# Patient Record
Sex: FEMALE | Race: WHITE | NOT HISPANIC OR LATINO | URBAN - METROPOLITAN AREA
[De-identification: names, ages, dates, MRNs, and addresses within clinical notes are randomized per-mention and may not be internally consistent; named-entity substitution may affect disease eponyms.]

---

## 2015-06-12 RX ORDER — ALPRAZOLAM 0.25 MG
1 TABLET ORAL
Qty: 0 | Refills: 0 | COMMUNITY
Start: 2015-06-12

## 2017-02-11 ENCOUNTER — INPATIENT (INPATIENT)
Facility: HOSPITAL | Age: 72
LOS: 3 days | Discharge: HOME HEALTH | DRG: 312 | End: 2017-02-15
Attending: FAMILY MEDICINE | Admitting: FAMILY MEDICINE
Payer: MEDICARE

## 2017-02-11 VITALS
TEMPERATURE: 99 F | HEART RATE: 111 BPM | OXYGEN SATURATION: 92 % | SYSTOLIC BLOOD PRESSURE: 129 MMHG | WEIGHT: 229.94 LBS | DIASTOLIC BLOOD PRESSURE: 93 MMHG | HEIGHT: 63 IN | RESPIRATION RATE: 18 BRPM

## 2017-02-11 DIAGNOSIS — Z95.1 PRESENCE OF AORTOCORONARY BYPASS GRAFT: Chronic | ICD-10-CM

## 2017-02-11 LAB
ALBUMIN SERPL ELPH-MCNC: 3.9 G/DL — SIGNIFICANT CHANGE UP (ref 3.3–5.2)
ALP SERPL-CCNC: 127 U/L — HIGH (ref 40–120)
ALT FLD-CCNC: 15 U/L — SIGNIFICANT CHANGE UP
ANION GAP SERPL CALC-SCNC: 14 MMOL/L — SIGNIFICANT CHANGE UP (ref 5–17)
APTT BLD: 31.8 SEC — SIGNIFICANT CHANGE UP (ref 27.5–37.4)
AST SERPL-CCNC: 23 U/L — SIGNIFICANT CHANGE UP
BASOPHILS # BLD AUTO: 0 K/UL — SIGNIFICANT CHANGE UP (ref 0–0.2)
BASOPHILS NFR BLD AUTO: 0.4 % — SIGNIFICANT CHANGE UP (ref 0–2)
BILIRUB SERPL-MCNC: 0.7 MG/DL — SIGNIFICANT CHANGE UP (ref 0.4–2)
BUN SERPL-MCNC: 15 MG/DL — SIGNIFICANT CHANGE UP (ref 8–20)
CALCIUM SERPL-MCNC: 9.7 MG/DL — SIGNIFICANT CHANGE UP (ref 8.6–10.2)
CHLORIDE SERPL-SCNC: 101 MMOL/L — SIGNIFICANT CHANGE UP (ref 98–107)
CK SERPL-CCNC: 112 U/L — SIGNIFICANT CHANGE UP (ref 25–170)
CO2 SERPL-SCNC: 24 MMOL/L — SIGNIFICANT CHANGE UP (ref 22–29)
CREAT SERPL-MCNC: 0.52 MG/DL — SIGNIFICANT CHANGE UP (ref 0.5–1.3)
EOSINOPHIL # BLD AUTO: 0 K/UL — SIGNIFICANT CHANGE UP (ref 0–0.5)
EOSINOPHIL NFR BLD AUTO: 0.4 % — SIGNIFICANT CHANGE UP (ref 0–6)
GLUCOSE SERPL-MCNC: 264 MG/DL — HIGH (ref 70–115)
HCT VFR BLD CALC: 35 % — LOW (ref 37–47)
HGB BLD-MCNC: 11 G/DL — LOW (ref 12–16)
INR BLD: 1.11 RATIO — SIGNIFICANT CHANGE UP (ref 0.88–1.16)
LYMPHOCYTES # BLD AUTO: 2.3 K/UL — SIGNIFICANT CHANGE UP (ref 1–4.8)
LYMPHOCYTES # BLD AUTO: 21.9 % — SIGNIFICANT CHANGE UP (ref 20–55)
MCHC RBC-ENTMCNC: 22.9 PG — LOW (ref 27–31)
MCHC RBC-ENTMCNC: 31.4 G/DL — LOW (ref 32–36)
MCV RBC AUTO: 72.9 FL — LOW (ref 81–99)
MONOCYTES # BLD AUTO: 0.7 K/UL — SIGNIFICANT CHANGE UP (ref 0–0.8)
MONOCYTES NFR BLD AUTO: 6.5 % — SIGNIFICANT CHANGE UP (ref 3–10)
NEUTROPHILS # BLD AUTO: 7.4 K/UL — SIGNIFICANT CHANGE UP (ref 1.8–8)
NEUTROPHILS NFR BLD AUTO: 70.5 % — SIGNIFICANT CHANGE UP (ref 37–73)
PLATELET # BLD AUTO: 204 K/UL — SIGNIFICANT CHANGE UP (ref 150–400)
POTASSIUM SERPL-MCNC: 3.7 MMOL/L — SIGNIFICANT CHANGE UP (ref 3.5–5.3)
POTASSIUM SERPL-SCNC: 3.7 MMOL/L — SIGNIFICANT CHANGE UP (ref 3.5–5.3)
PROT SERPL-MCNC: 6.5 G/DL — LOW (ref 6.6–8.7)
PROTHROM AB SERPL-ACNC: 12.2 SEC — SIGNIFICANT CHANGE UP (ref 10–13.1)
RBC # BLD: 4.8 M/UL — SIGNIFICANT CHANGE UP (ref 4.4–5.2)
RBC # FLD: 15.9 % — HIGH (ref 11–15.6)
SODIUM SERPL-SCNC: 139 MMOL/L — SIGNIFICANT CHANGE UP (ref 135–145)
TROPONIN T SERPL-MCNC: 0.16 NG/ML — CRITICAL HIGH (ref 0–0.06)
WBC # BLD: 10.48 K/UL — SIGNIFICANT CHANGE UP (ref 4.8–10.8)
WBC # FLD AUTO: 10.48 K/UL — SIGNIFICANT CHANGE UP (ref 4.8–10.8)

## 2017-02-11 PROCEDURE — 99285 EMERGENCY DEPT VISIT HI MDM: CPT

## 2017-02-11 PROCEDURE — 93010 ELECTROCARDIOGRAM REPORT: CPT

## 2017-02-11 RX ORDER — ASPIRIN/CALCIUM CARB/MAGNESIUM 324 MG
325 TABLET ORAL ONCE
Qty: 0 | Refills: 0 | Status: COMPLETED | OUTPATIENT
Start: 2017-02-11 | End: 2017-02-11

## 2017-02-11 RX ORDER — ALPRAZOLAM 0.25 MG
1 TABLET ORAL ONCE
Qty: 0 | Refills: 0 | Status: DISCONTINUED | OUTPATIENT
Start: 2017-02-11 | End: 2017-02-11

## 2017-02-11 RX ORDER — SODIUM CHLORIDE 9 MG/ML
3 INJECTION INTRAMUSCULAR; INTRAVENOUS; SUBCUTANEOUS ONCE
Qty: 0 | Refills: 0 | Status: COMPLETED | OUTPATIENT
Start: 2017-02-11 | End: 2017-02-11

## 2017-02-11 RX ADMIN — Medication 1 MILLIGRAM(S): at 22:16

## 2017-02-11 RX ADMIN — SODIUM CHLORIDE 3 MILLILITER(S): 9 INJECTION INTRAMUSCULAR; INTRAVENOUS; SUBCUTANEOUS at 20:45

## 2017-02-11 RX ADMIN — Medication 325 MILLIGRAM(S): at 22:16

## 2017-02-11 NOTE — ED PROVIDER NOTE - OBJECTIVE STATEMENT
70 y/o F with h/o stents, CABG, porcine valve replacement BIB EMS with daughter to the ED c/o syncopal episode with sudden onset ~5 hours ago. Pt notes dizziness before the episode. As per daughter, pt was trying to get into bed and lost consciousness suddenly falling onto her face witnessed by her son-in-law. Daughter notes the episode lasted 10-15 minutes and that her right arm was shaking during the episode. Daughter also notes pt was diaphoretic with widened pupils and slurred speech, and that she bit her tongue after she regained consciousness. Pt denies CP or SOB before the episode. Pt notes that she currently feels slightly SOB. Non-smoker, no EtOH use. No further complaints at this time. Cardiologist is Dr. Winters. Neurologist is Dr. Carvajal at [facility]. 70 y/o F with h/o stents, CABG, porcine valve replacement, Parkinson's, and DM BIB EMS with daughter to the ED c/o syncopal episode with sudden onset ~5 hours ago. Pt notes dizziness before the episode. As per daughter, pt was trying to get into bed and lost consciousness suddenly falling onto her face witnessed by her son-in-law. Daughter notes the episode lasted 10-15 minutes and that her right arm was shaking during the episode. Daughter also notes pt was diaphoretic with widened pupils and slurred speech, and that she bit her tongue after she regained consciousness. Pt denies CP or SOB before the episode. Pt notes that she currently feels slightly SOB. Non-smoker, no EtOH use. No further complaints at this time. Cardiologist is Dr. Winters. Neurologist is Dr. Carvajal at [facility]. 70 y/o F with h/o stents, CABG, porcine valve replacement, Parkinson's, and DM BIB EMS with daughter to the ED c/o syncopal episode with sudden onset ~5 hours ago. Pt notes dizziness before the episode. As per daughter, pt was trying to get into bed and lost consciousness suddenly falling onto her face witnessed by her son-in-law. Daughter notes the episode lasted 10-15 minutes and that her right arm was shaking during the episode. Daughter also notes pt was diaphoretic with widened pupils and slurred speech, and that she bit her tongue after she regained consciousness. Pt denies CP or SOB before the episode. Pt notes that she currently feels slightly SOB. Non-smoker, no EtOH use. No further complaints at this time. Cardiologist is Dr. Winters. Neurologist is Dr. Carvajal at [facility].

## 2017-02-11 NOTE — ED ADULT NURSE NOTE - OBJECTIVE STATEMENT
Patient alert and oriented x3 with noted to fall by family. Unaware of fall, as per family " about 15v minutes with labored breathing.

## 2017-02-11 NOTE — ED ADULT TRIAGE NOTE - CHIEF COMPLAINT QUOTE
BIBA, patient is awake and oriented times 3, s/p and episode of unresponsiveness today while at home today witnessed by family , fs [phone]

## 2017-02-11 NOTE — ED PROVIDER NOTE - MEDICAL DECISION MAKING DETAILS
Will check labs including cardiac enzymes. Pt will get CT head, and will require admission for further workup/evaluation for syncope/seizure. Will require evaluation by neurology.

## 2017-02-11 NOTE — ED PROVIDER NOTE - NEUROLOGICAL, MLM
Alert and oriented, no focal deficits, no motor or sensory deficits. CN II-XII intact. Muscle strength 5/5. Alert and oriented, no focal deficits, no motor or sensory deficits. CN II-XII intact. Muscle strength 5/5 b/l UE/LE's

## 2017-02-11 NOTE — ED PROVIDER NOTE - GASTROINTESTINAL, MLM
Abdomen soft, non-tender, no guarding. Bowel sounds present. Abdomen soft, non-tender, no guarding. Bowel sounds present, obese

## 2017-02-11 NOTE — ED PROVIDER NOTE - PROGRESS NOTE DETAILS
Pt with elev Troponin to 0.16 with no acute EKG changes.  Case d/w Hospitalist/Dr. Sahu for PMD/Dr. Smith and will admit to Tele

## 2017-02-11 NOTE — ED PROVIDER NOTE - ENMT, MLM
Airway patent, Nasal mucosa clear. Mouth with normal mucosa. Throat has no vesicles, no oropharyngeal exudates and uvula is midline. Neck supple, FROM. Light abrasion to left lateral tongue.

## 2017-02-11 NOTE — ED PROVIDER NOTE - NS ED MD SCRIBE ATTENDING SCRIBE SECTIONS
REVIEW OF SYSTEMS/PAST MEDICAL/SURGICAL/SOCIAL HISTORY/PHYSICAL EXAM/DISPOSITION/VITAL SIGNS( Pullset)/HISTORY OF PRESENT ILLNESS

## 2017-02-11 NOTE — ED PROVIDER NOTE - PMH
Anxiety    Aortic stenosis    Diabetes    Hypertension    Hypothyroid    Parkinson disease    Retinal defect, left

## 2017-02-11 NOTE — ED ADULT NURSE REASSESSMENT NOTE - NS ED NURSE REASSESS COMMENT FT1
Patient alert and oriented x3, EKG obtained and results given to MD. No noted concerns at this time. Will continue to monitor for safety and discomfort.

## 2017-02-11 NOTE — ED ADULT NURSE NOTE - PSH
S/P CABG x 2    S/P cholecystectomy    S/P hysterectomy    S/P orthopedic surgery, follow-up exam  back  S/P shoulder surgery  Bilateral Rotator cuff

## 2017-02-11 NOTE — ED PROVIDER NOTE - CARDIAC, MLM
Normal rate, regular rhythm.  Heart sounds S1, S2.  No murmurs, rubs or gallops. GR 2/6 style murmur. Normal rate, regular rhythm.  Heart sounds S1, S2. GR 2/6 style murmur. Normal rate, regular rhythm.  Heart sounds S1, S2. GR 2/6 JAVED

## 2017-02-12 DIAGNOSIS — G20 PARKINSON'S DISEASE: ICD-10-CM

## 2017-02-12 DIAGNOSIS — R79.89 OTHER SPECIFIED ABNORMAL FINDINGS OF BLOOD CHEMISTRY: ICD-10-CM

## 2017-02-12 DIAGNOSIS — R55 SYNCOPE AND COLLAPSE: ICD-10-CM

## 2017-02-12 DIAGNOSIS — E11.9 TYPE 2 DIABETES MELLITUS WITHOUT COMPLICATIONS: ICD-10-CM

## 2017-02-12 DIAGNOSIS — I24.9 ACUTE ISCHEMIC HEART DISEASE, UNSPECIFIED: ICD-10-CM

## 2017-02-12 DIAGNOSIS — I10 ESSENTIAL (PRIMARY) HYPERTENSION: ICD-10-CM

## 2017-02-12 LAB
TROPONIN T SERPL-MCNC: 0.05 NG/ML — SIGNIFICANT CHANGE UP (ref 0–0.06)
TROPONIN T SERPL-MCNC: 0.08 NG/ML — HIGH (ref 0–0.06)

## 2017-02-12 PROCEDURE — 99223 1ST HOSP IP/OBS HIGH 75: CPT

## 2017-02-12 PROCEDURE — 70450 CT HEAD/BRAIN W/O DYE: CPT | Mod: 26

## 2017-02-12 PROCEDURE — 93880 EXTRACRANIAL BILAT STUDY: CPT | Mod: 26

## 2017-02-12 PROCEDURE — 70551 MRI BRAIN STEM W/O DYE: CPT | Mod: 26

## 2017-02-12 RX ORDER — SERTRALINE 25 MG/1
100 TABLET, FILM COATED ORAL DAILY
Qty: 0 | Refills: 0 | Status: DISCONTINUED | OUTPATIENT
Start: 2017-02-12 | End: 2017-02-15

## 2017-02-12 RX ORDER — LOSARTAN POTASSIUM 100 MG/1
25 TABLET, FILM COATED ORAL DAILY
Qty: 0 | Refills: 0 | Status: DISCONTINUED | OUTPATIENT
Start: 2017-02-12 | End: 2017-02-15

## 2017-02-12 RX ORDER — CARBIDOPA AND LEVODOPA 25; 100 MG/1; MG/1
1 TABLET ORAL
Qty: 0 | Refills: 0 | Status: DISCONTINUED | OUTPATIENT
Start: 2017-02-12 | End: 2017-02-15

## 2017-02-12 RX ORDER — ENOXAPARIN SODIUM 100 MG/ML
40 INJECTION SUBCUTANEOUS DAILY
Qty: 0 | Refills: 0 | Status: DISCONTINUED | OUTPATIENT
Start: 2017-02-12 | End: 2017-02-15

## 2017-02-12 RX ORDER — CLOPIDOGREL BISULFATE 75 MG/1
75 TABLET, FILM COATED ORAL DAILY
Qty: 0 | Refills: 0 | Status: DISCONTINUED | OUTPATIENT
Start: 2017-02-12 | End: 2017-02-15

## 2017-02-12 RX ORDER — METOPROLOL TARTRATE 50 MG
50 TABLET ORAL DAILY
Qty: 0 | Refills: 0 | Status: DISCONTINUED | OUTPATIENT
Start: 2017-02-12 | End: 2017-02-15

## 2017-02-12 RX ORDER — GABAPENTIN 400 MG/1
200 CAPSULE ORAL DAILY
Qty: 0 | Refills: 0 | Status: DISCONTINUED | OUTPATIENT
Start: 2017-02-12 | End: 2017-02-15

## 2017-02-12 RX ADMIN — CARBIDOPA AND LEVODOPA 1 TABLET(S): 25; 100 TABLET ORAL at 23:36

## 2017-02-12 RX ADMIN — CARBIDOPA AND LEVODOPA 1 TABLET(S): 25; 100 TABLET ORAL at 12:12

## 2017-02-12 RX ADMIN — CARBIDOPA AND LEVODOPA 1 TABLET(S): 25; 100 TABLET ORAL at 17:04

## 2017-02-12 RX ADMIN — SERTRALINE 100 MILLIGRAM(S): 25 TABLET, FILM COATED ORAL at 12:12

## 2017-02-12 RX ADMIN — CLOPIDOGREL BISULFATE 75 MILLIGRAM(S): 75 TABLET, FILM COATED ORAL at 12:12

## 2017-02-12 RX ADMIN — LOSARTAN POTASSIUM 25 MILLIGRAM(S): 100 TABLET, FILM COATED ORAL at 05:51

## 2017-02-12 RX ADMIN — GABAPENTIN 200 MILLIGRAM(S): 400 CAPSULE ORAL at 12:12

## 2017-02-12 RX ADMIN — Medication 50 MILLIGRAM(S): at 05:52

## 2017-02-12 RX ADMIN — CARBIDOPA AND LEVODOPA 1 TABLET(S): 25; 100 TABLET ORAL at 05:51

## 2017-02-12 RX ADMIN — Medication 1 MILLIGRAM(S): at 15:07

## 2017-02-12 RX ADMIN — ENOXAPARIN SODIUM 40 MILLIGRAM(S): 100 INJECTION SUBCUTANEOUS at 12:12

## 2017-02-12 NOTE — CONSULT NOTE ADULT - SUBJECTIVE AND OBJECTIVE BOX
[facility], [facility]                                                    [address], [address], [place] [address]                                                         PHONE: [phone]    FAX: [phone] [address], [address], [place] [address]                                                 PHONE: [phone]    FAX: [phone]  *******************************************************************************    Reason for Consult: Elevated troponin    HPI:  ANGELA ACHARYA is a 71y Female with h/o CAD/stents, TAVR, DM, HTN, HL, obesity, COPD a/w syncope.  Patient was going to bed and developed visual changes, dizziness & had LOC x20 minutes and awoke in the ambulance.  No CP, palpitations, orthopnea, PND, LE edema.  + Chronic intermittent SOB stable.    PAST MEDICAL & SURGICAL HISTORY:  Retinal defect, left  Aortic stenosis  Parkinson disease  Anxiety  Diabetes  Hypertension  Hypothyroid  S/P shoulder surgery: Bilateral Rotator cuff  S/P orthopedic surgery, follow-up exam: back  S/P cholecystectomy  S/P hysterectomy  S/P CABG x 2      penicillin (Angioedema)  Ultram (Rash)      MEDICATIONS  (STANDING):  enoxaparin Injectable 40milliGRAM(s) SubCutaneous daily  clopidogrel Tablet 75milliGRAM(s) Oral daily  carbidopa/levodopa  25/100 1Tablet(s) Oral four times a day  sertraline 100milliGRAM(s) Oral daily  gabapentin 200milliGRAM(s) Oral daily  metoprolol succinate ER 50milliGRAM(s) Oral daily  losartan 25milliGRAM(s) Oral daily    MEDICATIONS  (PRN):  LORazepam   Injectable 1milliGRAM(s) IV Push once PRN for mri      Social History: no active tobacco / EtOH / IVDA    Family History: Family history of cirrhosis of liver  Family history of cirrhosis of liver      ROS: As noted above, otherwise unremarkable.    Vital Signs Last 24 Hrs  T(C): 37, Max: 37 (02-11 @ 16:38)  T(F): 98.6, Max: 98.6 (02-11 @ 16:38)  HR: 71 (71 - 111)  BP: 127/68 (125/77 - 145/63)  BP(mean): --  RR: 19 (18 - 20)  SpO2: 96% (92% - 97%)    I&O's Detail    I&O's Summary          PHYSICAL EXAM:  General: Appears well developed, well nourished, no acute distress  HEENT: Head: normocephalic, atraumatic  Eyes: Pupils equal and reactive  Neck: Supple, no carotid bruit, no JVD, no HJR  CARDIOVASCULAR: Normal S1 and S2, I/VI syst M > LSB  LUNGS: Clear to auscultation bilaterally, no rales, rhonchi or wheeze  ABDOMEN: Soft, nontender, non-distended, positive bowel sounds, no mass or bruit  EXTREMITIES: No edema, distal pulses WNL  SKIN: Warm and dry with normal turgor  NEURO: Alert & oriented x 3, grossly intact  PSYCH: normal mood and affect    LABS:                        11.0   10.48 )-----------( 204      ( 11 Feb 2017 20:51 )             35.0     11 Feb 2017 20:51    139    |  101    |  15.0   ----------------------------<  264    3.7     |  24.0   |  0.52     Ca    9.7        11 Feb 2017 20:51    TPro  6.5    /  Alb  3.9    /  TBili  0.7    /  DBili  x      /  AST  23     /  ALT  15     /  AlkPhos  127    11 Feb 2017 20:51    CARDIAC MARKERS ( 12 Feb 2017 10:41 )  x     / 0.08 ng/mL / x     / x     / x      CARDIAC MARKERS ( 11 Feb 2017 20:51 )  x     / 0.16 ng/mL / 112 U/L / x     / x          PT/INR - ( 11 Feb 2017 20:51 )   PT: 12.2 sec;   INR: 1.11 ratio         PTT - ( 11 Feb 2017 20:51 )  PTT:31.8 sec    RADIOLOGY & ADDITIONAL STUDIES:    ECG: NSR @ 91 bpm, LVH, PRWP, lateral repolarization abnormality c/w LVH strain vs ischemia      Assessment and Plan:  In summary, ANGELA ACHARYA is a 71F a/w syncope, mild troponin increase (peak 0.16), h/o CAD/stents, TAVR, DM, HTN, HL, obesity, COPD  - Monitor on telemetry  - Trend troponins x3  - Repeat EKG  - Echocardiogram  - Carotid duplex scan  - Mild troponin increase is non-specific.  Patient is currently chest pain free with no acute EKG changes.  Continue medical treatment for now pending the results of the above.  - Rhythm/hemodynamics stable = continue current doses of Toprol XL 50 daily & Losartan 25 daily for now and titrate PRN  - Plavix 75 daily in place  - Will check office records    We will follow with you.  Thank you for allowing me to participate in the care of your patient.      Sincerely,    Jorge Weiss MD [facility], [facility]                                                    [address], [address], [place] [address]                                                         PHONE: [phone]    FAX: [phone] [address], [address], [place] [address]                                                 PHONE: [phone]    FAX: [phone]  *******************************************************************************    Reason for Consult: Elevated troponin    HPI:  ANGELA ACHARYA is a 71y Female with h/o CAD/stents, TAVR, DM, HTN, HL, obesity, COPD a/w syncope.  Patient was going to bed and developed visual changes, dizziness & had LOC x20 minutes and awoke in the ambulance.  No CP, palpitations, orthopnea, PND, LE edema.  + Chronic intermittent SOB stable.    PAST MEDICAL & SURGICAL HISTORY:  Retinal defect, left  Aortic stenosis  Parkinson disease  Anxiety  Diabetes  Hypertension  Hypothyroid  S/P shoulder surgery: Bilateral Rotator cuff  S/P orthopedic surgery, follow-up exam: back  S/P cholecystectomy  S/P hysterectomy  S/P CABG x 2      penicillin (Angioedema)  Ultram (Rash)      MEDICATIONS  (STANDING):  enoxaparin Injectable 40milliGRAM(s) SubCutaneous daily  clopidogrel Tablet 75milliGRAM(s) Oral daily  carbidopa/levodopa  25/100 1Tablet(s) Oral four times a day  sertraline 100milliGRAM(s) Oral daily  gabapentin 200milliGRAM(s) Oral daily  metoprolol succinate ER 50milliGRAM(s) Oral daily  losartan 25milliGRAM(s) Oral daily    MEDICATIONS  (PRN):  LORazepam   Injectable 1milliGRAM(s) IV Push once PRN for mri      Social History: no active tobacco / EtOH / IVDA    Family History: Family history of cirrhosis of liver  Family history of cirrhosis of liver      ROS: As noted above, otherwise unremarkable.    Vital Signs Last 24 Hrs  T(C): 37, Max: 37 (02-11 @ 16:38)  T(F): 98.6, Max: 98.6 (02-11 @ 16:38)  HR: 71 (71 - 111)  BP: 127/68 (125/77 - 145/63)  BP(mean): --  RR: 19 (18 - 20)  SpO2: 96% (92% - 97%)    I&O's Detail    I&O's Summary          PHYSICAL EXAM:  General: Appears well developed, well nourished, no acute distress  HEENT: Head: normocephalic, atraumatic  Eyes: Pupils equal and reactive  Neck: Supple, no carotid bruit, no JVD, no HJR  CARDIOVASCULAR: Normal S1 and S2, I/VI syst M > LSB  LUNGS: Clear to auscultation bilaterally, no rales, rhonchi or wheeze  ABDOMEN: Soft, nontender, non-distended, positive bowel sounds, no mass or bruit  EXTREMITIES: No edema, distal pulses WNL  SKIN: Warm and dry with normal turgor  NEURO: Alert & oriented x 3, grossly intact  PSYCH: normal mood and affect    LABS:                        11.0   10.48 )-----------( 204      ( 11 Feb 2017 20:51 )             35.0     11 Feb 2017 20:51    139    |  101    |  15.0   ----------------------------<  264    3.7     |  24.0   |  0.52     Ca    9.7        11 Feb 2017 20:51    TPro  6.5    /  Alb  3.9    /  TBili  0.7    /  DBili  x      /  AST  23     /  ALT  15     /  AlkPhos  127    11 Feb 2017 20:51    CARDIAC MARKERS ( 12 Feb 2017 10:41 )  x     / 0.08 ng/mL / x     / x     / x      CARDIAC MARKERS ( 11 Feb 2017 20:51 )  x     / 0.16 ng/mL / 112 U/L / x     / x          PT/INR - ( 11 Feb 2017 20:51 )   PT: 12.2 sec;   INR: 1.11 ratio         PTT - ( 11 Feb 2017 20:51 )  PTT:31.8 sec    RADIOLOGY & ADDITIONAL STUDIES:    ECG: NSR @ 91 bpm, LVH, PRWP, lateral repolarization abnormality c/w LVH strain vs ischemia      Assessment and Plan:  In summary, ANGELA ACHARYA is a 71F a/w syncope, mild troponin increase (peak 0.16), h/o CAD/stents, TAVR, DM, HTN, HL, obesity, COPD  - Monitor on telemetry  - Trend troponins x3  - Repeat EKG  - Echocardiogram  - Carotid duplex scan  - Further work-up & testing (MRI brain, MRA, EEG, etc.) per medicine/neurology  - Mild troponin increase is non-specific.  Patient is currently chest pain free with no acute EKG changes.  Continue medical treatment for now pending the results of the above.  - Rhythm/hemodynamics stable = continue current doses of Toprol XL 50 daily & Losartan 25 daily for now and titrate PRN  - Plavix 75 daily in place  - Will check office records    We will follow with you.  Thank you for allowing me to participate in the care of your patient.      Sincerely,    Jorge Weiss MD

## 2017-02-12 NOTE — CONSULT NOTE ADULT - PROBLEM SELECTOR RECOMMENDATION 9
1. Brain MRI w/o contras to rule out acute cerebral ischemic lesion.  2. EEG to rule out seizure  3. Carotid duplex  4. Continue Plavix and risk factor management  5. Continue Sinemet (consider to reduce Sinemet dosage)  6. Check orthostatic BPs  7. AED if seizure activity  8. Neurologically stable

## 2017-02-12 NOTE — H&P ADULT. - FAMILY HISTORY
<<-----Click on this checkbox to enter Family History Family history of cirrhosis of liver     Father  Still living? No  Family history of cirrhosis of liver, Age at diagnosis: Age Unknown

## 2017-02-12 NOTE — H&P ADULT. - RS GEN PE MLT RESP DETAILS PC
respirations non-labored/good air movement/clear to auscultation bilaterally/breath sounds equal/airway patent

## 2017-02-12 NOTE — CHART NOTE - NSCHARTNOTEFT_GEN_A_CORE
patient being seen for syncope, nstemi, parkinsons and medical management. patient seen at bedside and denies any complaints.     vitals reviewed    repeat troponin ordered    a/p  1) Syncope --> continue with tele  will order MRI to rule out TIA  --> tte, carotids  --> cardio consult  --> lipid panel and hemoglobin a1c ordered for am    2) NSTEMI --> repeat trop  --> cardio consulted by nocturnist    3) parkinsons --> home meds    case discussed with patient and son  pmd to take over care in am

## 2017-02-12 NOTE — CONSULT NOTE ADULT - SUBJECTIVE AND OBJECTIVE BOX
Patient is a 71y old  Female who presents with a chief complaint of Passed out (12 Feb 2017 01:31)      HPI:  71 year-old female patient presented with an episode of syncope. The patient reported feeling dizzy, foggy, strange feeling, and blurred vison prior to syncope. The patient passed out and lost her consciousness. There was witnessed arm shaking. The patient has no any recollection about the event. The patient had tongue biting without incontinence. The patient reported having episodes of dizziness in the past few weeks,  she also had tongue biting few times. The patient denies a history of seizure. The patient also reported intermittent headache without nausea or vomiting. The patient denied chest pain, palpitations, shortness of breath.  The patient has followed with Dr Carvajal for management of Parkinson's disease. The patient takes Sinemet and Neupro patch.       PAST MEDICAL & SURGICAL HISTORY:  Retinal defect, left  Aortic stenosis  Parkinson disease  Anxiety  Diabetes  Hypertension  Hypothyroid  S/P shoulder surgery: Bilateral Rotator cuff  S/P orthopedic surgery, follow-up exam: back  S/P cholecystectomy  S/P hysterectomy  S/P CABG x 2      REVIEW OF SYSTEMS:  System review as documented in the record.       MEDICATIONS  (STANDING):  enoxaparin Injectable 40milliGRAM(s) SubCutaneous daily  clopidogrel Tablet 75milliGRAM(s) Oral daily  carbidopa/levodopa  25/100 1Tablet(s) Oral four times a day  sertraline 100milliGRAM(s) Oral daily  gabapentin 200milliGRAM(s) Oral daily  metoprolol succinate ER 50milliGRAM(s) Oral daily  losartan 25milliGRAM(s) Oral daily    MEDICATIONS  (PRN):  LORazepam   Injectable 1milliGRAM(s) IV Push once PRN for mri      Allergies    penicillin (Angioedema)  Ultram (Rash)    Intolerances        SOCIAL HISTORY:    FAMILY HISTORY:  Family history of cirrhosis of liver: Father  Family history of cirrhosis of liver      PHYSICAL EXAM:  Vital Signs Last 24 Hrs  T(F): 98.6  HR: 71  BP: 127/68  RR: 19  Wt(kg): --    GENERAL: No acute distress  EYES: Pupils are reactive, conjunctiva and sclera clear  NECK: Supple, No nuchal rigidity, No JVD, no carotid bruit bilateral  HEART: Regular rate and rhythm; No murmurs, rubs, or gallops    NERVOUS SYSTEM:    Alert & Oriented, speech and language normal.  Cranial nerves exam: II-XII normal.   Motor exam: no pronator drift,  Strength 5/5 B/L upper and lower extremities, mild tremor, no rigidity, no dyskinesia  Sensory exam: normal to light touch, pin prick and position sense.  DTR: 1-2+ symmetric, plantar responses flexor bilaterally.  No dysmetria bilaterally      LABS:                        11.0   10.48 )-----------( 204      ( 11 Feb 2017 20:51 )             35.0     11 Feb 2017 20:51    139    |  101    |  15.0   ----------------------------<  264    3.7     |  24.0   |  0.52     Ca    9.7        11 Feb 2017 20:51    TPro  6.5    /  Alb  3.9    /  TBili  0.7    /  DBili  x      /  AST  23     /  ALT  15     /  AlkPhos  127    11 Feb 2017 20:51    PT/INR - ( 11 Feb 2017 20:51 )   PT: 12.2 sec;   INR: 1.11 ratio         PTT - ( 11 Feb 2017 20:51 )  PTT:31.8 sec        RADIOLOGY & ADDITIONAL STUDIES:    Head CT Feb 12, 2017 reported Parenchymal volume loss is noted with prominent ventricles and sulci. Chronic microvascular ischemic changes are identified. Gliosis and encephalomalacia is seen in the right occipital lobe likely a sequela of prior infarction. No acute territorial infarct is demonstrated.  There is no evidence of an acute hemorrhage, mass or mass-effect in the posterior fossa or in the supratentorial region.     Evaluation of the osseous structures with the appropriate window appears unremarkable. Vascular calcifications are noted. Sequela of bilateral   lens surgery is seen. The visualized paranasal sinuses and mastoid air cells are clear.    IMPRESSION: No acute territorial infarct, hemorrhage, mass or mass effect.      IMPRESSION:  1. Syncope with seizure like activities, r/o seizure  2. Syncope, r/o orthostatic hypotension  3. History of Parkinson's disease  4. Cerebral microvascular disease        PLAN:  1. Brain MRI w/o contras to rule out acute cerebral ischemic lesion.  2. EEG to rule out seizure  3. Carotid duplex  4. Continue Plavix and risk factor management  5. Continue Sinemet (consider to reduce Sinemet dosage)  6. Check orthostatic BPs  7. AED if seizure activity  8. Neurologically stable

## 2017-02-12 NOTE — H&P ADULT. - HISTORY OF PRESENT ILLNESS
71F presented after an episode of syncope at home. The patient was in the process of getting into bed when she suddenly loss consciousness. She denied any recollection of the event but did note that prior to passing out, she had felt dizzy. The patient reported that she has been having episodes of dizziness for the past few weeks. The patient was reportedly unconscious for about ten minutes. During that time, she was noted to have had some shaking in her right arm. There was no bowel or bladder incontinence. When the patient awoke, she had bit her tongue. According to the patient, she had been biting her tongue recently prior to this event. She denied any prior episodes of syncope in the past. The patient had recently seen her Neurologist without any change in her medications. 71F presented after an episode of syncope at home. The patient was in the process of getting into bed when she suddenly loss consciousness. She denied any recollection of the event but did note that prior to passing out, she had felt dizzy. The patient reported that she has been having episodes of dizziness for the past few weeks. The patient was reportedly unconscious for about ten minutes. During that time, she was noted to have had some shaking in her right arm. There was no bowel or bladder incontinence. When the patient awoke, she had bit her tongue. According to the patient, she had been biting her tongue recently prior to this event. She denied any prior episodes of syncope in the past. The patient had recently seen her Neurologist without any change in her medications. The patient denied any chest pain or palpitations.

## 2017-02-13 DIAGNOSIS — E11.9 TYPE 2 DIABETES MELLITUS WITHOUT COMPLICATIONS: ICD-10-CM

## 2017-02-13 DIAGNOSIS — I10 ESSENTIAL (PRIMARY) HYPERTENSION: ICD-10-CM

## 2017-02-13 LAB
ANION GAP SERPL CALC-SCNC: 14 MMOL/L — SIGNIFICANT CHANGE UP (ref 5–17)
BUN SERPL-MCNC: 7 MG/DL — LOW (ref 8–20)
CALCIUM SERPL-MCNC: 9.5 MG/DL — SIGNIFICANT CHANGE UP (ref 8.6–10.2)
CHLORIDE SERPL-SCNC: 101 MMOL/L — SIGNIFICANT CHANGE UP (ref 98–107)
CHOLEST SERPL-MCNC: 139 MG/DL — SIGNIFICANT CHANGE UP (ref 110–199)
CO2 SERPL-SCNC: 24 MMOL/L — SIGNIFICANT CHANGE UP (ref 22–29)
CREAT SERPL-MCNC: 0.28 MG/DL — LOW (ref 0.5–1.3)
GLUCOSE SERPL-MCNC: 252 MG/DL — HIGH (ref 70–115)
HBA1C BLD-MCNC: 10.3 % — HIGH (ref 4–5.6)
HCT VFR BLD CALC: 34.5 % — LOW (ref 37–47)
HDLC SERPL-MCNC: 38 MG/DL — LOW
HGB BLD-MCNC: 11.2 G/DL — LOW (ref 12–16)
LIPID PNL WITH DIRECT LDL SERPL: 75 MG/DL — SIGNIFICANT CHANGE UP
MAGNESIUM SERPL-MCNC: 1.6 MG/DL — LOW (ref 1.8–2.5)
MCHC RBC-ENTMCNC: 23.4 PG — LOW (ref 27–31)
MCHC RBC-ENTMCNC: 32.5 G/DL — SIGNIFICANT CHANGE UP (ref 32–36)
MCV RBC AUTO: 72.2 FL — LOW (ref 81–99)
PLATELET # BLD AUTO: 175 K/UL — SIGNIFICANT CHANGE UP (ref 150–400)
POTASSIUM SERPL-MCNC: 3.9 MMOL/L — SIGNIFICANT CHANGE UP (ref 3.5–5.3)
POTASSIUM SERPL-SCNC: 3.9 MMOL/L — SIGNIFICANT CHANGE UP (ref 3.5–5.3)
RBC # BLD: 4.78 M/UL — SIGNIFICANT CHANGE UP (ref 4.4–5.2)
RBC # FLD: 16.8 % — HIGH (ref 11–15.6)
SODIUM SERPL-SCNC: 139 MMOL/L — SIGNIFICANT CHANGE UP (ref 135–145)
TOTAL CHOLESTEROL/HDL RATIO MEASUREMENT: 4 RATIO — SIGNIFICANT CHANGE UP (ref 3.3–7.1)
TRIGL SERPL-MCNC: 132 MG/DL — SIGNIFICANT CHANGE UP (ref 10–200)
WBC # BLD: 9.16 K/UL — SIGNIFICANT CHANGE UP (ref 4.8–10.8)
WBC # FLD AUTO: 9.16 K/UL — SIGNIFICANT CHANGE UP (ref 4.8–10.8)

## 2017-02-13 RX ORDER — DEXTROSE 50 % IN WATER 50 %
1 SYRINGE (ML) INTRAVENOUS ONCE
Qty: 0 | Refills: 0 | Status: DISCONTINUED | OUTPATIENT
Start: 2017-02-13 | End: 2017-02-15

## 2017-02-13 RX ORDER — GLUCAGON INJECTION, SOLUTION 0.5 MG/.1ML
1 INJECTION, SOLUTION SUBCUTANEOUS ONCE
Qty: 0 | Refills: 0 | Status: DISCONTINUED | OUTPATIENT
Start: 2017-02-13 | End: 2017-02-15

## 2017-02-13 RX ORDER — DEXTROSE 50 % IN WATER 50 %
25 SYRINGE (ML) INTRAVENOUS ONCE
Qty: 0 | Refills: 0 | Status: DISCONTINUED | OUTPATIENT
Start: 2017-02-13 | End: 2017-02-15

## 2017-02-13 RX ORDER — DEXTROSE 50 % IN WATER 50 %
12.5 SYRINGE (ML) INTRAVENOUS ONCE
Qty: 0 | Refills: 0 | Status: DISCONTINUED | OUTPATIENT
Start: 2017-02-13 | End: 2017-02-15

## 2017-02-13 RX ORDER — ROTIGOTINE 8 MG/24H
1 PATCH, EXTENDED RELEASE TRANSDERMAL EVERY 24 HOURS
Qty: 0 | Refills: 0 | Status: DISCONTINUED | OUTPATIENT
Start: 2017-02-13 | End: 2017-02-15

## 2017-02-13 RX ORDER — INSULIN LISPRO 100/ML
VIAL (ML) SUBCUTANEOUS
Qty: 0 | Refills: 0 | Status: DISCONTINUED | OUTPATIENT
Start: 2017-02-13 | End: 2017-02-15

## 2017-02-13 RX ORDER — SODIUM CHLORIDE 0.65 %
1 AEROSOL, SPRAY (ML) NASAL
Qty: 0 | Refills: 0 | Status: DISCONTINUED | OUTPATIENT
Start: 2017-02-13 | End: 2017-02-15

## 2017-02-13 RX ORDER — INSULIN LISPRO 100/ML
VIAL (ML) SUBCUTANEOUS AT BEDTIME
Qty: 0 | Refills: 0 | Status: DISCONTINUED | OUTPATIENT
Start: 2017-02-13 | End: 2017-02-15

## 2017-02-13 RX ORDER — SODIUM CHLORIDE 9 MG/ML
1000 INJECTION, SOLUTION INTRAVENOUS
Qty: 0 | Refills: 0 | Status: DISCONTINUED | OUTPATIENT
Start: 2017-02-13 | End: 2017-02-15

## 2017-02-13 RX ADMIN — ENOXAPARIN SODIUM 40 MILLIGRAM(S): 100 INJECTION SUBCUTANEOUS at 12:00

## 2017-02-13 RX ADMIN — CARBIDOPA AND LEVODOPA 1 TABLET(S): 25; 100 TABLET ORAL at 05:51

## 2017-02-13 RX ADMIN — LOSARTAN POTASSIUM 25 MILLIGRAM(S): 100 TABLET, FILM COATED ORAL at 05:51

## 2017-02-13 RX ADMIN — Medication 50 MILLIGRAM(S): at 05:51

## 2017-02-13 RX ADMIN — CARBIDOPA AND LEVODOPA 1 TABLET(S): 25; 100 TABLET ORAL at 18:24

## 2017-02-13 RX ADMIN — GABAPENTIN 200 MILLIGRAM(S): 400 CAPSULE ORAL at 12:00

## 2017-02-13 RX ADMIN — ROTIGOTINE 1 PATCH: 8 PATCH, EXTENDED RELEASE TRANSDERMAL at 17:15

## 2017-02-13 RX ADMIN — CARBIDOPA AND LEVODOPA 1 TABLET(S): 25; 100 TABLET ORAL at 12:00

## 2017-02-13 RX ADMIN — SERTRALINE 100 MILLIGRAM(S): 25 TABLET, FILM COATED ORAL at 12:00

## 2017-02-13 RX ADMIN — CLOPIDOGREL BISULFATE 75 MILLIGRAM(S): 75 TABLET, FILM COATED ORAL at 12:00

## 2017-02-13 NOTE — PROGRESS NOTE ADULT - PROBLEM SELECTOR PLAN 1
Await for EEG r/o seizures.  Continue with current Parkinsons meds.  Get Orthostats.  DVT prophylaxis.  PT/OT.  Will follow.

## 2017-02-13 NOTE — PROGRESS NOTE ADULT - SUBJECTIVE AND OBJECTIVE BOX
SUBJECTIVE    REVIEW OF SYSTEMS    General: Not in any pain	    Skin/Breast: No rash  	  ENMT: No visual problems, no sore throat	    Respiratory and Thorax: No cough, No CP, No SOB  	  Cardiovascular: No CP, No palpitations    Gastrointestinal: No Abd pain, No N/V/D    Musculoskeletal: No Joint pain, No back pain	    Neurological: No headache    Psychiatric: No anxiety      OBJECTIVE    Vital Signs Last 24 Hrs  T(C): 36.9, Max: 37.2 (02-12 @ 19:10)  T(F): 98.4, Max: 99 (02-12 @ 19:10)  HR: 93 (71 - 97)  BP: 138/60 (127/61 - 142/60)  BP(mean): --  RR: 18 (18 - 27)  SpO2: 93% (93% - 96%)    PHYSICAL EXAM:    Constitutional: Not in any distress    Eyes: No conjunctival injection    ENMT: No oral lesions    Neck: No nodes, no adenopathy    Back: Straight, no defects    Respiratory: clear b/l    Cardiovascular: RRR, no murmur    Gastrointestinal: soft, NT, ND    Extremities: No edema, no erythema    Neurological: no focal deficit    Skin: No rash      MEDICATIONS  (STANDING):  enoxaparin Injectable 40milliGRAM(s) SubCutaneous daily  clopidogrel Tablet 75milliGRAM(s) Oral daily  carbidopa/levodopa  25/100 1Tablet(s) Oral four times a day  sertraline 100milliGRAM(s) Oral daily  gabapentin 200milliGRAM(s) Oral daily  metoprolol succinate ER 50milliGRAM(s) Oral daily  losartan 25milliGRAM(s) Oral daily    MEDICATIONS  (PRN):    CARDIAC MARKERS ( 12 Feb 2017 16:31 )  x     / 0.05 ng/mL / x     / x     / x      CARDIAC MARKERS ( 12 Feb 2017 10:41 )  x     / 0.08 ng/mL / x     / x     / x      CARDIAC MARKERS ( 11 Feb 2017 20:51 )  x     / 0.16 ng/mL / 112 U/L / x     / x                                11.2   9.16  )-----------( 175      ( 13 Feb 2017 07:05 )             34.5     13 Feb 2017 07:05    139    |  101    |  7.0    ----------------------------<  252    3.9     |  24.0   |  0.28     Ca    9.5        13 Feb 2017 07:05  Mg     1.6       13 Feb 2017 07:05    TPro  6.5    /  Alb  3.9    /  TBili  0.7    /  DBili  x      /  AST  23     /  ALT  15     /  AlkPhos  127    11 Feb 2017 20:51    Allergies    penicillin (Angioedema)  Ultram (Rash)    Intolerances

## 2017-02-13 NOTE — PROGRESS NOTE ADULT - SUBJECTIVE AND OBJECTIVE BOX
INTERVAL HISTORY:  Feels well, denies CP, SOB  	  MEDICATIONS:  metoprolol succinate ER 50milliGRAM(s) Oral daily  losartan 25milliGRAM(s) Oral daily  carbidopa/levodopa  25/100 1Tablet(s) Oral four times a day  sertraline 100milliGRAM(s) Oral daily  gabapentin 200milliGRAM(s) Oral daily  enoxaparin Injectable 40milliGRAM(s) SubCutaneous daily  clopidogrel Tablet 75milliGRAM(s) Oral daily        PHYSICAL EXAM:  T(C): 36.9, Max: 37.2 (02-12 @ 19:10)  HR: 93 (71 - 97)  BP: 138/60 (127/61 - 142/60)  RR: 18 (18 - 27)  SpO2: 93% (93% - 96%)  Wt(kg): --  I&O's Summary    I & Os for current day (as of 13 Feb 2017 08:31)  =============================================  IN: 120 ml / OUT: 100 ml / NET: 20 ml        Appearance: Normal		  Cardiovascular: Normal S1 S2, No JVD, No murmurs, No edema  Respiratory: Lungs clear to auscultation	  Psychiatry: A & O x 3, Mood & affect appropriate  Gastrointestinal:  Soft, Non-tender, + BS	  Skin: No rashes, No ecchymoses, No cyanosis  Neurologic: Non-focal  Extremities: Normal range of motion, No clubbing, cyanosis or edema  Vascular: Peripheral pulses palpable 2+ bilaterally    TELEMETRY: SR, PVCs	                         11.0   10.48 )-----------( 204      ( 11 Feb 2017 20:51 )             35.0     13 Feb 2017 07:05    139    |  101    |  7.0    ----------------------------<  252    3.9     |  24.0   |  0.28     Ca    9.5        13 Feb 2017 07:05  Mg     1.6       13 Feb 2017 07:05    TPro  6.5    /  Alb  3.9    /  TBili  0.7    /  DBili  x      /  AST  23     /  ALT  15     /  AlkPhos  127    11 Feb 2017 20:51    proBNP:   Lipid Profile:   HgA1c: Hemoglobin A1C, Whole Blood: 10.3 % (02-13 @ 07:05)    ASSESSMENT/PLAN: 	ANGELA ACHARYA is a 71F a/w syncope, mild troponin increase (peak 0.16), now decreaseing, h/o CAD/stents, TAVR, DM, HTN, HL, obesity, COPD  - No significant arrhythmia on telemetry  - Echocardiogram, Carotid duplex scan pending  - Further work-up & testing (MRI brain, MRA, EEG, etc.) per medicine/neurology  - Mild troponin increase is non-specific.  Patient is currently chest pain free with no acute EKG changes.  Continue medical treatment for now pending the results of the above.  - Rhythm/hemodynamics stable = continue current doses of Toprol XL 50 daily & Losartan 25 daily for now and titrate PRN  - Plavix 75 daily in place

## 2017-02-13 NOTE — PROGRESS NOTE ADULT - SUBJECTIVE AND OBJECTIVE BOX
INTERVAL HISTORY:  Seen at bedside; fatigue; no recurrent LOC.  Comfortable.    penicillin (Angioedema)  Ultram (Rash)      VITAL SIGNS:  Vital Signs Last 24 Hrs  T(C): 36.9, Max: 37.2 (02-12 @ 19:10)  T(F): 98.4, Max: 99 (02-12 @ 19:10)  HR: 93 (71 - 97)  BP: 138/60 (127/61 - 142/60)  BP(mean): --  RR: 18 (18 - 27)  SpO2: 93% (93% - 96%)    PHYSICAL EXAMINATION:  General: Well-developed, well nourished, in no acute distress.  Eyes: Conjunctiva and sclera clear.  Neurologic:  - Mental Status:  Alert, awake, oriented to person, place, and time; Speech is normal; Affect is normal.  - Cranial Nerves: II-XI intact.  - Motor:  Strength is 5/5 throughout.  There is no pronator drift.  Normal muscle bulk and tone throughout.  - Reflexes:  2+ throughout; Plantar downwards  - Sensory:  Intact to light touch, pin prick, vibration, and joint-position sense throughout.  - Coordination:  No dysmetria/dysdiadochokinesis.  - Gait:  Deferred    MEDS:  MEDICATIONS  (STANDING):  enoxaparin Injectable 40milliGRAM(s) SubCutaneous daily  clopidogrel Tablet 75milliGRAM(s) Oral daily  carbidopa/levodopa  25/100 1Tablet(s) Oral four times a day  sertraline 100milliGRAM(s) Oral daily  gabapentin 200milliGRAM(s) Oral daily  metoprolol succinate ER 50milliGRAM(s) Oral daily  losartan 25milliGRAM(s) Oral daily    MEDICATIONS  (PRN):        LABS:                          11.2   9.16  )-----------( 175      ( 13 Feb 2017 07:05 )             34.5     13 Feb 2017 07:05    139    |  101    |  7.0    ----------------------------<  252    3.9     |  24.0   |  0.28     Ca    9.5        13 Feb 2017 07:05  Mg     1.6       13 Feb 2017 07:05    TPro  6.5    /  Alb  3.9    /  TBili  0.7    /  DBili  x      /  AST  23     /  ALT  15     /  AlkPhos  127    11 Feb 2017 20:51    LIVER FUNCTIONS - ( 11 Feb 2017 20:51 )  Alb: 3.9 g/dL / Pro: 6.5 g/dL / ALK PHOS: 127 U/L / ALT: 15 U/L / AST: 23 U/L / GGT: x               RADIOLOGY & ADDITIONAL STUDIES:      MRI Brain 2-12-17 - no cva/mass/bleed.  CDS 2-12-17 - Moderate plaque without any severe hemodynamic stenosis.    IMPRESSION:  Parkinsons dx; syncope.

## 2017-02-14 PROCEDURE — 95819 EEG AWAKE AND ASLEEP: CPT | Mod: 26

## 2017-02-14 RX ORDER — IBUPROFEN 200 MG
400 TABLET ORAL
Qty: 0 | Refills: 0 | Status: DISCONTINUED | OUTPATIENT
Start: 2017-02-14 | End: 2017-02-15

## 2017-02-14 RX ORDER — NYSTATIN CREAM 100000 [USP'U]/G
1 CREAM TOPICAL
Qty: 0 | Refills: 0 | Status: DISCONTINUED | OUTPATIENT
Start: 2017-02-14 | End: 2017-02-15

## 2017-02-14 RX ADMIN — Medication 400 MILLIGRAM(S): at 17:30

## 2017-02-14 RX ADMIN — Medication 1: at 08:15

## 2017-02-14 RX ADMIN — Medication 1 SPRAY(S): at 09:41

## 2017-02-14 RX ADMIN — ENOXAPARIN SODIUM 40 MILLIGRAM(S): 100 INJECTION SUBCUTANEOUS at 11:25

## 2017-02-14 RX ADMIN — GABAPENTIN 200 MILLIGRAM(S): 400 CAPSULE ORAL at 11:25

## 2017-02-14 RX ADMIN — ROTIGOTINE 1 PATCH: 8 PATCH, EXTENDED RELEASE TRANSDERMAL at 17:09

## 2017-02-14 RX ADMIN — Medication 1 SPRAY(S): at 17:30

## 2017-02-14 RX ADMIN — CARBIDOPA AND LEVODOPA 1 TABLET(S): 25; 100 TABLET ORAL at 11:25

## 2017-02-14 RX ADMIN — CARBIDOPA AND LEVODOPA 1 TABLET(S): 25; 100 TABLET ORAL at 23:00

## 2017-02-14 RX ADMIN — CARBIDOPA AND LEVODOPA 1 TABLET(S): 25; 100 TABLET ORAL at 00:23

## 2017-02-14 RX ADMIN — Medication 50 MILLIGRAM(S): at 05:27

## 2017-02-14 RX ADMIN — LOSARTAN POTASSIUM 25 MILLIGRAM(S): 100 TABLET, FILM COATED ORAL at 05:28

## 2017-02-14 RX ADMIN — CLOPIDOGREL BISULFATE 75 MILLIGRAM(S): 75 TABLET, FILM COATED ORAL at 11:25

## 2017-02-14 RX ADMIN — Medication 400 MILLIGRAM(S): at 18:01

## 2017-02-14 RX ADMIN — Medication 1: at 11:25

## 2017-02-14 RX ADMIN — CARBIDOPA AND LEVODOPA 1 TABLET(S): 25; 100 TABLET ORAL at 05:28

## 2017-02-14 RX ADMIN — CARBIDOPA AND LEVODOPA 1 TABLET(S): 25; 100 TABLET ORAL at 17:09

## 2017-02-14 RX ADMIN — SERTRALINE 100 MILLIGRAM(S): 25 TABLET, FILM COATED ORAL at 11:25

## 2017-02-14 RX ADMIN — NYSTATIN CREAM 1 APPLICATION(S): 100000 CREAM TOPICAL at 17:10

## 2017-02-14 NOTE — PROGRESS NOTE ADULT - SUBJECTIVE AND OBJECTIVE BOX
INTERVAL HISTORY:  denies CP,SOB  	  MEDICATIONS:  metoprolol succinate ER 50milliGRAM(s) Oral daily  losartan 25milliGRAM(s) Oral daily  carbidopa/levodopa  25/100 1Tablet(s) Oral four times a day  sertraline 100milliGRAM(s) Oral daily  gabapentin 200milliGRAM(s) Oral daily  rotigotine patch 6 mG/24 Hr(s) 1Patch Transdermal every 24 hours  insulin lispro (HumaLOG) corrective regimen sliding scale  SubCutaneous three times a day before meals  insulin lispro (HumaLOG) corrective regimen sliding scale  SubCutaneous at bedtime  dextrose Gel 1Dose(s) Oral once PRN  dextrose 50% Injectable 12.5Gram(s) IV Push once  dextrose 50% Injectable 25Gram(s) IV Push once  dextrose 50% Injectable 25Gram(s) IV Push once  glucagon  Injectable 1milliGRAM(s) IntraMuscular once PRN  enoxaparin Injectable 40milliGRAM(s) SubCutaneous daily  clopidogrel Tablet 75milliGRAM(s) Oral daily  sodium chloride 0.65% Nasal 1Spray(s) Both Nostrils four times a day PRN  dextrose 5%. 1000milliLiter(s) IV Continuous <Continuous>        PHYSICAL EXAM:  T(C): 36.6, Max: 36.9 (02-13 @ 15:00)  HR: 74 (67 - 80)  BP: 142/68 (99/55 - 150/70)  RR: 14 (14 - 18)  SpO2: 96% (96% - 98%)  Wt(kg): --  I&O's Summary    I & Os for current day (as of 14 Feb 2017 08:15)  =============================================  IN: 240 ml / OUT: 300 ml / NET: -60 ml    Appearance: Normal	  Cardiovascular: Normal S1 S2, No JVD, No murmurs, No edema  Respiratory: Lungs clear to auscultation	  Psychiatry: A & O x 3, Mood & affect appropriate  Gastrointestinal:  Soft, Non-tender, + BS	  Skin: No rashes, No ecchymoses, No cyanosis  Neurologic: Non-focal  Extremities: Normal range of motion, No clubbing, cyanosis or edema  Vascular: Peripheral pulses palpable 2+ bilaterally    TELEMETRY: 	  SR                                  11.2   9.16  )-----------( 175      ( 13 Feb 2017 07:05 )             34.5     13 Feb 2017 07:05    139    |  101    |  7.0    ----------------------------<  252    3.9     |  24.0   |  0.28     Ca    9.5        13 Feb 2017 07:05  Mg     1.6       13 Feb 2017 07:05        ASSESSMENT/PLAN: ANGELA ACHARYA is a 71F a/w syncope, mild troponin increase (peak 0.16), trending down, h/o CAD/stents, TAVR, DM, HTN, HL, obesity, COPD  - No significant arrhythmia on telemetry  - Echocardiogram, still pending  - Further work-up & testing (MRI brain, MRA, EEG, etc.) per medicine/neurology  - Mild troponin increase is non-specific.  Patient is currently chest pain free with no acute EKG changes.  Continue medical treatment for now pending the results of the above.  - Rhythm/hemodynamics stable = continue current doses of Toprol XL 50 daily & Losartan 25 daily for now and titrate PRN  - Plavix 75 daily in place	as well as ASA

## 2017-02-14 NOTE — PROGRESS NOTE ADULT - SUBJECTIVE AND OBJECTIVE BOX
SUBJECTIVE    REVIEW OF SYSTEMS    General: Not in any pain	    Skin/Breast: No rash  	  ENMT: No visual problems, no sore throat	    Respiratory and Thorax: No cough, No CP, No SOB  	  Cardiovascular: No CP, No palpitations    Gastrointestinal: No Abd pain, No N/V/D    Musculoskeletal: No Joint pain, No back pain	    Neurological: No headache    Psychiatric: No anxiety      OBJECTIVE    Vital Signs Last 24 Hrs  T(C): 36.6, Max: 36.6 (02-14 @ 05:22)  T(F): 97.8, Max: 97.9 (02-14 @ 05:22)  HR: 72 (67 - 79)  BP: 102/62 (99/55 - 144/60)  BP(mean): --  RR: 16 (14 - 18)  SpO2: 96% (96% - 98%)    PHYSICAL EXAM:    Constitutional: Not in any distress    Eyes: No conjunctival injection    ENMT: No oral lesions    Neck: No nodes, no adenopathy    Back: Straight, no defects    Respiratory: clear b/l    Cardiovascular: RRR, no murmur    Gastrointestinal: soft, NT, ND    Extremities: No edema, no erythema    Neurological: no focal deficit    Skin: No rash      MEDICATIONS  (STANDING):  enoxaparin Injectable 40milliGRAM(s) SubCutaneous daily  clopidogrel Tablet 75milliGRAM(s) Oral daily  carbidopa/levodopa  25/100 1Tablet(s) Oral four times a day  sertraline 100milliGRAM(s) Oral daily  gabapentin 200milliGRAM(s) Oral daily  metoprolol succinate ER 50milliGRAM(s) Oral daily  losartan 25milliGRAM(s) Oral daily  rotigotine patch 6 mG/24 Hr(s) 1Patch Transdermal every 24 hours  insulin lispro (HumaLOG) corrective regimen sliding scale  SubCutaneous three times a day before meals  insulin lispro (HumaLOG) corrective regimen sliding scale  SubCutaneous at bedtime  dextrose 5%. 1000milliLiter(s) IV Continuous <Continuous>  dextrose 50% Injectable 12.5Gram(s) IV Push once  dextrose 50% Injectable 25Gram(s) IV Push once  dextrose 50% Injectable 25Gram(s) IV Push once  nystatin Powder 1Application(s) Topical two times a day    MEDICATIONS  (PRN):  sodium chloride 0.65% Nasal 1Spray(s) Both Nostrils four times a day PRN Nasal Congestion  dextrose Gel 1Dose(s) Oral once PRN Blood Glucose LESS THAN 70 milliGRAM(s)/deciliter  glucagon  Injectable 1milliGRAM(s) IntraMuscular once PRN Glucose LESS THAN 70 milligrams/deciliter                              11.2   9.16  )-----------( 175      ( 13 Feb 2017 07:05 )             34.5     13 Feb 2017 07:05    139    |  101    |  7.0    ----------------------------<  252    3.9     |  24.0   |  0.28     Ca    9.5        13 Feb 2017 07:05  Mg     1.6       13 Feb 2017 07:05      Allergies    penicillin (Angioedema)  Ultram (Rash)    Intolerances

## 2017-02-14 NOTE — PROGRESS NOTE ADULT - PROBLEM SELECTOR PLAN 1
Await for EEG.  Continue with current PD meds.  Continue with antiplatelets meds.  Orthostats.  Continue with Cardio w/u.  Will follow.

## 2017-02-14 NOTE — PROGRESS NOTE ADULT - SUBJECTIVE AND OBJECTIVE BOX
INTERVAL HISTORY:  Seen at bedside.  No new changes.  Comfortable.    penicillin (Angioedema)  Ultram (Rash)      VITAL SIGNS:  Vital Signs Last 24 Hrs  T(C): 36.6, Max: 36.9 (02-13 @ 15:00)  T(F): 97.9, Max: 98.4 (02-13 @ 15:00)  HR: 74 (67 - 79)  BP: 142/68 (99/55 - 144/60)  BP(mean): --  RR: 14 (14 - 18)  SpO2: 96% (96% - 98%)    PHYSICAL EXAMINATION:  General: Well-developed, well nourished, in no acute distress.  Eyes: Conjunctiva and sclera clear.  Neurologic:  - Mental Status:  Alert, awake, oriented to person, place, and time; Speech is normal; Affect is normal.  - Cranial Nerves: II-XI intact.  - Motor:  Strength is 5/5 throughout.  There is no pronator drift.  Normal muscle bulk and tone throughout.  - Reflexes:  2+ throughout; Plantar downwards  - Sensory:  Intact to light touch, pin prick, vibration, and joint-position sense throughout.  - Coordination:  No dysmetria/dysdiadochokinesis.  - Gait:  Deferred      MEDS:  MEDICATIONS  (STANDING):  enoxaparin Injectable 40milliGRAM(s) SubCutaneous daily  clopidogrel Tablet 75milliGRAM(s) Oral daily  carbidopa/levodopa  25/100 1Tablet(s) Oral four times a day  sertraline 100milliGRAM(s) Oral daily  gabapentin 200milliGRAM(s) Oral daily  metoprolol succinate ER 50milliGRAM(s) Oral daily  losartan 25milliGRAM(s) Oral daily  rotigotine patch 6 mG/24 Hr(s) 1Patch Transdermal every 24 hours  insulin lispro (HumaLOG) corrective regimen sliding scale  SubCutaneous three times a day before meals  insulin lispro (HumaLOG) corrective regimen sliding scale  SubCutaneous at bedtime  dextrose 5%. 1000milliLiter(s) IV Continuous <Continuous>  dextrose 50% Injectable 12.5Gram(s) IV Push once  dextrose 50% Injectable 25Gram(s) IV Push once  dextrose 50% Injectable 25Gram(s) IV Push once    MEDICATIONS  (PRN):  sodium chloride 0.65% Nasal 1Spray(s) Both Nostrils four times a day PRN Nasal Congestion  dextrose Gel 1Dose(s) Oral once PRN Blood Glucose LESS THAN 70 milliGRAM(s)/deciliter  glucagon  Injectable 1milliGRAM(s) IntraMuscular once PRN Glucose LESS THAN 70 milligrams/deciliter      LABS:                          11.2   9.16  )-----------( 175      ( 13 Feb 2017 07:05 )             34.5     13 Feb 2017 07:05    139    |  101    |  7.0    ----------------------------<  252    3.9     |  24.0   |  0.28     Ca    9.5        13 Feb 2017 07:05  Mg     1.6       13 Feb 2017 07:05            RADIOLOGY & ADDITIONAL STUDIES:      EEG - Pending    IMPRESSION: Parkinsons Dx; Syncope

## 2017-02-15 ENCOUNTER — TRANSCRIPTION ENCOUNTER (OUTPATIENT)
Age: 72
End: 2017-02-15

## 2017-02-15 VITALS
OXYGEN SATURATION: 96 % | HEART RATE: 56 BPM | SYSTOLIC BLOOD PRESSURE: 128 MMHG | TEMPERATURE: 97 F | RESPIRATION RATE: 16 BRPM | DIASTOLIC BLOOD PRESSURE: 62 MMHG

## 2017-02-15 PROCEDURE — 97163 PT EVAL HIGH COMPLEX 45 MIN: CPT

## 2017-02-15 PROCEDURE — 95819 EEG AWAKE AND ASLEEP: CPT

## 2017-02-15 PROCEDURE — 85027 COMPLETE CBC AUTOMATED: CPT

## 2017-02-15 PROCEDURE — 93880 EXTRACRANIAL BILAT STUDY: CPT

## 2017-02-15 PROCEDURE — 80053 COMPREHEN METABOLIC PANEL: CPT

## 2017-02-15 PROCEDURE — 80061 LIPID PANEL: CPT

## 2017-02-15 PROCEDURE — 70450 CT HEAD/BRAIN W/O DYE: CPT

## 2017-02-15 PROCEDURE — 99231 SBSQ HOSP IP/OBS SF/LOW 25: CPT

## 2017-02-15 PROCEDURE — 83735 ASSAY OF MAGNESIUM: CPT

## 2017-02-15 PROCEDURE — 36415 COLL VENOUS BLD VENIPUNCTURE: CPT

## 2017-02-15 PROCEDURE — 85610 PROTHROMBIN TIME: CPT

## 2017-02-15 PROCEDURE — 93005 ELECTROCARDIOGRAM TRACING: CPT

## 2017-02-15 PROCEDURE — 83036 HEMOGLOBIN GLYCOSYLATED A1C: CPT

## 2017-02-15 PROCEDURE — 85730 THROMBOPLASTIN TIME PARTIAL: CPT

## 2017-02-15 PROCEDURE — 94660 CPAP INITIATION&MGMT: CPT

## 2017-02-15 PROCEDURE — 93307 TTE W/O DOPPLER COMPLETE: CPT

## 2017-02-15 PROCEDURE — 82962 GLUCOSE BLOOD TEST: CPT

## 2017-02-15 PROCEDURE — 82550 ASSAY OF CK (CPK): CPT

## 2017-02-15 PROCEDURE — 70551 MRI BRAIN STEM W/O DYE: CPT

## 2017-02-15 PROCEDURE — 84484 ASSAY OF TROPONIN QUANT: CPT

## 2017-02-15 PROCEDURE — 80048 BASIC METABOLIC PNL TOTAL CA: CPT

## 2017-02-15 PROCEDURE — 99285 EMERGENCY DEPT VISIT HI MDM: CPT | Mod: 25

## 2017-02-15 RX ADMIN — CLOPIDOGREL BISULFATE 75 MILLIGRAM(S): 75 TABLET, FILM COATED ORAL at 13:10

## 2017-02-15 RX ADMIN — CARBIDOPA AND LEVODOPA 1 TABLET(S): 25; 100 TABLET ORAL at 13:10

## 2017-02-15 RX ADMIN — GABAPENTIN 200 MILLIGRAM(S): 400 CAPSULE ORAL at 13:10

## 2017-02-15 RX ADMIN — CARBIDOPA AND LEVODOPA 1 TABLET(S): 25; 100 TABLET ORAL at 07:00

## 2017-02-15 RX ADMIN — SERTRALINE 100 MILLIGRAM(S): 25 TABLET, FILM COATED ORAL at 13:09

## 2017-02-15 RX ADMIN — Medication 50 MILLIGRAM(S): at 07:00

## 2017-02-15 RX ADMIN — Medication 1: at 08:54

## 2017-02-15 RX ADMIN — LOSARTAN POTASSIUM 25 MILLIGRAM(S): 100 TABLET, FILM COATED ORAL at 07:00

## 2017-02-15 RX ADMIN — NYSTATIN CREAM 1 APPLICATION(S): 100000 CREAM TOPICAL at 07:00

## 2017-02-15 NOTE — PROGRESS NOTE ADULT - SUBJECTIVE AND OBJECTIVE BOX
INTERVAL HISTORY:  Seen at bedside; doing well and awaiting discharge.  No new issues.    penicillin (Angioedema)  Ultram (Rash)      VITAL SIGNS:  Vital Signs Last 24 Hrs  T(C): 36.8, Max: 37.2 (02-14 @ 22:53)  T(F): 98.3, Max: 98.9 (02-14 @ 22:53)  HR: 64 (64 - 70)  BP: 133/69 (122/62 - 134/70)  BP(mean): --  RR: 17 (16 - 18)  SpO2: 95% (95% - 99%)    PHYSICAL EXAMINATION:  General: Well-developed, well nourished, in no acute distress.  Eyes: Conjunctiva and sclera clear.  Neurologic:  - Mental Status:  Alert, awake, oriented to person, place, and time; Speech is normal; Affect is normal.  - Cranial Nerves: II-XI intact.  - Motor:  Strength is 5/5 throughout.  There is no pronator drift.  Normal muscle bulk and tone throughout.  - Reflexes:  2+ throughout  - Sensory:  Intact to light touch, pin prick, vibration, and joint-position sense throughout.  - Coordination:  No dysmetria/dysdiadochokinesis.    MEDS:  MEDICATIONS  (STANDING):  enoxaparin Injectable 40milliGRAM(s) SubCutaneous daily  clopidogrel Tablet 75milliGRAM(s) Oral daily  carbidopa/levodopa  25/100 1Tablet(s) Oral four times a day  sertraline 100milliGRAM(s) Oral daily  gabapentin 200milliGRAM(s) Oral daily  metoprolol succinate ER 50milliGRAM(s) Oral daily  losartan 25milliGRAM(s) Oral daily  rotigotine patch 6 mG/24 Hr(s) 1Patch Transdermal every 24 hours  insulin lispro (HumaLOG) corrective regimen sliding scale  SubCutaneous three times a day before meals  insulin lispro (HumaLOG) corrective regimen sliding scale  SubCutaneous at bedtime  dextrose 5%. 1000milliLiter(s) IV Continuous <Continuous>  dextrose 50% Injectable 12.5Gram(s) IV Push once  dextrose 50% Injectable 25Gram(s) IV Push once  dextrose 50% Injectable 25Gram(s) IV Push once  nystatin Powder 1Application(s) Topical two times a day    MEDICATIONS  (PRN):  sodium chloride 0.65% Nasal 1Spray(s) Both Nostrils four times a day PRN Nasal Congestion  dextrose Gel 1Dose(s) Oral once PRN Blood Glucose LESS THAN 70 milliGRAM(s)/deciliter  glucagon  Injectable 1milliGRAM(s) IntraMuscular once PRN Glucose LESS THAN 70 milligrams/deciliter  ibuprofen  Tablet 400milliGRAM(s) Oral two times a day PRN headache          RADIOLOGY & ADDITIONAL STUDIES:      EEG 2/14/17 - No seizure activity.    IMPRESSION:  Parkinsons disease.  Syncope.

## 2017-02-15 NOTE — DISCHARGE NOTE ADULT - CARE PLAN
Principal Discharge DX:	Syncope  Goal:	home  Instructions for follow-up, activity and diet:	regular diet  Secondary Diagnosis:	Diabetes  Secondary Diagnosis:	Parkinson disease

## 2017-02-15 NOTE — PROGRESS NOTE ADULT - SUBJECTIVE AND OBJECTIVE BOX
SUBJECTIVE    REVIEW OF SYSTEMS    General: Not in any pain	    Skin/Breast: No rash  	  ENMT: No visual problems, no sore throat	    Respiratory and Thorax: No cough, No CP, No SOB  	  Cardiovascular: No CP, No palpitations    Gastrointestinal: No Abd pain, No N/V/D    Musculoskeletal: No Joint pain, No back pain	    Neurological: No headache    Psychiatric: No anxiety      OBJECTIVE    Vital Signs Last 24 Hrs  T(C): 36.8, Max: 37.2 (02-14 @ 22:53)  T(F): 98.3, Max: 98.9 (02-14 @ 22:53)  HR: 64 (64 - 72)  BP: 133/69 (102/62 - 134/70)  BP(mean): --  RR: 17 (16 - 18)  SpO2: 95% (95% - 99%)    PHYSICAL EXAM:    Constitutional: Not in any distress    Eyes: No conjunctival injection    ENMT: No oral lesions    Neck: No nodes, no adenopathy    Back: Straight, no defects    Respiratory: clear b/l    Cardiovascular: RRR, no murmur    Gastrointestinal: soft, NT, ND    Extremities: No edema, no erythema    Neurological: no focal deficit    Skin: No rash      MEDICATIONS  (STANDING):  enoxaparin Injectable 40milliGRAM(s) SubCutaneous daily  clopidogrel Tablet 75milliGRAM(s) Oral daily  carbidopa/levodopa  25/100 1Tablet(s) Oral four times a day  sertraline 100milliGRAM(s) Oral daily  gabapentin 200milliGRAM(s) Oral daily  metoprolol succinate ER 50milliGRAM(s) Oral daily  losartan 25milliGRAM(s) Oral daily  rotigotine patch 6 mG/24 Hr(s) 1Patch Transdermal every 24 hours  insulin lispro (HumaLOG) corrective regimen sliding scale  SubCutaneous three times a day before meals  insulin lispro (HumaLOG) corrective regimen sliding scale  SubCutaneous at bedtime  dextrose 5%. 1000milliLiter(s) IV Continuous <Continuous>  dextrose 50% Injectable 12.5Gram(s) IV Push once  dextrose 50% Injectable 25Gram(s) IV Push once  dextrose 50% Injectable 25Gram(s) IV Push once  nystatin Powder 1Application(s) Topical two times a day    MEDICATIONS  (PRN):  sodium chloride 0.65% Nasal 1Spray(s) Both Nostrils four times a day PRN Nasal Congestion  dextrose Gel 1Dose(s) Oral once PRN Blood Glucose LESS THAN 70 milliGRAM(s)/deciliter  glucagon  Injectable 1milliGRAM(s) IntraMuscular once PRN Glucose LESS THAN 70 milligrams/deciliter  ibuprofen  Tablet 400milliGRAM(s) Oral two times a day PRN headache                Allergies    penicillin (Angioedema)  Ultram (Rash)    Intolerances

## 2017-02-15 NOTE — PROGRESS NOTE ADULT - SUBJECTIVE AND OBJECTIVE BOX
INTERVAL HISTORY:  feels well, denies CP, SOB, palpitation  	  MEDICATIONS:  metoprolol succinate ER 50milliGRAM(s) Oral daily  losartan 25milliGRAM(s) Oral daily  carbidopa/levodopa  25/100 1Tablet(s) Oral four times a day  sertraline 100milliGRAM(s) Oral daily  gabapentin 200milliGRAM(s) Oral daily  rotigotine patch 6 mG/24 Hr(s) 1Patch Transdermal every 24 hours  ibuprofen  Tablet 400milliGRAM(s) Oral two times a day PRN  insulin lispro (HumaLOG) corrective regimen sliding scale  SubCutaneous three times a day before meals  insulin lispro (HumaLOG) corrective regimen sliding scale  SubCutaneous at bedtime  dextrose Gel 1Dose(s) Oral once PRN  dextrose 50% Injectable 12.5Gram(s) IV Push once  dextrose 50% Injectable 25Gram(s) IV Push once  dextrose 50% Injectable 25Gram(s) IV Push once  glucagon  Injectable 1milliGRAM(s) IntraMuscular once PRN  enoxaparin Injectable 40milliGRAM(s) SubCutaneous daily  clopidogrel Tablet 75milliGRAM(s) Oral daily  sodium chloride 0.65% Nasal 1Spray(s) Both Nostrils four times a day PRN  dextrose 5%. 1000milliLiter(s) IV Continuous <Continuous>  nystatin Powder 1Application(s) Topical two times a day        PHYSICAL EXAM:  T(C): 36.8, Max: 37.2 (02-14 @ 22:53)  HR: 64 (64 - 72)  BP: 133/69 (102/62 - 134/70)  RR: 17 (16 - 18)  SpO2: 95% (95% - 99%)  Wt(kg): --  I&O's Summary    I & Os for current day (as of 15 Feb 2017 09:06)  =============================================  IN: 240 ml / OUT: 350 ml / NET: -110 ml        Appearance: Normal		  Cardiovascular: Normal S1 S2, No JVD, No murmurs, No edema  Respiratory: Lungs clear to auscultation	  Gastrointestinal:  Soft, Non-tender, + BS	  Skin: No rashes, No ecchymoses, No cyanosis  Neurologic: Non-focal  Extremities: Normal range of motion, No clubbing, cyanosis or edema  Vascular: Peripheral pulses palpable 2+ bilaterally    TELEMETRY: 	    ECG:  	  RADIOLOGY:   DIAGNOSTIC TESTING:  [ ] Echocardiogram: nl EF, grade II diastolic dysf. Normal prosthetic AV fx       ASSESSMENT/PLAN: ANGELA ACHARYA is a 71F a/w syncope, mild troponin increase (peak 0.16), trending down, h/o CAD/stents, TAVR, DM, HTN, HL, obesity, COPD  - No significant arrhythmia on telemetry  - Echocardiogram as above, nl TAVR fx, Moderate Carotid disease.  - Further work-up & testing (MRI brain, MRA, EEG, etc.) per medicine/neurology  - Mild troponin increase is non-specific.  Patient is currently chest pain free with no acute EKG changes.  Continue medical treatment for now pending the results of the above.  - Rhythm/hemodynamics stable = continue current medications.  - Stable CV status

## 2017-02-15 NOTE — PROGRESS NOTE ADULT - PROBLEM SELECTOR PLAN 1
Neuro stable.  Continue with current parkinsons meds.  DVT prophylaxis.  Continue with current antiplatelet treatments.  Outpatient follow up at [facility] at [phone] or [phone].   Reconsult PRN.

## 2017-02-15 NOTE — DISCHARGE NOTE ADULT - CARE PROVIDER_API CALL
Orlin Lucero), Family Medicine  [address] [place] [id number]  Phone: [phone]  Fax: [phone]

## 2017-02-15 NOTE — PROGRESS NOTE ADULT - PROBLEM SELECTOR PROBLEM 2
Syncope
Parkinson disease
Syncope
Syncope, unspecified syncope type

## 2017-05-12 ENCOUNTER — APPOINTMENT (OUTPATIENT)
Dept: OTOLARYNGOLOGY | Facility: CLINIC | Age: 72
End: 2017-05-12

## 2017-05-12 VITALS
HEIGHT: 60 IN | HEART RATE: 72 BPM | SYSTOLIC BLOOD PRESSURE: 147 MMHG | BODY MASS INDEX: 36.57 KG/M2 | DIASTOLIC BLOOD PRESSURE: 74 MMHG | WEIGHT: 186.29 LBS

## 2017-05-12 DIAGNOSIS — Z82.49 FAMILY HISTORY OF ISCHEMIC HEART DISEASE AND OTHER DISEASES OF THE CIRCULATORY SYSTEM: ICD-10-CM

## 2017-05-12 DIAGNOSIS — Z83.3 FAMILY HISTORY OF DIABETES MELLITUS: ICD-10-CM

## 2017-05-12 DIAGNOSIS — E03.9 HYPOTHYROIDISM, UNSPECIFIED: ICD-10-CM

## 2017-05-12 DIAGNOSIS — Z80.9 FAMILY HISTORY OF MALIGNANT NEOPLASM, UNSPECIFIED: ICD-10-CM

## 2017-05-12 RX ORDER — FUROSEMIDE 40 MG/1
40 TABLET ORAL
Refills: 0 | Status: ACTIVE | COMMUNITY

## 2017-05-12 RX ORDER — LEVOTHYROXINE SODIUM 0.03 MG/1
25 TABLET ORAL
Refills: 0 | Status: ACTIVE | COMMUNITY

## 2017-05-12 RX ORDER — CARBIDOPA, LEVODOPA AND ENTACAPONE 25; 100; 200 MG/1; MG/1; MG/1
25-100-200 TABLET, FILM COATED ORAL
Refills: 0 | Status: ACTIVE | COMMUNITY

## 2017-05-12 RX ORDER — CARBIDOPA AND LEVODOPA 50; 200 MG/1; MG/1
50-200 TABLET, EXTENDED RELEASE ORAL
Refills: 0 | Status: ACTIVE | COMMUNITY

## 2017-05-12 RX ORDER — INSULIN DETEMIR 100 [IU]/ML
100 INJECTION, SOLUTION SUBCUTANEOUS
Refills: 0 | Status: ACTIVE | COMMUNITY

## 2017-05-12 RX ORDER — SERTRALINE HYDROCHLORIDE 100 MG/1
100 TABLET, FILM COATED ORAL
Refills: 0 | Status: ACTIVE | COMMUNITY

## 2017-05-12 RX ORDER — ALPRAZOLAM 1 MG/1
1 TABLET ORAL
Refills: 0 | Status: ACTIVE | COMMUNITY

## 2017-05-15 ENCOUNTER — OUTPATIENT (OUTPATIENT)
Dept: OUTPATIENT SERVICES | Facility: HOSPITAL | Age: 72
LOS: 1 days | End: 2017-05-15

## 2017-05-15 ENCOUNTER — OUTPATIENT (OUTPATIENT)
Dept: OUTPATIENT SERVICES | Facility: HOSPITAL | Age: 72
LOS: 1 days | End: 2017-05-15
Payer: MEDICARE

## 2017-05-15 ENCOUNTER — APPOINTMENT (OUTPATIENT)
Dept: NUCLEAR MEDICINE | Facility: CLINIC | Age: 72
End: 2017-05-15

## 2017-05-15 VITALS
DIASTOLIC BLOOD PRESSURE: 60 MMHG | SYSTOLIC BLOOD PRESSURE: 120 MMHG | RESPIRATION RATE: 18 BRPM | OXYGEN SATURATION: 96 % | HEART RATE: 82 BPM | TEMPERATURE: 99 F | WEIGHT: 184.09 LBS | HEIGHT: 58 IN

## 2017-05-15 DIAGNOSIS — E66.9 OBESITY, UNSPECIFIED: Chronic | ICD-10-CM

## 2017-05-15 DIAGNOSIS — C02.9 MALIGNANT NEOPLASM OF TONGUE, UNSPECIFIED: ICD-10-CM

## 2017-05-15 DIAGNOSIS — G47.30 SLEEP APNEA, UNSPECIFIED: ICD-10-CM

## 2017-05-15 DIAGNOSIS — C01 MALIGNANT NEOPLASM OF BASE OF TONGUE: ICD-10-CM

## 2017-05-15 DIAGNOSIS — Z95.2 PRESENCE OF PROSTHETIC HEART VALVE: Chronic | ICD-10-CM

## 2017-05-15 DIAGNOSIS — Z95.5 PRESENCE OF CORONARY ANGIOPLASTY IMPLANT AND GRAFT: Chronic | ICD-10-CM

## 2017-05-15 DIAGNOSIS — Z95.5 PRESENCE OF CORONARY ANGIOPLASTY IMPLANT AND GRAFT: ICD-10-CM

## 2017-05-15 DIAGNOSIS — Z95.1 PRESENCE OF AORTOCORONARY BYPASS GRAFT: Chronic | ICD-10-CM

## 2017-05-15 DIAGNOSIS — E11.9 TYPE 2 DIABETES MELLITUS WITHOUT COMPLICATIONS: ICD-10-CM

## 2017-05-15 LAB
ALBUMIN SERPL ELPH-MCNC: 4 G/DL — SIGNIFICANT CHANGE UP (ref 3.3–5)
ALP SERPL-CCNC: 113 U/L — SIGNIFICANT CHANGE UP (ref 40–120)
ALT FLD-CCNC: 6 U/L — SIGNIFICANT CHANGE UP (ref 4–33)
AST SERPL-CCNC: 22 U/L — SIGNIFICANT CHANGE UP (ref 4–32)
BILIRUB SERPL-MCNC: 0.9 MG/DL — SIGNIFICANT CHANGE UP (ref 0.2–1.2)
BLD GP AB SCN SERPL QL: NEGATIVE — SIGNIFICANT CHANGE UP
BUN SERPL-MCNC: 14 MG/DL — SIGNIFICANT CHANGE UP (ref 7–23)
CALCIUM SERPL-MCNC: 9.4 MG/DL — SIGNIFICANT CHANGE UP (ref 8.4–10.5)
CHLORIDE SERPL-SCNC: 104 MMOL/L — SIGNIFICANT CHANGE UP (ref 98–107)
CO2 SERPL-SCNC: 21 MMOL/L — LOW (ref 22–31)
CREAT SERPL-MCNC: 0.54 MG/DL — SIGNIFICANT CHANGE UP (ref 0.5–1.3)
GLUCOSE SERPL-MCNC: 207 MG/DL — HIGH (ref 70–99)
HBA1C BLD-MCNC: 7.4 % — HIGH (ref 4–5.6)
HCT VFR BLD CALC: 32.1 % — LOW (ref 34.5–45)
HGB BLD-MCNC: 9.1 G/DL — LOW (ref 11.5–15.5)
MCHC RBC-ENTMCNC: 21 PG — LOW (ref 27–34)
MCHC RBC-ENTMCNC: 28.3 % — LOW (ref 32–36)
MCV RBC AUTO: 74 FL — LOW (ref 80–100)
PLATELET # BLD AUTO: 252 K/UL — SIGNIFICANT CHANGE UP (ref 150–400)
PMV BLD: 10.3 FL — SIGNIFICANT CHANGE UP (ref 7–13)
POTASSIUM SERPL-MCNC: 3.4 MMOL/L — LOW (ref 3.5–5.3)
POTASSIUM SERPL-SCNC: 3.4 MMOL/L — LOW (ref 3.5–5.3)
PROT SERPL-MCNC: 6.4 G/DL — SIGNIFICANT CHANGE UP (ref 6–8.3)
RBC # BLD: 4.34 M/UL — SIGNIFICANT CHANGE UP (ref 3.8–5.2)
RBC # FLD: 15.7 % — HIGH (ref 10.3–14.5)
RH IG SCN BLD-IMP: POSITIVE — SIGNIFICANT CHANGE UP
SODIUM SERPL-SCNC: 142 MMOL/L — SIGNIFICANT CHANGE UP (ref 135–145)
WBC # BLD: 6.13 K/UL — SIGNIFICANT CHANGE UP (ref 3.8–10.5)
WBC # FLD AUTO: 6.13 K/UL — SIGNIFICANT CHANGE UP (ref 3.8–10.5)

## 2017-05-15 PROCEDURE — 93010 ELECTROCARDIOGRAM REPORT: CPT

## 2017-05-15 RX ORDER — SODIUM CHLORIDE 9 MG/ML
1000 INJECTION, SOLUTION INTRAVENOUS
Qty: 0 | Refills: 0 | Status: DISCONTINUED | OUTPATIENT
Start: 2017-05-17 | End: 2017-05-17

## 2017-05-15 NOTE — H&P PST ADULT - PROBLEM SELECTOR PLAN 1
Left Glossectomy, left Neck Dissection  and Tracheostomy 5/17/17.     CBC BMP T&S Hgba1C EKG Left Glossectomy, left Neck Dissection  and Tracheostomy 5/17/17.     CBC CMP T&S Hgba1C EKG

## 2017-05-15 NOTE — H&P PST ADULT - PMH
Anxiety    Aortic stenosis    COPD (chronic obstructive pulmonary disease)    Diabetes    Hypertension    Hypothyroid    Parkinson disease    Retinal defect, left Anxiety    Aortic stenosis    COPD (chronic obstructive pulmonary disease)    Diabetes    Hypertension    Hypothyroid    Parkinson disease    Retinal defect, left    Sleep apnea    Tongue cancer

## 2017-05-15 NOTE — H&P PST ADULT - PROBLEM SELECTOR PLAN 4
and aortic valve replacement (tissue valve, per pt).  Pt has a[[t with Cardiologist 5/16/17 for clearance.  Pt on Plavix and Aspirin. Pt to continue taking her aspirin,  Cardiologist to advise regarding Plavix.  I spoke with Brenda at Dr. Mendez's office and informed that pt is on Plavix and ASA.  She will discuss with Dr. Dias as surgery date may need to be delayed if Plavix needs to be discontinued. and aortic valve replacement (tissue valve, per pt).   Pt has appt with Cardiologist 5/16/17 for clearance.  Pt on Plavix and Aspirin. Pt to continue taking her aspirin. I spoke with Cardiology office.   Cardiologist to advise pt  regarding regimen for Plavix pre-op.  I spoke with Venita at Dr. Mendez's office and informed that pt is on Plavix and ASA.  She will discuss with Dr. Dias,  as surgery date may need to be rescheduled if Dr. Mendez wants Plavix to be discontinued prior to surgery,  per Venita.

## 2017-05-15 NOTE — H&P PST ADULT - CARDIOVASCULAR COMMENTS
Hx of aortic valve replacement (TAVR), 2015 per pt.  Pt also reports hx of coronary artery stents but does not remember  when she had stent placed.  Pt reports she wa told by Cardiologist to "never come off Plavix and aspirin"

## 2017-05-15 NOTE — H&P PST ADULT - PSH
H/O aortic valve replacement  History of valve replacement (cow, per pt). TAVR  History of coronary artery stent placement    S/P cholecystectomy    S/P hysterectomy    S/P orthopedic surgery, follow-up exam  back  S/P shoulder surgery  Bilateral Rotator cuff H/O aortic valve replacement  History of valve replacement (cow, per pt). TAVR approximately 2015 per pt  History of coronary artery stent placement    Obesity    S/P cholecystectomy    S/P hysterectomy    S/P orthopedic surgery, follow-up exam  back  S/P shoulder surgery  Bilateral Rotator cuff  Stented coronary artery  pt does not remember when she had stent

## 2017-05-15 NOTE — H&P PST ADULT - ASSESSMENT
70 y/o female with hx of tongue lesion which started approximately 1 year ago which would bleed at times, malignant on biopsy. Now scheduled for Left Glossectomy, left Neck Dissection  and Tracheostomy 5/17/17.

## 2017-05-15 NOTE — H&P PST ADULT - PROBLEM SELECTOR PLAN 3
Pt advised to take only 48 units of Levemir night prior to surgery; no Metformin night prior to surgery, or morning of surgery

## 2017-05-15 NOTE — H&P PST ADULT - FALLEN IN THE PAST
yes/pt. "blacked out" in February, and was hospitialized at [facility]. Pt. has since re-adjusted her medications.  pt. attributes to parkinson's disease.

## 2017-05-15 NOTE — H&P PST ADULT - HISTORY OF PRESENT ILLNESS
72 y/o female with hx of tongue lesion which started approximately 1 year ago which would bleed at times, malignant on biopsy. Now scheduled for Left Glossectomy, left Neck Dissection  abd Tracheostomy 5/17/17. 72 y/o female with hx of tongue lesion which started approximately 1 year ago which would bleed at times, malignant on biopsy. Now scheduled for Left Glossectomy, left Neck Dissection  and Tracheostomy 5/17/17.

## 2017-05-16 ENCOUNTER — APPOINTMENT (OUTPATIENT)
Dept: PULMONOLOGY | Facility: CLINIC | Age: 72
End: 2017-05-16

## 2017-05-16 VITALS
HEART RATE: 92 BPM | SYSTOLIC BLOOD PRESSURE: 110 MMHG | OXYGEN SATURATION: 93 % | WEIGHT: 186 LBS | BODY MASS INDEX: 36.52 KG/M2 | DIASTOLIC BLOOD PRESSURE: 70 MMHG | HEIGHT: 60 IN

## 2017-05-16 DIAGNOSIS — Z01.811 ENCOUNTER FOR PREPROCEDURAL RESPIRATORY EXAMINATION: ICD-10-CM

## 2017-05-16 NOTE — ASU PATIENT PROFILE, ADULT - FALLEN IN THE PAST
yes/pt. "blacked out" in February, and was hospitialized at [facility]. Pt. has since re-adjusted her medications.  pt. attributes to parkinson's disease.

## 2017-05-16 NOTE — ASU PATIENT PROFILE, ADULT - PMH
Anxiety    Aortic stenosis    COPD (chronic obstructive pulmonary disease)    Diabetes    Hypertension    Hypothyroid    Parkinson disease    Retinal defect, left    Sleep apnea    Tongue cancer

## 2017-05-16 NOTE — ASU PATIENT PROFILE, ADULT - PSH
H/O aortic valve replacement  History of valve replacement (cow, per pt). TAVR approximately 2015 per pt  History of coronary artery stent placement    Obesity    S/P cholecystectomy    S/P hysterectomy    S/P orthopedic surgery, follow-up exam  back  S/P shoulder surgery  Bilateral Rotator cuff  Stented coronary artery  pt does not remember when she had stent

## 2017-05-17 ENCOUNTER — INPATIENT (INPATIENT)
Facility: HOSPITAL | Age: 72
LOS: 14 days | Discharge: HOME | End: 2017-06-01
Attending: OTOLARYNGOLOGY | Admitting: OTOLARYNGOLOGY
Payer: MEDICARE

## 2017-05-17 ENCOUNTER — RESULT REVIEW (OUTPATIENT)
Age: 72
End: 2017-05-17

## 2017-05-17 ENCOUNTER — APPOINTMENT (OUTPATIENT)
Dept: OTOLARYNGOLOGY | Facility: HOSPITAL | Age: 72
End: 2017-05-17

## 2017-05-17 VITALS
DIASTOLIC BLOOD PRESSURE: 64 MMHG | SYSTOLIC BLOOD PRESSURE: 127 MMHG | TEMPERATURE: 99 F | WEIGHT: 184.09 LBS | HEIGHT: 58 IN | OXYGEN SATURATION: 96 % | RESPIRATION RATE: 16 BRPM | HEART RATE: 69 BPM

## 2017-05-17 DIAGNOSIS — C01 MALIGNANT NEOPLASM OF BASE OF TONGUE: ICD-10-CM

## 2017-05-17 DIAGNOSIS — Z96.611 PRESENCE OF RIGHT ARTIFICIAL SHOULDER JOINT: Chronic | ICD-10-CM

## 2017-05-17 DIAGNOSIS — Z95.5 PRESENCE OF CORONARY ANGIOPLASTY IMPLANT AND GRAFT: Chronic | ICD-10-CM

## 2017-05-17 DIAGNOSIS — E66.9 OBESITY, UNSPECIFIED: Chronic | ICD-10-CM

## 2017-05-17 DIAGNOSIS — Z96.612 PRESENCE OF LEFT ARTIFICIAL SHOULDER JOINT: Chronic | ICD-10-CM

## 2017-05-17 DIAGNOSIS — Z95.2 PRESENCE OF PROSTHETIC HEART VALVE: Chronic | ICD-10-CM

## 2017-05-17 LAB
CA-I SERPL-SCNC: 1.21 MMOL/L — SIGNIFICANT CHANGE UP (ref 1.15–1.29)
GAS PNL BLDV: 139 MMOL/L — SIGNIFICANT CHANGE UP (ref 136–146)
GLUCOSE BLDV-MCNC: 114 — HIGH (ref 70–99)
HCT VFR BLD CALC: 37.3 % — SIGNIFICANT CHANGE UP (ref 34.5–45)
HCT VFR BLDV CALC: 28.5 % — LOW (ref 34.5–45)
HGB BLD-MCNC: 11.1 G/DL — LOW (ref 11.5–15.5)
MCHC RBC-ENTMCNC: 22.1 PG — LOW (ref 27–34)
MCHC RBC-ENTMCNC: 29.8 % — LOW (ref 32–36)
MCV RBC AUTO: 74.2 FL — LOW (ref 80–100)
PLATELET # BLD AUTO: 259 K/UL — SIGNIFICANT CHANGE UP (ref 150–400)
PMV BLD: 9.9 FL — SIGNIFICANT CHANGE UP (ref 7–13)
POTASSIUM BLDV-SCNC: 3.6 MMOL/L — SIGNIFICANT CHANGE UP (ref 3.4–4.5)
RBC # BLD: 5.03 M/UL — SIGNIFICANT CHANGE UP (ref 3.8–5.2)
RBC # FLD: 15.7 % — HIGH (ref 10.3–14.5)
RH IG SCN BLD-IMP: POSITIVE — SIGNIFICANT CHANGE UP
WBC # BLD: 13.09 K/UL — HIGH (ref 3.8–10.5)
WBC # FLD AUTO: 13.09 K/UL — HIGH (ref 3.8–10.5)

## 2017-05-17 PROCEDURE — 31600 PLANNED TRACHEOSTOMY: CPT

## 2017-05-17 PROCEDURE — 88309 TISSUE EXAM BY PATHOLOGIST: CPT | Mod: 26

## 2017-05-17 PROCEDURE — 41120 PARTIAL REMOVAL OF TONGUE: CPT

## 2017-05-17 PROCEDURE — 99223 1ST HOSP IP/OBS HIGH 75: CPT | Mod: GC

## 2017-05-17 PROCEDURE — 71010: CPT | Mod: 26

## 2017-05-17 PROCEDURE — 88307 TISSUE EXAM BY PATHOLOGIST: CPT | Mod: 26,59

## 2017-05-17 PROCEDURE — 38724 REMOVAL OF LYMPH NODES NECK: CPT

## 2017-05-17 PROCEDURE — 88305 TISSUE EXAM BY PATHOLOGIST: CPT | Mod: 26

## 2017-05-17 RX ORDER — FUROSEMIDE 40 MG
40 TABLET ORAL DAILY
Qty: 0 | Refills: 0 | Status: DISCONTINUED | OUTPATIENT
Start: 2017-05-17 | End: 2017-05-17

## 2017-05-17 RX ORDER — INSULIN LISPRO 100/ML
VIAL (ML) SUBCUTANEOUS EVERY 6 HOURS
Qty: 0 | Refills: 0 | Status: DISCONTINUED | OUTPATIENT
Start: 2017-05-17 | End: 2017-05-22

## 2017-05-17 RX ORDER — DEXTROSE 50 % IN WATER 50 %
1 SYRINGE (ML) INTRAVENOUS ONCE
Qty: 0 | Refills: 0 | Status: DISCONTINUED | OUTPATIENT
Start: 2017-05-17 | End: 2017-05-17

## 2017-05-17 RX ORDER — LAMOTRIGINE 25 MG/1
100 TABLET, ORALLY DISINTEGRATING ORAL DAILY
Qty: 0 | Refills: 0 | Status: DISCONTINUED | OUTPATIENT
Start: 2017-05-17 | End: 2017-05-17

## 2017-05-17 RX ORDER — SODIUM CHLORIDE 9 MG/ML
1000 INJECTION INTRAMUSCULAR; INTRAVENOUS; SUBCUTANEOUS
Qty: 0 | Refills: 0 | Status: DISCONTINUED | OUTPATIENT
Start: 2017-05-17 | End: 2017-05-18

## 2017-05-17 RX ORDER — ONDANSETRON 8 MG/1
4 TABLET, FILM COATED ORAL ONCE
Qty: 0 | Refills: 0 | Status: DISCONTINUED | OUTPATIENT
Start: 2017-05-17 | End: 2017-06-01

## 2017-05-17 RX ORDER — DEXTROSE 50 % IN WATER 50 %
25 SYRINGE (ML) INTRAVENOUS ONCE
Qty: 0 | Refills: 0 | Status: DISCONTINUED | OUTPATIENT
Start: 2017-05-17 | End: 2017-05-17

## 2017-05-17 RX ORDER — CARBIDOPA AND LEVODOPA 25; 100 MG/1; MG/1
1 TABLET ORAL
Qty: 0 | Refills: 0 | Status: DISCONTINUED | OUTPATIENT
Start: 2017-05-17 | End: 2017-05-18

## 2017-05-17 RX ORDER — ASPIRIN/CALCIUM CARB/MAGNESIUM 324 MG
81 TABLET ORAL DAILY
Qty: 0 | Refills: 0 | Status: DISCONTINUED | OUTPATIENT
Start: 2017-05-17 | End: 2017-05-18

## 2017-05-17 RX ORDER — DEXTROSE 50 % IN WATER 50 %
12.5 SYRINGE (ML) INTRAVENOUS ONCE
Qty: 0 | Refills: 0 | Status: DISCONTINUED | OUTPATIENT
Start: 2017-05-17 | End: 2017-05-17

## 2017-05-17 RX ORDER — FUROSEMIDE 40 MG
40 TABLET ORAL DAILY
Qty: 0 | Refills: 0 | Status: DISCONTINUED | OUTPATIENT
Start: 2017-05-17 | End: 2017-05-18

## 2017-05-17 RX ORDER — ALPRAZOLAM 0.25 MG
1 TABLET ORAL AT BEDTIME
Qty: 0 | Refills: 0 | Status: DISCONTINUED | OUTPATIENT
Start: 2017-05-17 | End: 2017-05-17

## 2017-05-17 RX ORDER — SERTRALINE 25 MG/1
25 TABLET, FILM COATED ORAL DAILY
Qty: 0 | Refills: 0 | Status: DISCONTINUED | OUTPATIENT
Start: 2017-05-17 | End: 2017-05-18

## 2017-05-17 RX ORDER — ALBUTEROL 90 UG/1
1 AEROSOL, METERED ORAL EVERY 4 HOURS
Qty: 0 | Refills: 0 | Status: DISCONTINUED | OUTPATIENT
Start: 2017-05-17 | End: 2017-05-18

## 2017-05-17 RX ORDER — ALBUTEROL 90 UG/1
2.5 AEROSOL, METERED ORAL EVERY 6 HOURS
Qty: 0 | Refills: 0 | Status: DISCONTINUED | OUTPATIENT
Start: 2017-05-17 | End: 2017-06-01

## 2017-05-17 RX ORDER — SODIUM CHLORIDE 9 MG/ML
1000 INJECTION, SOLUTION INTRAVENOUS
Qty: 0 | Refills: 0 | Status: DISCONTINUED | OUTPATIENT
Start: 2017-05-17 | End: 2017-05-17

## 2017-05-17 RX ORDER — ACETAMINOPHEN 500 MG
650 TABLET ORAL EVERY 6 HOURS
Qty: 0 | Refills: 0 | Status: DISCONTINUED | OUTPATIENT
Start: 2017-05-17 | End: 2017-05-18

## 2017-05-17 RX ORDER — LOSARTAN POTASSIUM 100 MG/1
25 TABLET, FILM COATED ORAL DAILY
Qty: 0 | Refills: 0 | Status: DISCONTINUED | OUTPATIENT
Start: 2017-05-17 | End: 2017-05-18

## 2017-05-17 RX ORDER — ALPRAZOLAM 0.25 MG
1 TABLET ORAL AT BEDTIME
Qty: 0 | Refills: 0 | Status: DISCONTINUED | OUTPATIENT
Start: 2017-05-17 | End: 2017-05-22

## 2017-05-17 RX ORDER — INSULIN LISPRO 100/ML
VIAL (ML) SUBCUTANEOUS
Qty: 0 | Refills: 0 | Status: DISCONTINUED | OUTPATIENT
Start: 2017-05-17 | End: 2017-05-17

## 2017-05-17 RX ORDER — LOSARTAN POTASSIUM 100 MG/1
25 TABLET, FILM COATED ORAL DAILY
Qty: 0 | Refills: 0 | Status: DISCONTINUED | OUTPATIENT
Start: 2017-05-17 | End: 2017-05-17

## 2017-05-17 RX ORDER — HEPARIN SODIUM 5000 [USP'U]/ML
5000 INJECTION INTRAVENOUS; SUBCUTANEOUS EVERY 12 HOURS
Qty: 0 | Refills: 0 | Status: DISCONTINUED | OUTPATIENT
Start: 2017-05-17 | End: 2017-05-18

## 2017-05-17 RX ORDER — METOPROLOL TARTRATE 50 MG
50 TABLET ORAL DAILY
Qty: 0 | Refills: 0 | Status: DISCONTINUED | OUTPATIENT
Start: 2017-05-17 | End: 2017-05-17

## 2017-05-17 RX ORDER — METOPROLOL TARTRATE 50 MG
25 TABLET ORAL
Qty: 0 | Refills: 0 | Status: DISCONTINUED | OUTPATIENT
Start: 2017-05-17 | End: 2017-05-18

## 2017-05-17 RX ORDER — INSULIN GLARGINE 100 [IU]/ML
30 INJECTION, SOLUTION SUBCUTANEOUS AT BEDTIME
Qty: 0 | Refills: 0 | Status: DISCONTINUED | OUTPATIENT
Start: 2017-05-17 | End: 2017-05-17

## 2017-05-17 RX ORDER — CARBIDOPA AND LEVODOPA 25; 100 MG/1; MG/1
1 TABLET ORAL AT BEDTIME
Qty: 0 | Refills: 0 | Status: DISCONTINUED | OUTPATIENT
Start: 2017-05-17 | End: 2017-05-17

## 2017-05-17 RX ORDER — HYDROMORPHONE HYDROCHLORIDE 2 MG/ML
0.4 INJECTION INTRAMUSCULAR; INTRAVENOUS; SUBCUTANEOUS
Qty: 0 | Refills: 0 | Status: DISCONTINUED | OUTPATIENT
Start: 2017-05-17 | End: 2017-05-17

## 2017-05-17 RX ORDER — LEVOTHYROXINE SODIUM 125 MCG
25 TABLET ORAL DAILY
Qty: 0 | Refills: 0 | Status: DISCONTINUED | OUTPATIENT
Start: 2017-05-17 | End: 2017-05-18

## 2017-05-17 RX ORDER — INSULIN GLARGINE 100 [IU]/ML
30 INJECTION, SOLUTION SUBCUTANEOUS EVERY MORNING
Qty: 0 | Refills: 0 | Status: DISCONTINUED | OUTPATIENT
Start: 2017-05-17 | End: 2017-06-01

## 2017-05-17 RX ORDER — SERTRALINE 25 MG/1
25 TABLET, FILM COATED ORAL DAILY
Qty: 0 | Refills: 0 | Status: DISCONTINUED | OUTPATIENT
Start: 2017-05-17 | End: 2017-05-17

## 2017-05-17 RX ORDER — CARBIDOPA AND LEVODOPA 25; 100 MG/1; MG/1
1 TABLET ORAL
Qty: 0 | Refills: 0 | Status: DISCONTINUED | OUTPATIENT
Start: 2017-05-17 | End: 2017-05-17

## 2017-05-17 RX ORDER — GLUCAGON INJECTION, SOLUTION 0.5 MG/.1ML
1 INJECTION, SOLUTION SUBCUTANEOUS ONCE
Qty: 0 | Refills: 0 | Status: DISCONTINUED | OUTPATIENT
Start: 2017-05-17 | End: 2017-05-17

## 2017-05-17 RX ORDER — HYDROMORPHONE HYDROCHLORIDE 2 MG/ML
0.8 INJECTION INTRAMUSCULAR; INTRAVENOUS; SUBCUTANEOUS
Qty: 0 | Refills: 0 | Status: DISCONTINUED | OUTPATIENT
Start: 2017-05-17 | End: 2017-05-17

## 2017-05-17 RX ORDER — LAMOTRIGINE 25 MG/1
100 TABLET, ORALLY DISINTEGRATING ORAL DAILY
Qty: 0 | Refills: 0 | Status: DISCONTINUED | OUTPATIENT
Start: 2017-05-17 | End: 2017-05-18

## 2017-05-17 RX ORDER — LEVOTHYROXINE SODIUM 125 MCG
25 TABLET ORAL DAILY
Qty: 0 | Refills: 0 | Status: DISCONTINUED | OUTPATIENT
Start: 2017-05-17 | End: 2017-05-17

## 2017-05-17 RX ADMIN — Medication 650 MILLIGRAM(S): at 22:48

## 2017-05-17 RX ADMIN — Medication 650 MILLIGRAM(S): at 22:13

## 2017-05-17 RX ADMIN — ALBUTEROL 2.5 MILLIGRAM(S): 90 AEROSOL, METERED ORAL at 22:05

## 2017-05-17 RX ADMIN — SODIUM CHLORIDE 50 MILLILITER(S): 9 INJECTION INTRAMUSCULAR; INTRAVENOUS; SUBCUTANEOUS at 21:30

## 2017-05-17 RX ADMIN — SODIUM CHLORIDE 30 MILLILITER(S): 9 INJECTION, SOLUTION INTRAVENOUS at 10:08

## 2017-05-17 NOTE — CONSULT NOTE ADULT - ATTENDING COMMENTS
May 17th, 2017  10:40 PM    Day of Hospitalization  Day of Operation  Day of SICU Admission  Initial SICU Evaluation    I was asked to evaluate this patient, render a Surgical Critical Care Consultation and to provide ongoing care and monitoring for this patient. Her chart is reviewed and she is examined.    This patient is a 71-year-old obese, hypertensive, insulin requiring type 2 diabetic female who presented to the Otolaryngology service for elective surgery. Approximately one year ago she had been noted to have a mass on her tongue that intermittently bleed. A biopsy reportedly revealed squamous cell carcinoma prompting the current hospitalization. A PET scan, obtained on 5/15/17 revealed right axillary and retropectoral lymph nodes that were avid for FDG and she had activity in the left side of her tongue. There was no definite FDG avid cervical lymphadenopathy.    She has a medical history significant for having hypertension, hypothyroidism, chronic obstructive pulmonary disease, insulin requiring type 2 diabetes mellitus, obesity, and obstructive sleep apnea. 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 She has chronic diastolic heart failure and is known to have moderate pulmonary hypertension. She has had Parkinson's diease and has had TIA's and a right occipital infarction. She has anxiety and depression. She previously she had a detachment of her left retina and has had bilateral lens implants for cataracts. She had an bioprosthetic aortic valve replacement and she has had coronary artery stents inserted in 2013 (into a mid LAD lesion and a proximal and mid right coronary artery.) She has had a cholecystectomy, a hysterectomy, an appendectomy, and bilateral shoulder surgery for rotator cuff injuries. She has had a lumbar fusion on 5/18/10 at [facility]. On 11/22/14 she was evaluated in the ED at [facility] with complaints of having a cough and dyspnea. Because she had a systolic murmur, on 5/1/15 she was hospitalized on the Medical Service at [facility] for a Trans-esophageal echocardiogram. She was found to have aortic stenosis and was hospitalized on the Cardiac Surgery Surgery Service at [facility] from 6/10/15 - 6/12/15 for placement of a TAVR. She had a 23 mm Bliss Rafia valve inserted. She was again evaluated in the ED at [facility] on 10/19/15 when she had complaints of having dyspnea. Apparently the patient left the ED prior to being evaluated. She was hospitalized on the medical service at [facility] from 4/2/16 - 4/6/16 for evaluation and treatment of weakness and loose bowel movements. While hospitalized her diarrhea resolved and she was discharged to home. She was hospitalized on the Medical Service at [facility] from 2/11/17 - 2/15/17 for evaluation and treatment of syncope. She had a "slight troponin leak" and was treated medically. An EEG failed to reveal evidence of the presence of seizures. A transthoracic echocardiogram, obtained on 2/14/17 revealed a global left ventricular ejection fraction of 60% - 65% with grade II diastolic dysfunction. The right ventricular systolic pressures were estimated to be 52 mm Hg, consistent with moderate pulmonary hypertension. Prior to the current hospitalization she took:    1)	Aspirin		2)	Plavix		3)	Lopressor	4)	Losartan		5)	Lasix		6)	Glucophage	7)	Levemir	8)	Synthroid			9)	Sinemet		10)	Neupro		11)	Albuterol	12)	Xanax		13)	Zoloft		14)	Lamotrigine	15)	Motrin		16)	Gabapentin		17)	Vitamin D	18)	Potassium  	  She uses BIPAP nocturnally. She is reportedly allergic to penicillin that causes angioedema and ultram that results in a rash. She is intolerant of morphine. She is reportedly allergic to latex. She does not abuse ethanol and she smoked 1 pack of cigarettes per day for 10 years stopping 30 years ago. Her family history is significant for her father having had cirrhosis. She is  and resides with her spouse, Carmelo France [phone] / [phone] in a private home in [place]. There are four stairs to traverse to enter the resident and there are 10 steps to the bedroom. The patient has an adult daughter, Amelie Elizabeth ([phone]). The patient's daughter and the patient's son-in-law reside upstairs in the same residence as the patient. The patient has previously assigned her spouse to be her healthcare agent / proxy although at other times the patient assigned her daughter to be her healthcare agent / proxy . She has decreased vision and uses corrective lenses. She commonly ambulates with the assistance of a rolling walker and / or a cane. Anesthetics have previously resulted in hallucinations. She denied having had headaches, dizziness, or photophobia. She has not had chest pain, or palpitations but she has dyspnea when climbing a flight of stairs. She denied having had abdominal pain, nausea or emesis, diarrhea or constipation, fever or chills. She has not been jaundiced nor has she had dark urine. She has had post-nasal drip and denied having had bleeding from any area other than her tongue. She has had joint pain, mostly in her back. She has depression and anxiety and denied having had suicidal ideation. Her 10-point review of systems was otherwise negative. Her internist is Dr. Orlin Lucero ([phone]). The patient's cardiologist is Dr. Engel ([phone]).    On presentation to PAT / PST she had a:    BP	=	120/60		P	=	82	R	=	18		Temp	=	37.4		O2 Sat	=	96%	VAS	=	-    She was awake, alert, and fully oriented. She was in no acute distress and did not appear toxic.  She was anicteric. She had reactive pupils and her extra-occular movements were preserved.  She did not have thrush. She had a mass on the left side of her tongue. She did not have JVD.  Her lungs were clear and she had non-labored respirations. She had symmetrical chest wall movements.  She had regular heart tones. She did not have a murmur.  Her abdomen was soft and neither distended nor tender. She was obese. She did not have guarding or rebound. There were no palpable masses or abdominal wall hernias. She had healed surgical scars. There was no CVA tenderness and she had active bowel sounds.  Her extremities were well perfused. She did not have a rash.  She had a healed posterior lumbar surgical scar. She had normal strength. Her musculoskeletal exam was otherwise normal.    Laboratory tests revealed a:    WBC	=	6	Na		=	142	Bilirubin		=	0.9  Neutro	=	-	K		=	3.4	Alk Phos	=	113  Hgb	=	9	Cl		=	104	SGOT		=	22  Hct	=	32%	HCO3		=	21	SGPT		=	6  Plts	=	252	Glucose	=	207  			BUN		=	14	Hemoglobin A1-C	=	7.4  PT	=	-	Creat		=	0.5  PTT	=	-  INR	=	-	Albumin	=	4.0    An EKG revealed NSR. She had inverted "T" waves in lead aVR and aVL.    She was electively admitted to the Otolaryngology service and she underwent anesthesia and surgery commencing at approximately 12:30 PM on 5/17/17. She was ASA 3 and underwent general anesthesia via a 7.0 naso-tracheal tube that was easy to insert. Attempts were made to place a radial arterial cannula. A 16 Fr Lange catheter was inserted. During the approximately 6 hours of anesthesia she was given 2,500 ml of crystalloid and one unit (300 ml) of pRBC. She produced 40 ml of urine and was estimated to have lost 100 ml of blood. During the anesthesia she required vasopressor support with ephedrine and norsynephrine. She was given Lasix. Laboratory tests, obtained at approximately 1 PM, during the anesthesia revealed a:    Na		=	139	Hct	=	29%  K		=	3.6  Glucose	=	114    Prior to commencing the operation she was given 600 mg of clindamycin. She underwent a left glossectomy, left neck dissection and tracheostomy. On completion of the anesthesia and surgery she was transferred to the PACU. During the approximately one hour she was in the PACU she had a:    BP	= 134 - 148/45 - 65		P	= 60 - 67		R	= 12 - 17  Temp	= 37			O2 Sat	= 98% - 100%    She was given aspirin, heparin sq, albuterol, IV fluid, xanax, zoloft, Lamictal, Sinemet, Lasix, Insulin, Lopressor, Losartan, Tylenol, Percocet, and Dilaudid. She was subsequently transferred to the SICU where she is now seen.    While in the SICU she has had a:    BP	=	113 - 152/44 - 65  P	=	60 - 80		O2 Sat	=	94% - 100%  R	=	12 - 19		I/O	=	300 in/440 out (5/17 - 5/18 < 24 hours)  Temp	=	36.4 - 37.2			CRISTIAN = 63 ml    Glucose		=	192	Weight		=	83.5 Kg  				BMI		=	38.5    Awake, alert, and interactive. Communicates effectively. In no distress. Anicteric. Pupils reactive with post-operative changes. Extra-occular movements intact.  Small bore NGT in place.  No thrush. Tongue with operative changes. No bleeding.  Tracheostomy in place with some leonie-tracheal bloody secretions. CRISTIAN sero-sanguinous.  Lungs with scattered rhonchi. Clear with coughing.  COR - RRR without a murmur.  Abdomen obese. Neither tender nor distended.  Extremities well perfused.    WBC	=	13  Neutro	=	-  Hgb	=	11  Hct	=	37%  Plts	=	259    A chest radiograph revealed the presence of a small bore NGT that ended below the diaphragm. She had a tracheostomy in place and she had a TAVR. She had bilateral shoulder prosthesis.    Remains on:    1)	NS @ 50 ml/hr								2)	Lasix 40 mg via NGT q24 hours  3)	Lopressor 25 mg via NGT q12 hours								4)	Losartan 20 mg via NGT q24 hours								5)	Aspirin 81 mg po q24 hours								6)	Synthroid 25 micrograms via NGT q24 hours								7)	Zoloft 25 mg via NGT q24 hours								8)	Lamictal 100 mg via NGT q24 hours								9)	Xanax 1 mg via NGT qhs								10)	Sinemet 25/100 via NGT qid								11)	Insulin  	a)	Lantus 30 units sq qAM  	b)	Sliding scale  12)	Tylenol 650 mg po q6 hours prn								13)	Percocet 1 - 2 tab po q4 hours prn								14)	Zofran prn								15)	Albuterol 2.5 mg via nebulizer q6 hours								16)	Heparin 5,000 units sq q12 hours								17)	NPO except medications with sips / chips.								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 She is stable. Plan to:    1)	Admit her to the SICU (done).							2)	Clarify medication orders. No need for parenteral fluid and Lasix concurrently. Will indicate reason for each medication and provide hold parameters for antihypertensive medications.							3)	Commence enteral feedings. Will commence tube feedings and plan for an oral diet in the near future.						4)	Change the aspirin and percocet to administration via the NGT, not orally. Will give oxycodone not percocet and will give as a solution / liquid.						5)	Change the heparin to every 8 hours as opposed to q12 hour dosing.						6)	Commence treatment with a statin. Has had a prior CVA (as evidenced by prior CT scan of her brain) and has coronary artery disease. Unclear as to why she was not being treated with a statin.						7)	Allow and assist her to be out of bed.					8)	Will obtain a physical therapy as well as a speech therapy consultation.					9)	Plan expeditious transfer to the otolaryngology floor.					10)	Full support in place.    Viktor Castro May 17th, 2017  10:40 PM    Day of Hospitalization  Day of Operation  Day of SICU Admission  Initial SICU Evaluation    I was asked to evaluate this patient, render a Surgical Critical Care Consultation and to provide ongoing care and monitoring for this patient. Her chart is reviewed and she is examined.    This patient is a 71-year-old obese, hypertensive, insulin requiring type 2 diabetic female who presented to the Otolaryngology service for elective surgery. Approximately one year ago she had been noted to have a mass on her tongue that intermittently bleed. A biopsy reportedly revealed squamous cell carcinoma prompting the current hospitalization. A PET scan, obtained on 5/15/17 revealed right axillary and retropectoral lymph nodes that were avid for FDG and she had activity in the left side of her tongue. There was no definite FDG avid cervical lymphadenopathy.    She has a medical history significant for having hypertension, hypothyroidism, chronic obstructive pulmonary disease, insulin requiring type 2 diabetes mellitus, obesity, and obstructive sleep apnea. 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 She has chronic diastolic heart failure and is known to have moderate pulmonary hypertension. She has had Parkinson's diease and has had TIA's and a right occipital infarction. She has anxiety and depression. She previously she had a detachment of her left retina and has had bilateral lens implants for cataracts. She had an bioprosthetic aortic valve replacement and she has had coronary artery stents inserted in 2013 (into a mid LAD lesion and a proximal and mid right coronary artery.) She has had a cholecystectomy, a hysterectomy, an appendectomy, and bilateral shoulder surgery for rotator cuff injuries. She has had a lumbar fusion on 5/18/10 at [facility]. On 11/22/14 she was evaluated in the ED at [facility] with complaints of having a cough and dyspnea. Because she had a systolic murmur, on 5/1/15 she was hospitalized on the Medical Service at [facility] for a Trans-esophageal echocardiogram. She was found to have aortic stenosis and was hospitalized on the Cardiac Surgery Surgery Service at [facility] from 6/10/15 - 6/12/15 for placement of a TAVR. She had a 23 mm Bliss Rafia valve inserted. She was again evaluated in the ED at [facility] on 10/19/15 when she had complaints of having dyspnea. Apparently the patient left the ED prior to being evaluated. She was hospitalized on the medical service at [facility] from 4/2/16 - 4/6/16 for evaluation and treatment of weakness and loose bowel movements. While hospitalized her diarrhea resolved and she was discharged to home. She was hospitalized on the Medical Service at [facility] from 2/11/17 - 2/15/17 for evaluation and treatment of syncope. She had a "slight troponin leak" and was treated medically. An EEG failed to reveal evidence of the presence of seizures. A transthoracic echocardiogram, obtained on 2/14/17 revealed a global left ventricular ejection fraction of 60% - 65% with grade II diastolic dysfunction. The right ventricular systolic pressures were estimated to be 52 mm Hg, consistent with moderate pulmonary hypertension. Prior to the current hospitalization she took:    1)	Aspirin		2)	Plavix		3)	Lopressor	4)	Losartan	5)	Lasix		6)	Glucophage	7)	Levemir		8)	Synthroid	9)	Sinemet	10)	Neupro		11)	Albuterol	12)	Xanax		13)	Zoloft		14)	Lamotrigine	15)	Motrin		16)	Gabapentin	17)	Vitamin D	18)	Potassium  	  She uses BIPAP nocturnally. She is reportedly allergic to penicillin that causes angioedema and ultram that results in a rash. She is intolerant of morphine. She is reportedly allergic to latex. She does not abuse ethanol and she smoked 1 pack of cigarettes per day for 10 years stopping 30 years ago. Her family history is significant for her father having had cirrhosis. She is  and resides with her spouse, Carmelo France [phone] / [phone] in a private home in [place]. There are four stairs to traverse to enter the resident and there are 10 steps to the bedroom. The patient has an adult daughter, Amelie Elizabeth ([phone]). The patient's daughter and the patient's son-in-law reside upstairs in the same residence as the patient. The patient has previously assigned her spouse to be her healthcare agent / proxy although at other times the patient assigned her daughter to be her healthcare agent / proxy . She has decreased vision and uses corrective lenses. She commonly ambulates with the assistance of a rolling walker and / or a cane. Anesthetics have previously resulted in hallucinations. She denied having had headaches, dizziness, or photophobia. She has not had chest pain, or palpitations but she has dyspnea when climbing a flight of stairs. She denied having had abdominal pain, nausea or emesis, diarrhea or constipation, fever or chills. She has not been jaundiced nor has she had dark urine. She has had post-nasal drip and denied having had bleeding from any area other than her tongue. She has had joint pain, mostly in her back. She has depression and anxiety and denied having had suicidal ideation. Her 10-point review of systems was otherwise negative. Her internist is Dr. Orlin Lucero ([phone]). The patient's cardiologist is Dr. Engel ([phone]).    On presentation to PAT / PST she had a:    BP	=	120/60		P	=	82	R	=	18			Temp	=	37.4		O2 Sat	=	96%	VAS	=	-    She was awake, alert, and fully oriented. She was in no acute distress and did not appear toxic.  She was anicteric. She had reactive pupils and her extra-occular movements were preserved.  She did not have thrush. She had a mass on the left side of her tongue. She did not have JVD.  Her lungs were clear and she had non-labored respirations. She had symmetrical chest wall movements.  She had regular heart tones. She did not have a murmur.  Her abdomen was soft and neither distended nor tender. She was obese. She did not have guarding or rebound. There were no palpable masses or abdominal wall hernias. She had healed surgical scars. There was no CVA tenderness and she had active bowel sounds.  Her extremities were well perfused. She did not have a rash.  She had a healed posterior lumbar surgical scar. She had normal strength. Her musculoskeletal exam was otherwise normal.    Laboratory tests revealed a:    WBC	=	6	Na		=	142	Bilirubin		=	0.9  Neutro	=	-	K		=	3.4	Alk Phos	=	113  Hgb	=	9	Cl		=	104	SGOT		=	22  Hct	=	32%	HCO3		=	21	SGPT		=	6  Plts	=	252	Glucose	=	207  			BUN		=	14	Hemoglobin A1-C	=	7.4  PT	=	-	Creat		=	0.5  PTT	=	-  INR	=	-	Albumin	=	4.0    An EKG revealed NSR. She had inverted "T" waves in lead aVR and aVL.    She was electively admitted to the Otolaryngology service and she underwent anesthesia and surgery commencing at approximately 12:30 PM on 5/17/17. She was ASA 3 and underwent general anesthesia via a 7.0 naso-tracheal tube that was easy to insert. Attempts were made to place a radial arterial cannula. A 16 Fr Lange catheter was inserted. During the approximately 6 hours of anesthesia she was given 2,500 ml of crystalloid and one unit (300 ml) of pRBC. She produced 40 ml of urine and was estimated to have lost 100 ml of blood. During the anesthesia she required vasopressor support with ephedrine and norsynephrine. She was given Lasix. Laboratory tests, obtained at approximately 1 PM, during the anesthesia revealed a:    Na		=	139	Hct	=	29%  K		=	3.6  Glucose	=	114    Prior to commencing the operation she was given 600 mg of clindamycin. She underwent a left glossectomy, left neck dissection and tracheostomy. On completion of the anesthesia and surgery she was transferred to the PACU. During the approximately one hour she was in the PACU she had a:    BP	= 134 - 148/45 - 65	P	= 60 - 67		R	= 12 - 17  Temp	= 37			O2 Sat	= 98% - 100%    She was given aspirin, heparin sq, albuterol, IV fluid, xanax, zoloft, Lamictal, Sinemet, Lasix, Insulin, Lopressor, Losartan, Tylenol, Percocet, and Dilaudid. She was subsequently transferred to the SICU where she is now seen.    While in the SICU she has had a:    BP	=	113 - 152/44 - 65  P	=	60 - 80		O2 Sat	=	94% - 100%  R	=	12 - 19		I/O	=	300 in/440 out (5/17 - 5/18 < 24 hours)  Temp	=	36.4 - 37.2			CRISTIAN = 63 ml    Glucose		=	192	Weight		=	83.5 Kg  					BMI		=	38.5    Awake, alert, and interactive. Communicates effectively. In no distress. Anicteric. Pupils reactive with post-operative changes. Extra-occular movements intact.  Small bore NGT in place.  No thrush. Tongue with operative changes. No bleeding.  Tracheostomy in place with some leonie-tracheal bloody secretions. CRISTIAN sero-sanguinous.  Lungs with scattered rhonchi. Clear with coughing.  COR - RRR without a murmur.  Abdomen obese. Neither tender nor distended.  Extremities well perfused.    WBC	=	13  Neutro	=	-  Hgb	=	11  Hct	=	37%  Plts	=	259    A chest radiograph revealed the presence of a small bore NGT that ended below the diaphragm. She had a tracheostomy in place and she had a TAVR. She had bilateral shoulder prosthesis.    Remains on:    1)	NS @ 50 ml/hr										              2)	Lasix 40 mg via NGT q24 hours  3)	Lopressor 25 mg via NGT q12 hours							4)	Losartan 20 mg via NGT q24 hours							5)	Aspirin 81 mg po q24 hours								6)	Synthroid 25 micrograms via NGT q24 hours						7)	Zoloft 25 mg via NGT q24 hours								8)	Lamictal 100 mg via NGT q24 hours							9)	Xanax 1 mg via NGT qhs								10)	Sinemet 25/100 via NGT qid								11)	Insulin  	a)	Lantus 30 units sq qAM  	b)	Sliding scale  12)	Tylenol 650 mg po q6 hours prn								13)	Percocet 1 - 2 tab po q4 hours prn							14)	Zofran prn										15)	Albuterol 2.5 mg via nebulizer q6 hours							16)	Heparin 5,000 units sq q12 hours							17)	NPO except medications with sips / chips.						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 She is stable. Plan to:    1)	Admit her to the SICU (done).								2)	Clarify medication orders. No need for parenteral fluid and Lasix concurrently. Will indicate reason for each medication and provide hold parameters for antihypertensive medications.											3)	Commence enteral feedings. Will commence tube feedings and plan for an oral diet in the near future.											4)	Change the aspirin and percocet to administration via the NGT, not orally. Will give oxycodone not percocet and will give as a solution / liquid.					5)	Change the heparin to every 8 hours as opposed to q12 hour dosing.			6)	Commence treatment with a statin. Has had a prior CVA (as evidenced by prior CT scan of her brain) and has coronary artery disease. Unclear as to why she was not being treated with a statin.												7)	Allow and assist her to be out of bed.							8)	Will obtain a physical therapy as well as a speech therapy consultation.			9)	Plan expeditious transfer to the otolaryngology floor.					10)	Full support in place.    Viktor Castro May 17th, 2017  10:40 PM    Day of Hospitalization  Day of Operation  Day of SICU Admission  Initial SICU Evaluation    I was asked to evaluate this patient, render a Surgical Critical Care Consultation and to provide ongoing care and monitoring for this patient. Her chart is reviewed and she is examined.    This patient is a 71-year-old obese, hypertensive, insulin requiring type 2 diabetic female who presented to the Otolaryngology service for elective surgery. Approximately one year ago she had been noted to have a mass on her tongue that intermittently bleed. A biopsy reportedly revealed squamous cell carcinoma prompting the current hospitalization. A PET scan, obtained on 5/15/17 revealed right axillary and retropectoral lymph nodes that were avid for FDG and she had activity in the left side of her tongue. There was no definite FDG avid cervical lymphadenopathy.    She has a medical history significant for having hypertension, hypothyroidism, chronic obstructive pulmonary disease, insulin requiring type 2 diabetes mellitus, obesity, and obstructive sleep apnea. 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 She has chronic diastolic heart failure and is known to have moderate pulmonary hypertension. She has had Parkinson's diease and has had TIA's and a right occipital infarction. She has anxiety and depression. She previously she had a detachment of her left retina and has had bilateral lens implants for cataracts. She had an bioprosthetic aortic valve replacement and she has had coronary artery stents inserted in 2013 (into a mid LAD lesion and a proximal and mid right coronary artery.) She has had a cholecystectomy, a hysterectomy, an appendectomy, and bilateral shoulder surgery for rotator cuff injuries. She has had a lumbar fusion on 5/18/10 at [facility]. On 11/22/14 she was evaluated in the ED at [facility] with complaints of having a cough and dyspnea. Because she had a systolic murmur, on 5/1/15 she was hospitalized on the Medical Service at [facility] for a Trans-esophageal echocardiogram. She was found to have aortic stenosis and was hospitalized on the Cardiac Surgery Surgery Service at [facility] from 6/10/15 - 6/12/15 for placement of a TAVR. She had a 23 mm Bliss Rafia valve inserted. She was again evaluated in the ED at [facility] on 10/19/15 when she had complaints of having dyspnea. Apparently the patient left the ED prior to being evaluated. She was hospitalized on the medical service at [facility] from 4/2/16 - 4/6/16 for evaluation and treatment of weakness and loose bowel movements. While hospitalized her diarrhea resolved and she was discharged to home. She was hospitalized on the Medical Service at [facility] from 2/11/17 - 2/15/17 for evaluation and treatment of syncope. She had a "slight troponin leak" and was treated medically. An EEG failed to reveal evidence of the presence of seizures. A transthoracic echocardiogram, obtained on 2/14/17 revealed a global left ventricular ejection fraction of 60% - 65% with grade II diastolic dysfunction. The right ventricular systolic pressures were estimated to be 52 mm Hg, consistent with moderate pulmonary hypertension. Prior to the current hospitalization she took:    1)	Aspirin		2)	Plavix		3)	Lopressor	4)	Losartan5)	Lasix		6)	Glucophage	7)	Levemir		8)	Synthroid	9)	Sinemet	10)	Neupro		11)	Albuterol	12)	Xanax		13)	Zoloft		14)	Lamotrigine	15)	Motrin		16)	Gabapentin	17)	Vitamin D	18)	Potassium  	  She uses BIPAP nocturnally. She is reportedly allergic to penicillin that causes angioedema and ultram that results in a rash. She is intolerant of morphine. She is reportedly allergic to latex. She does not abuse ethanol and she smoked 1 pack of cigarettes per day for 10 years stopping 30 years ago. Her family history is significant for her father having had cirrhosis. She is  and resides with her spouse, Carmelo France [phone] / [phone] in a private home in [place]. There are four stairs to traverse to enter the resident and there are 10 steps to the bedroom. The patient has an adult daughter, Amelie Elizabeth ([phone]). The patient's daughter and the patient's son-in-law reside upstairs in the same residence as the patient. The patient has previously assigned her spouse to be her healthcare agent / proxy although at other times the patient assigned her daughter to be her healthcare agent / proxy . She has decreased vision and uses corrective lenses. She commonly ambulates with the assistance of a rolling walker and / or a cane. Anesthetics have previously resulted in hallucinations. She denied having had headaches, dizziness, or photophobia. She has not had chest pain, or palpitations but she has dyspnea when climbing a flight of stairs. She denied having had abdominal pain, nausea or emesis, diarrhea or constipation, fever or chills. She has not been jaundiced nor has she had dark urine. She has had post-nasal drip and denied having had bleeding from any area other than her tongue. She has had joint pain, mostly in her back. She has depression and anxiety and denied having had suicidal ideation. Her 10-point review of systems was otherwise negative. Her internist is Dr. Orlin Lucero ([phone]). The patient's cardiologist is Dr. Engel ([phone]).    On presentation to PAT / PST she had a:    BP	=	120/60		P	=	82	R	=	18			Temp	=	37.4		O2 Sat	=	96%	VAS	=	-    She was awake, alert, and fully oriented. She was in no acute distress and did not appear toxic.  She was anicteric. She had reactive pupils and her extra-occular movements were preserved.  She did not have thrush. She had a mass on the left side of her tongue. She did not have JVD.  Her lungs were clear and she had non-labored respirations. She had symmetrical chest wall movements.  She had regular heart tones. She did not have a murmur.  Her abdomen was soft and neither distended nor tender. She was obese. She did not have guarding or rebound. There were no palpable masses or abdominal wall hernias. She had healed surgical scars. There was no CVA tenderness and she had active bowel sounds.  Her extremities were well perfused. She did not have a rash.  She had a healed posterior lumbar surgical scar. She had normal strength. Her musculoskeletal exam was otherwise normal.    Laboratory tests revealed a:    WBC	=	6	Na		=	142	Bilirubin		=	0.9  Neutro	=	-	K		=	3.4	Alk Phos	=	113  Hgb	=	9	Cl		=	104	SGOT		=	22  Hct	=	32%	HCO3		=	21	SGPT		=	6  Plts	=	252	Glucose	=	207  			BUN		=	14	Hemoglobin A1-C	=	7.4  PT	=	-	Creat		=	0.5  PTT	=	-  INR	=	-	Albumin	=	4.0    An EKG revealed NSR. She had inverted "T" waves in lead aVR and aVL.    She was electively admitted to the Otolaryngology service and she underwent anesthesia and surgery commencing at approximately 12:30 PM on 5/17/17. She was ASA 3 and underwent general anesthesia via a 7.0 naso-tracheal tube that was easy to insert. Attempts were made to place a radial arterial cannula. A 16 Fr Lange catheter was inserted. During the approximately 6 hours of anesthesia she was given 2,500 ml of crystalloid and one unit (300 ml) of pRBC. She produced 40 ml of urine and was estimated to have lost 100 ml of blood. During the anesthesia she required vasopressor support with ephedrine and norsynephrine. She was given Lasix. Laboratory tests, obtained at approximately 1 PM, during the anesthesia revealed a:    Na		=	139	Hct	=	29%  K		=	3.6  Glucose	=	114    Prior to commencing the operation she was given 600 mg of clindamycin. She underwent a left glossectomy, left neck dissection and tracheostomy. On completion of the anesthesia and surgery she was transferred to the PACU. During the approximately one hour she was in the PACU she had a:    BP	= 134 - 148/45 - 65	P	= 60 - 67		R	= 12 - 17  Temp	= 37			O2 Sat	= 98% - 100%    She was given aspirin, heparin sq, albuterol, IV fluid, xanax, zoloft, Lamictal, Sinemet, Lasix, Insulin, Lopressor, Losartan, Tylenol, Percocet, and Dilaudid. She was subsequently transferred to the SICU where she is now seen.    While in the SICU she has had a:    BP	=	113 - 152/44 - 65  P	=	60 - 80		O2 Sat	=	94% - 100%  R	=	12 - 19		I/O	=	300 in/440 out (5/17 - 5/18 < 24 hours)  Temp	=	36.4 - 37.2			CRISTIAN = 63 ml    Glucose		=	192	Weight		=	83.5 Kg  					BMI		=	38.5    Awake, alert, and interactive. Communicates effectively. In no distress. Anicteric. Pupils reactive with post-operative changes. Extra-occular movements intact.  Small bore NGT in place.  No thrush. Tongue with operative changes. No bleeding.  Tracheostomy in place with some leonie-tracheal bloody secretions. CRISTIAN sero-sanguinous.  Lungs with scattered rhonchi. Clear with coughing.  COR - RRR without a murmur.  Abdomen obese. Neither tender nor distended.  Extremities well perfused.    WBC	=	13  Neutro	=	-  Hgb	=	11  Hct	=	37%  Plts	=	259    A chest radiograph revealed the presence of a small bore NGT that ended below the diaphragm. She had a tracheostomy in place and she had a TAVR. She had bilateral shoulder prosthesis.    Remains on:    1)	NS @ 50 ml/hr										              2)	Lasix 40 mg via NGT q24 hours  3)	Lopressor 25 mg via NGT q12 hours						4)	Losartan 20 mg via NGT q24 hours							5)	Aspirin 81 mg po q24 hours								6)	Synthroid 25 micrograms via NGT q24 hours						7)	Zoloft 25 mg via NGT q24 hours								8)	Lamictal 100 mg via NGT q24 hours							9)	Xanax 1 mg via NGT qhs								10)	Sinemet 25/100 via NGT qid								11)	Insulin  	a)	Lantus 30 units sq qAM  	b)	Sliding scale  12)	Tylenol 650 mg po q6 hours prn								13)	Percocet 1 - 2 tab po q4 hours prn							14)	Zofran prn										15)	Albuterol 2.5 mg via nebulizer q6 hours							16)	Heparin 5,000 units sq q12 hours							17)	NPO except medications with sips / chips.						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 She is stable. Plan to:    1)	Admit her to the SICU (done).								2)	Clarify medication orders. No need for parenteral fluid and Lasix concurrently. Will indicate reason for each medication and provide hold parameters for antihypertensive medications.											3)	Commence enteral feedings. Will commence tube feedings and plan for an oral diet in the near future.											4)	Change the aspirin and percocet to administration via the NGT, not orally. Will give oxycodone not percocet and will give as a solution / liquid.					5)	Change the heparin to every 8 hours as opposed to q12 hour dosing.			6)	Commence treatment with a statin. Has had a prior CVA (as evidenced by prior CT scan of her brain) and has coronary artery disease. Unclear as to why she was not being treated with a statin.												7)	Allow and assist her to be out of bed.							8)	Will obtain a physical therapy as well as a speech therapy consultation.			9)	Plan expeditious transfer to the otolaryngology floor.					10)	Full support in place.    Viktor Castro May 17th, 2017  10:40 PM    Day of Hospitalization  Day of Operation  Day of SICU Admission  Initial SICU Evaluation    I was asked to evaluate this patient, render a Surgical Critical Care Consultation and to provide ongoing care and monitoring for this patient. Her chart is reviewed and she is examined.    This patient is a 71-year-old obese, hypertensive, insulin requiring type 2 diabetic female who presented to the Otolaryngology service for elective surgery. Approximately one year ago she had been noted to have a mass on her tongue that intermittently bleed. A biopsy reportedly revealed squamous cell carcinoma prompting the current hospitalization. A PET scan, obtained on 5/15/17 revealed right axillary and retropectoral lymph nodes that were avid for FDG and she had activity in the left side of her tongue. There was no definite FDG avid cervical lymphadenopathy.    She has a medical history significant for having hypertension, hypothyroidism, chronic obstructive pulmonary disease, insulin requiring type 2 diabetes mellitus, obesity, and obstructive sleep apnea. 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 She has chronic diastolic heart failure and is known to have moderate pulmonary hypertension. She has had Parkinson's diease and has had TIA's and a right occipital infarction. She has anxiety and depression. She previously she had a detachment of her left retina and has had bilateral lens implants for cataracts. She had an bioprosthetic aortic valve replacement and she has had coronary artery stents inserted in 2013 (into a mid LAD lesion and a proximal and mid right coronary artery.) She has had a cholecystectomy, a hysterectomy, an appendectomy, and bilateral shoulder surgery for rotator cuff injuries. She has had a lumbar fusion on 5/18/10 at [facility]. On 11/22/14 she was evaluated in the ED at [facility] with complaints of having a cough and dyspnea. Because she had a systolic murmur, on 5/1/15 she was hospitalized on the Medical Service at [facility] for a Trans-esophageal echocardiogram. She was found to have aortic stenosis and was hospitalized on the Cardiac Surgery Surgery Service at [facility] from 6/10/15 - 6/12/15 for placement of a TAVR. She had a 23 mm Bliss Rafia valve inserted. She was again evaluated in the ED at [facility] on 10/19/15 when she had complaints of having dyspnea. Apparently the patient left the ED prior to being evaluated. She was hospitalized on the medical service at [facility] from 4/2/16 - 4/6/16 for evaluation and treatment of weakness and loose bowel movements. While hospitalized her diarrhea resolved and she was discharged to home. She was hospitalized on the Medical Service at [facility] from 2/11/17 - 2/15/17 for evaluation and treatment of syncope. She had a "slight troponin leak" and was treated medically. An EEG failed to reveal evidence of the presence of seizures. A transthoracic echocardiogram, obtained on 2/14/17 revealed a global left ventricular ejection fraction of 60% - 65% with grade II diastolic dysfunction. The right ventricular systolic pressures were estimated to be 52 mm Hg, consistent with moderate pulmonary hypertension. Prior to the current hospitalization she took:    1)	Aspirin		2)	Plavix		3)	Lopressor	4)	Losartan  5)	Lasix		6)	Glucophage	7)	Levemir		8)	Synthroid	9)	Sinemet	10)	Neupro		11)	Albuterol	12)	Xanax		13)	Zoloft		14)	Lamotrigine	15)	Motrin		16)	Gabapentin	17)	Vitamin D	18)	Potassium  	  She uses BIPAP nocturnally. She is reportedly allergic to penicillin that causes angioedema and ultram that results in a rash. She is intolerant of morphine. She is reportedly allergic to latex. She does not abuse ethanol and she smoked 1 pack of cigarettes per day for 10 years stopping 30 years ago. Her family history is significant for her father having had cirrhosis. She is  and resides with her spouse, Carmelo France [phone] / [phone] in a private home in [place]. There are four stairs to traverse to enter the resident and there are 10 steps to the bedroom. The patient has an adult daughter, Amelie Elizabeth ([phone]). The patient's daughter and the patient's son-in-law reside upstairs in the same residence as the patient. The patient has previously assigned her spouse to be her healthcare agent / proxy although at other times the patient assigned her daughter to be her healthcare agent / proxy . She has decreased vision and uses corrective lenses. She commonly ambulates with the assistance of a rolling walker and / or a cane. Anesthetics have previously resulted in hallucinations. She denied having had headaches, dizziness, or photophobia. She has not had chest pain, or palpitations but she has dyspnea when climbing a flight of stairs. She denied having had abdominal pain, nausea or emesis, diarrhea or constipation, fever or chills. She has not been jaundiced nor has she had dark urine. She has had post-nasal drip and denied having had bleeding from any area other than her tongue. She has had joint pain, mostly in her back. She has depression and anxiety and denied having had suicidal ideation. Her 10-point review of systems was otherwise negative. Her internist is Dr. Orlin Lucero ([phone]). The patient's cardiologist is Dr. Engel ([phone]).    On presentation to PAT / PST she had a:    BP	=	120/60		P	=	82	R	=	18			Temp	=	37.4		O2 Sat	=	96%	VAS	=	-    She was awake, alert, and fully oriented. She was in no acute distress and did not appear toxic.  She was anicteric. She had reactive pupils and her extra-occular movements were preserved.  She did not have thrush. She had a mass on the left side of her tongue. She did not have JVD.  Her lungs were clear and she had non-labored respirations. She had symmetrical chest wall movements.  She had regular heart tones. She did not have a murmur.  Her abdomen was soft and neither distended nor tender. She was obese. She did not have guarding or rebound. There were no palpable masses or abdominal wall hernias. She had healed surgical scars. There was no CVA tenderness and she had active bowel sounds.  Her extremities were well perfused. She did not have a rash.  She had a healed posterior lumbar surgical scar. She had normal strength. Her musculoskeletal exam was otherwise normal.    Laboratory tests revealed a:    WBC	=	6	Na		=	142	Bilirubin		=	0.9  Neutro	=	-	K		=	3.4	Alk Phos	=	113  Hgb	=	9	Cl		=	104	SGOT		=	22  Hct	=	32%	HCO3		=	21	SGPT		=	6  Plts	=	252	Glucose	=	207  			BUN		=	14	Hemoglobin A1-C	=	7.4  PT	=	-	Creat		=	0.5  PTT	=	-  INR	=	-	Albumin	=	4.0    An EKG revealed NSR. She had inverted "T" waves in lead aVR and aVL.    She was electively admitted to the Otolaryngology service and she underwent anesthesia and surgery commencing at approximately 12:30 PM on 5/17/17. She was ASA 3 and underwent general anesthesia via a 7.0 naso-tracheal tube that was easy to insert. Attempts were made to place a radial arterial cannula. A 16 Fr Lange catheter was inserted. During the approximately 6 hours of anesthesia she was given 2,500 ml of crystalloid and one unit (300 ml) of pRBC. She produced 40 ml of urine and was estimated to have lost 100 ml of blood. During the anesthesia she required vasopressor support with ephedrine and norsynephrine. She was given Lasix. Laboratory tests, obtained at approximately 1 PM, during the anesthesia revealed a:    Na		=	139	Hct	=	29%  K		=	3.6  Glucose	=	114    Prior to commencing the operation she was given 600 mg of clindamycin. She underwent a left glossectomy, left neck dissection and tracheostomy. On completion of the anesthesia and surgery she was transferred to the PACU. During the approximately one hour she was in the PACU she had a:    BP	= 134 - 148/45 - 65	P	= 60 - 67		R	= 12 - 17  Temp	= 37			O2 Sat	= 98% - 100%    She was given aspirin, heparin sq, albuterol, IV fluid, xanax, zoloft, Lamictal, Sinemet, Lasix, Insulin, Lopressor, Losartan, Tylenol, Percocet, and Dilaudid. She was subsequently transferred to the SICU where she is now seen.    While in the SICU she has had a:    BP	=	113 - 152/44 - 65  P	=	60 - 80		O2 Sat	=	94% - 100%  R	=	12 - 19		I/O	=	300 in/440 out (5/17 - 5/18 < 24 hours)  Temp	=	36.4 - 37.2			CRISTIAN = 63 ml    Glucose		=	192	Weight		=	83.5 Kg  					BMI		=	38.5    Awake, alert, and interactive. Communicates effectively. In no distress. Anicteric. Pupils reactive with post-operative changes. Extra-occular movements intact.  Small bore NGT in place.  No thrush. Tongue with operative changes. No bleeding.  Tracheostomy in place with some leonie-tracheal bloody secretions. CRISTIAN sero-sanguinous.  Lungs with scattered rhonchi. Clear with coughing.  COR - RRR without a murmur.  Abdomen obese. Neither tender nor distended.  Extremities well perfused.    WBC	=	13  Neutro	=	-  Hgb	=	11  Hct	=	37%  Plts	=	259    A chest radiograph revealed the presence of a small bore NGT that ended below the diaphragm. She had a tracheostomy in place and she had a TAVR. She had bilateral shoulder prosthesis.    Remains on:    1)	NS @ 50 ml/hr										              2)	Lasix 40 mg via NGT q24 hours  3)	Lopressor 25 mg via NGT q12 hours					              4)	Losartan 20 mg via NGT q24 hours							5)	Aspirin 81 mg po q24 hours								6)	Synthroid 25 micrograms via NGT q24 hours						7)	Zoloft 25 mg via NGT q24 hours								8)	Lamictal 100 mg via NGT q24 hours							9)	Xanax 1 mg via NGT qhs								10)	Sinemet 25/100 via NGT qid								11)	Insulin  	a)	Lantus 30 units sq qAM  	b)	Sliding scale  12)	Tylenol 650 mg po q6 hours prn								13)	Percocet 1 - 2 tab po q4 hours prn							14)	Zofran prn										15)	Albuterol 2.5 mg via nebulizer q6 hours							16)	Heparin 5,000 units sq q12 hours							17)	NPO except medications with sips / chips.						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 She is stable. Plan to:    1)	Admit her to the SICU (done).								2)	Clarify medication orders. No need for parenteral fluid and Lasix concurrently. Will indicate reason for each medication and provide hold parameters for antihypertensive medications.											3)	Commence enteral feedings. Will commence tube feedings and plan for an oral diet in the near future.											4)	Change the aspirin and percocet to administration via the NGT, not orally. Will give oxycodone not percocet and will give as a solution / liquid.					5)	Change the heparin to every 8 hours as opposed to q12 hour dosing.			6)	Commence treatment with a statin. Has had a prior CVA (as evidenced by prior CT scan of her brain) and has coronary artery disease. Unclear as to why she was not being treated with a statin.												7)	Allow and assist her to be out of bed.							8)	Will obtain a physical therapy as well as a speech therapy consultation.			9)	Plan expeditious transfer to the otolaryngology floor.					10)	Full support in place.    Viktor Castro May 17th, 2017  10:40 PM    Day of Hospitalization  Day of Operation  Day of SICU Admission  Initial SICU Evaluation    I was asked to evaluate this patient, render a Surgical Critical Care Consultation and to provide ongoing care and monitoring for this patient. Her chart is reviewed and she is examined.    This patient is a 71-year-old obese, hypertensive, insulin requiring type 2 diabetic female who presented to the Otolaryngology service for elective surgery. Approximately one year ago she had been noted to have a mass on her tongue that intermittently bleed. A biopsy reportedly revealed squamous cell carcinoma prompting the current hospitalization. A PET scan, obtained on 5/15/17 revealed right axillary and retropectoral lymph nodes that were avid for FDG and she had activity in the left side of her tongue. There was no definite FDG avid cervical lymphadenopathy.    She has a medical history significant for having hypertension, hypothyroidism, chronic obstructive pulmonary disease, insulin requiring type 2 diabetes mellitus, obesity, and obstructive sleep apnea. 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 She has chronic diastolic heart failure and is known to have moderate pulmonary hypertension. She has had Parkinson's diease and has had TIA's and a right occipital infarction. She has anxiety and depression. She previously she had a detachment of her left retina and has had bilateral lens implants for cataracts. She had an bioprosthetic aortic valve replacement and she has had coronary artery stents inserted in 2013 (into a mid LAD lesion and a proximal and mid right coronary artery.) She has had a cholecystectomy, a hysterectomy, an appendectomy, and bilateral shoulder surgery for rotator cuff injuries. She has had a lumbar fusion on 5/18/10 at [facility]. On 11/22/14 she was evaluated in the ED at [facility] with complaints of having a cough and dyspnea. Because she had a systolic murmur, on 5/1/15 she was hospitalized on the Medical Service at [facility] for a Trans-esophageal echocardiogram. She was found to have aortic stenosis and was hospitalized on the Cardiac Surgery Surgery Service at [facility] from 6/10/15 - 6/12/15 for placement of a TAVR. She had a 23 mm Bliss Rafia valve inserted. She was again evaluated in the ED at [facility] on 10/19/15 when she had complaints of having dyspnea. Apparently the patient left the ED prior to being evaluated. She was hospitalized on the medical service at [facility] from 4/2/16 - 4/6/16 for evaluation and treatment of weakness and loose bowel movements. While hospitalized her diarrhea resolved and she was discharged to home. She was hospitalized on the Medical Service at [facility] from 2/11/17 - 2/15/17 for evaluation and treatment of syncope. She had a "slight troponin leak" and was treated medically. An EEG failed to reveal evidence of the presence of seizures. A transthoracic echocardiogram, obtained on 2/14/17 revealed a global left ventricular ejection fraction of 60% - 65% with grade II diastolic dysfunction. The right ventricular systolic pressures were estimated to be 52 mm Hg, consistent with moderate pulmonary hypertension. Prior to the current hospitalization she took:    1)	Aspirin		2)	Plavix		3)	Lopressor	4)	Losartan                5)	Lasix		6)	Glucophage	7)	Levemir		8)	Synthroid	9)	Sinemet	10)	Neupro		11)	Albuterol	12)	Xanax		13)	Zoloft		14)	Lamotrigine	15)	Motrin		16)	Gabapentin	17)	Vitamin D	18)	Potassium  	  She uses BIPAP nocturnally. She is reportedly allergic to penicillin that causes angioedema and ultram that results in a rash. She is intolerant of morphine. She is reportedly allergic to latex. She does not abuse ethanol and she smoked 1 pack of cigarettes per day for 10 years stopping 30 years ago. Her family history is significant for her father having had cirrhosis. She is  and resides with her spouse, Carmelo France [phone] / [phone] in a private home in [place]. There are four stairs to traverse to enter the resident and there are 10 steps to the bedroom. The patient has an adult daughter, Amelie Elizabeth ([phone]). The patient's daughter and the patient's son-in-law reside upstairs in the same residence as the patient. The patient has previously assigned her spouse to be her healthcare agent / proxy although at other times the patient assigned her daughter to be her healthcare agent / proxy . She has decreased vision and uses corrective lenses. She commonly ambulates with the assistance of a rolling walker and / or a cane. Anesthetics have previously resulted in hallucinations. She denied having had headaches, dizziness, or photophobia. She has not had chest pain, or palpitations but she has dyspnea when climbing a flight of stairs. She denied having had abdominal pain, nausea or emesis, diarrhea or constipation, fever or chills. She has not been jaundiced nor has she had dark urine. She has had post-nasal drip and denied having had bleeding from any area other than her tongue. She has had joint pain, mostly in her back. She has depression and anxiety and denied having had suicidal ideation. Her 10-point review of systems was otherwise negative. Her internist is Dr. Orlin Lucero ([phone]). The patient's cardiologist is Dr. Engel ([phone]).    On presentation to PAT / PST she had a:    BP	=	120/60		P	=	82	R	=	18			Temp	=	37.4		O2 Sat	=	96%	VAS	=	-    She was awake, alert, and fully oriented. She was in no acute distress and did not appear toxic.  She was anicteric. She had reactive pupils and her extra-occular movements were preserved.  She did not have thrush. She had a mass on the left side of her tongue. She did not have JVD.  Her lungs were clear and she had non-labored respirations. She had symmetrical chest wall movements.  She had regular heart tones. She did not have a murmur.  Her abdomen was soft and neither distended nor tender. She was obese. She did not have guarding or rebound. There were no palpable masses or abdominal wall hernias. She had healed surgical scars. There was no CVA tenderness and she had active bowel sounds.  Her extremities were well perfused. She did not have a rash.  She had a healed posterior lumbar surgical scar. She had normal strength. Her musculoskeletal exam was otherwise normal.    Laboratory tests revealed a:    WBC	=	6	Na		=	142	Bilirubin		=	0.9  Neutro	=	-	K		=	3.4	Alk Phos	=	113  Hgb	=	9	Cl		=	104	SGOT		=	22  Hct	=	32%	HCO3		=	21	SGPT		=	6  Plts	=	252	Glucose	=	207  			BUN		=	14	Hemoglobin A1-C	=	7.4  PT	=	-	Creat		=	0.5  PTT	=	-  INR	=	-	Albumin	=	4.0    An EKG revealed NSR. She had inverted "T" waves in lead aVR and aVL.    She was electively admitted to the Otolaryngology service and she underwent anesthesia and surgery commencing at approximately 12:30 PM on 5/17/17. She was ASA 3 and underwent general anesthesia via a 7.0 naso-tracheal tube that was easy to insert. Attempts were made to place a radial arterial cannula. A 16 Fr Lange catheter was inserted. During the approximately 6 hours of anesthesia she was given 2,500 ml of crystalloid and one unit (300 ml) of pRBC. She produced 40 ml of urine and was estimated to have lost 100 ml of blood. During the anesthesia she required vasopressor support with ephedrine and norsynephrine. She was given Lasix. Laboratory tests, obtained at approximately 1 PM, during the anesthesia revealed a:    Na		=	139	Hct	=	29%  K		=	3.6  Glucose	=	114    Prior to commencing the operation she was given 600 mg of clindamycin. She underwent a left glossectomy, left neck dissection and tracheostomy. On completion of the anesthesia and surgery she was transferred to the PACU. During the approximately one hour she was in the PACU she had a:    BP	= 134 - 148/45 - 65	P	= 60 - 67		R	= 12 - 17  Temp	= 37			O2 Sat	= 98% - 100%    She was given aspirin, heparin sq, albuterol, IV fluid, xanax, zoloft, Lamictal, Sinemet, Lasix, Insulin, Lopressor, Losartan, Tylenol, Percocet, and Dilaudid. She was subsequently transferred to the SICU where she is now seen.    While in the SICU she has had a:    BP	=	113 - 152/44 - 65  P	=	60 - 80		O2 Sat	=	94% - 100%  R	=	12 - 19		I/O	=	300 in/440 out (5/17 - 5/18 < 24 hours)  Temp	=	36.4 - 37.2			CRISTIAN = 63 ml    Glucose		=	192	Weight		=	83.5 Kg  					BMI		=	38.5    Awake, alert, and interactive. Communicates effectively. In no distress. Anicteric. Pupils reactive with post-operative changes. Extra-occular movements intact.  Small bore NGT in place.  No thrush. Tongue with operative changes. No bleeding.  Tracheostomy in place with some leonie-tracheal bloody secretions. CRISTIAN sero-sanguinous.  Lungs with scattered rhonchi. Clear with coughing.  COR - RRR without a murmur.  Abdomen obese. Neither tender nor distended.  Extremities well perfused.    WBC	=	13  Neutro	=	-  Hgb	=	11  Hct	=	37%  Plts	=	259    A chest radiograph revealed the presence of a small bore NGT that ended below the diaphragm. She had a tracheostomy in place and she had a TAVR. She had bilateral shoulder prosthesis.    Remains on:    1)	NS @ 50 ml/hr										              2)	Lasix 40 mg via NGT q24 hours  3)	Lopressor 25 mg via NGT q12 hours					              	4)	Losartan 20 mg via NGT q24 hours							5)	Aspirin 81 mg po q24 hours								6)	Synthroid 25 micrograms via NGT q24 hours						7)	Zoloft 25 mg via NGT q24 hours								8)	Lamictal 100 mg via NGT q24 hours							9)	Xanax 1 mg via NGT qhs								10)	Sinemet 25/100 via NGT qid								11)	Insulin  	a)	Lantus 30 units sq qAM  	b)	Sliding scale  12)	Tylenol 650 mg po q6 hours prn								13)	Percocet 1 - 2 tab po q4 hours prn							14)	Zofran prn										15)	Albuterol 2.5 mg via nebulizer q6 hours							16)	Heparin 5,000 units sq q12 hours							17)	NPO except medications with sips / chips.						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 She is stable. Plan to:    1)	Admit her to the SICU (done).								2)	Clarify medication orders. No need for parenteral fluid and Lasix concurrently. Will indicate reason for each medication and provide hold parameters for antihypertensive medications.											3)	Commence enteral feedings. Will commence tube feedings and plan for an oral diet in the near future.											4)	Change the aspirin and percocet to administration via the NGT, not orally. Will give oxycodone not percocet and will give as a solution / liquid.					5)	Change the heparin to every 8 hours as opposed to q12 hour dosing.			6)	Commence treatment with a statin. Has had a prior CVA (as evidenced by prior CT scan of her brain) and has coronary artery disease. Unclear as to why she was not being treated with a statin.												7)	Allow and assist her to be out of bed.							8)	Will obtain a physical therapy as well as a speech therapy consultation.			9)	Plan expeditious transfer to the otolaryngology floor.					10)	Full support in place.    Viktor Castro May 17th, 2017  10:40 PM    Day of Hospitalization  Day of Operation  Day of SICU Admission  Initial SICU Evaluation    I was asked to evaluate this patient, render a Surgical Critical Care Consultation and to provide ongoing care and monitoring for this patient. Her chart is reviewed and she is examined.    This patient is a 71-year-old obese, hypertensive, insulin requiring type 2 diabetic female who presented to the Otolaryngology service for elective surgery. Approximately one year ago she had been noted to have a mass on her tongue that intermittently bleed. A biopsy reportedly revealed squamous cell carcinoma prompting the current hospitalization. A PET scan, obtained on 5/15/17 revealed right axillary and retropectoral lymph nodes that were avid for FDG and she had activity in the left side of her tongue. There was no definite FDG avid cervical lymphadenopathy.    She has a medical history significant for having hypertension, hypothyroidism, chronic obstructive pulmonary disease, insulin requiring type 2 diabetes mellitus, obesity, and obstructive sleep apnea. 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 She has chronic diastolic heart failure and is known to have moderate pulmonary hypertension. She has had Parkinson's diease and has had TIA's and a right occipital infarction. She has anxiety and depression. She previously she had a detachment of her left retina and has had bilateral lens implants for cataracts. She had an bioprosthetic aortic valve replacement and she has had coronary artery stents inserted in 2013 (into a mid LAD lesion and a proximal and mid right coronary artery.) She has had a cholecystectomy, a hysterectomy, an appendectomy, and bilateral shoulder surgery for rotator cuff injuries. She has had a lumbar fusion on 5/18/10 at [facility]. On 11/22/14 she was evaluated in the ED at [facility] with complaints of having a cough and dyspnea. Because she had a systolic murmur, on 5/1/15 she was hospitalized on the Medical Service at [facility] for a Trans-esophageal echocardiogram. She was found to have aortic stenosis and was hospitalized on the Cardiac Surgery Surgery Service at [facility] from 6/10/15 - 6/12/15 for placement of a TAVR. She had a 23 mm Bliss Rafia valve inserted. She was again evaluated in the ED at [facility] on 10/19/15 when she had complaints of having dyspnea. Apparently the patient left the ED prior to being evaluated. She was hospitalized on the medical service at [facility] from 4/2/16 - 4/6/16 for evaluation and treatment of weakness and loose bowel movements. While hospitalized her diarrhea resolved and she was discharged to home. She was hospitalized on the Medical Service at [facility] from 2/11/17 - 2/15/17 for evaluation and treatment of syncope. She had a "slight troponin leak" and was treated medically. An EEG failed to reveal evidence of the presence of seizures. A transthoracic echocardiogram, obtained on 2/14/17 revealed a global left ventricular ejection fraction of 60% - 65% with grade II diastolic dysfunction. The right ventricular systolic pressures were estimated to be 52 mm Hg, consistent with moderate pulmonary hypertension. Prior to the current hospitalization she took:    1)	Aspirin		2)	Plavix		3)	Lopressor	4)	Losartan                5)	Lasix		6)	Glucophage	7)	Levemir		8)	Synthroid	              9)	Sinemet	10)	Neupro		11)	Albuterol	12)	Xanax		13)	Zoloft		14)	Lamotrigine	15)	Motrin		16)	Gabapentin	17)	Vitamin D	18)	Potassium  	  She uses BIPAP nocturnally. She is reportedly allergic to penicillin that causes angioedema and ultram that results in a rash. She is intolerant of morphine. She is reportedly allergic to latex. She does not abuse ethanol and she smoked 1 pack of cigarettes per day for 10 years stopping 30 years ago. Her family history is significant for her father having had cirrhosis. She is  and resides with her spouse, Carmelo France [phone] / [phone] in a private home in [place]. There are four stairs to traverse to enter the resident and there are 10 steps to the bedroom. The patient has an adult daughter, Amelie Elizabeth ([phone]). The patient's daughter and the patient's son-in-law reside upstairs in the same residence as the patient. The patient has previously assigned her spouse to be her healthcare agent / proxy although at other times the patient assigned her daughter to be her healthcare agent / proxy . She has decreased vision and uses corrective lenses. She commonly ambulates with the assistance of a rolling walker and / or a cane. Anesthetics have previously resulted in hallucinations. She denied having had headaches, dizziness, or photophobia. She has not had chest pain, or palpitations but she has dyspnea when climbing a flight of stairs. She denied having had abdominal pain, nausea or emesis, diarrhea or constipation, fever or chills. She has not been jaundiced nor has she had dark urine. She has had post-nasal drip and denied having had bleeding from any area other than her tongue. She has had joint pain, mostly in her back. She has depression and anxiety and denied having had suicidal ideation. Her 10-point review of systems was otherwise negative. Her internist is Dr. Orlin Lucero ([phone]). The patient's cardiologist is Dr. Engel ([phone]).    On presentation to PAT / PST she had a:    BP	=	120/60		P	=	82	R	=	18			Temp	=	37.4		O2 Sat	=	96%	VAS	=	-    She was awake, alert, and fully oriented. She was in no acute distress and did not appear toxic.  She was anicteric. She had reactive pupils and her extra-occular movements were preserved.  She did not have thrush. She had a mass on the left side of her tongue. She did not have JVD.  Her lungs were clear and she had non-labored respirations. She had symmetrical chest wall movements.  She had regular heart tones. She did not have a murmur.  Her abdomen was soft and neither distended nor tender. She was obese. She did not have guarding or rebound. There were no palpable masses or abdominal wall hernias. She had healed surgical scars. There was no CVA tenderness and she had active bowel sounds.  Her extremities were well perfused. She did not have a rash.  She had a healed posterior lumbar surgical scar. She had normal strength. Her musculoskeletal exam was otherwise normal.    Laboratory tests revealed a:    WBC	=	6	Na		=	142	Bilirubin		=	0.9  Neutro	=	-	K		=	3.4	Alk Phos	=	113  Hgb	=	9	Cl		=	104	SGOT		=	22  Hct	=	32%	HCO3		=	21	SGPT		=	6  Plts	=	252	Glucose	=	207  			BUN		=	14	Hemoglobin A1-C	=	7.4  PT	=	-	Creat		=	0.5  PTT	=	-  INR	=	-	Albumin	=	4.0    An EKG revealed NSR. She had inverted "T" waves in lead aVR and aVL.    She was electively admitted to the Otolaryngology service and she underwent anesthesia and surgery commencing at approximately 12:30 PM on 5/17/17. She was ASA 3 and underwent general anesthesia via a 7.0 naso-tracheal tube that was easy to insert. Attempts were made to place a radial arterial cannula. A 16 Fr Lange catheter was inserted. During the approximately 6 hours of anesthesia she was given 2,500 ml of crystalloid and one unit (300 ml) of pRBC. She produced 40 ml of urine and was estimated to have lost 100 ml of blood. During the anesthesia she required vasopressor support with ephedrine and norsynephrine. She was given Lasix. Laboratory tests, obtained at approximately 1 PM, during the anesthesia revealed a:    Na		=	139	Hct	=	29%  K		=	3.6  Glucose	=	114    Prior to commencing the operation she was given 600 mg of clindamycin. She underwent a left glossectomy, left neck dissection and tracheostomy. On completion of the anesthesia and surgery she was transferred to the PACU. During the approximately one hour she was in the PACU she had a:    BP	= 134 - 148/45 - 65	P	= 60 - 67		R	= 12 - 17  Temp	= 37			O2 Sat	= 98% - 100%    She was given aspirin, heparin sq, albuterol, IV fluid, xanax, zoloft, Lamictal, Sinemet, Lasix, Insulin, Lopressor, Losartan, Tylenol, Percocet, and Dilaudid. She was subsequently transferred to the SICU where she is now seen.    While in the SICU she has had a:    BP	=	113 - 152/44 - 65  P	=	60 - 80		O2 Sat	=	94% - 100%  R	=	12 - 19		I/O	=	300 in/440 out (5/17 - 5/18 < 24 hours)  Temp	=	36.4 - 37.2			CRISTIAN = 63 ml    Glucose		=	192	Weight		=	83.5 Kg  					BMI		=	38.5    Awake, alert, and interactive. Communicates effectively. In no distress. Anicteric. Pupils reactive with post-operative changes. Extra-occular movements intact.  Small bore NGT in place.  No thrush. Tongue with operative changes. No bleeding.  Tracheostomy in place with some leonie-tracheal bloody secretions. CRISTIAN sero-sanguinous.  Lungs with scattered rhonchi. Clear with coughing.  COR - RRR without a murmur.  Abdomen obese. Neither tender nor distended.  Extremities well perfused.    WBC	=	13  Neutro	=	-  Hgb	=	11  Hct	=	37%  Plts	=	259    A chest radiograph revealed the presence of a small bore NGT that ended below the diaphragm. She had a tracheostomy in place and she had a TAVR. She had bilateral shoulder prosthesis.    Remains on:    1)	NS @ 50 ml/hr										              2)	Lasix 40 mg via NGT q24 hours  3)	Lopressor 25 mg via NGT q12 hours					                            4)	Losartan 20 mg via NGT q24 hours							5)	Aspirin 81 mg po q24 hours								6)	Synthroid 25 micrograms via NGT q24 hours						7)	Zoloft 25 mg via NGT q24 hours								8)	Lamictal 100 mg via NGT q24 hours							9)	Xanax 1 mg via NGT qhs								10)	Sinemet 25/100 via NGT qid								11)	Insulin  	a)	Lantus 30 units sq qAM  	b)	Sliding scale  12)	Tylenol 650 mg po q6 hours prn								13)	Percocet 1 - 2 tab po q4 hours prn							14)	Zofran prn										15)	Albuterol 2.5 mg via nebulizer q6 hours							16)	Heparin 5,000 units sq q12 hours							17)	NPO except medications with sips / chips.						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 She is stable. Plan to:    1)	Admit her to the SICU (done).								2)	Clarify medication orders. No need for parenteral fluid and Lasix concurrently. Will indicate reason for each medication and provide hold parameters for antihypertensive medications.											3)	Commence enteral feedings. Will commence tube feedings and plan for an oral diet in the near future.											4)	Change the aspirin and percocet to administration via the NGT, not orally. Will give oxycodone not percocet and will give as a solution / liquid.					5)	Change the heparin to every 8 hours as opposed to q12 hour dosing.			6)	Commence treatment with a statin. Has had a prior CVA (as evidenced by prior CT scan of her brain) and has coronary artery disease. Unclear as to why she was not being treated with a statin.												7)	Allow and assist her to be out of bed.							8)	Will obtain a physical therapy as well as a speech therapy consultation.			9)	Plan expeditious transfer to the otolaryngology floor.					10)	Full support in place.    Viktor Castro May 17th, 2017  10:40 PM    Day of Hospitalization  Day of Operation  Day of SICU Admission  Initial SICU Evaluation    I was asked to evaluate this patient, render a Surgical Critical Care Consultation and to provide ongoing care and monitoring for this patient. Her chart is reviewed and she is examined.    This patient is a 71-year-old obese, hypertensive, insulin requiring type 2 diabetic female who presented to the Otolaryngology service for elective surgery. Approximately one year ago she had been noted to have a mass on her tongue that intermittently bleed. A biopsy reportedly revealed squamous cell carcinoma prompting the current hospitalization. A PET scan, obtained on 5/15/17 revealed right axillary and retropectoral lymph nodes that were avid for FDG and she had activity in the left side of her tongue. There was no definite FDG avid cervical lymphadenopathy.    She has a medical history significant for having hypertension, hypothyroidism, chronic obstructive pulmonary disease, insulin requiring type 2 diabetes mellitus, obesity, and obstructive sleep apnea. 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 She has chronic diastolic heart failure and is known to have moderate pulmonary hypertension. She has had Parkinson's diease and has had TIA's and a right occipital infarction. She has anxiety and depression. She previously she had a detachment of her left retina and has had bilateral lens implants for cataracts. She had an bioprosthetic aortic valve replacement and she has had coronary artery stents inserted in 2013 (into a mid LAD lesion and a proximal and mid right coronary artery.) She has had a cholecystectomy, a hysterectomy, an appendectomy, and bilateral shoulder surgery for rotator cuff injuries. She has had a lumbar fusion on 5/18/10 at [facility]. On 11/22/14 she was evaluated in the ED at [facility] with complaints of having a cough and dyspnea. Because she had a systolic murmur, on 5/1/15 she was hospitalized on the Medical Service at [facility] for a Trans-esophageal echocardiogram. She was found to have aortic stenosis and was hospitalized on the Cardiac Surgery Surgery Service at [facility] from 6/10/15 - 6/12/15 for placement of a TAVR. She had a 23 mm Bliss Rafia valve inserted. She was again evaluated in the ED at [facility] on 10/19/15 when she had complaints of having dyspnea. Apparently the patient left the ED prior to being evaluated. She was hospitalized on the medical service at [facility] from 4/2/16 - 4/6/16 for evaluation and treatment of weakness and loose bowel movements. While hospitalized her diarrhea resolved and she was discharged to home. She was hospitalized on the Medical Service at [facility] from 2/11/17 - 2/15/17 for evaluation and treatment of syncope. She had a "slight troponin leak" and was treated medically. An EEG failed to reveal evidence of the presence of seizures. A transthoracic echocardiogram, obtained on 2/14/17 revealed a global left ventricular ejection fraction of 60% - 65% with grade II diastolic dysfunction. The right ventricular systolic pressures were estimated to be 52 mm Hg, consistent with moderate pulmonary hypertension. Prior to the current hospitalization she took:    1)	Aspirin		2)	Plavix		3)	Lopressor	4)	Losartan                5)	Lasix		6)	Glucophage	7)	Levemir		8)	Synthroid	              9)	Sinemet	10)	Neupro		11)	Albuterol	12)	Xanax	13)	Zoloft		14)	Lamotrigine	15)	Motrin		16)	Gabapentin	17)	Vitamin D	18)	Potassium  	  She uses BIPAP nocturnally. She is reportedly allergic to penicillin that causes angioedema and ultram that results in a rash. She is intolerant of morphine. She is reportedly allergic to latex. She does not abuse ethanol and she smoked 1 pack of cigarettes per day for 10 years stopping 30 years ago. Her family history is significant for her father having had cirrhosis. She is  and resides with her spouse, Carmelo France [phone] / [phone] in a private home in [place]. There are four stairs to traverse to enter the resident and there are 10 steps to the bedroom. The patient has an adult daughter, Amelie Elizabeth ([phone]). The patient's daughter and the patient's son-in-law reside upstairs in the same residence as the patient. The patient has previously assigned her spouse to be her healthcare agent / proxy although at other times the patient assigned her daughter to be her healthcare agent / proxy . She has decreased vision and uses corrective lenses. She commonly ambulates with the assistance of a rolling walker and / or a cane. Anesthetics have previously resulted in hallucinations. She denied having had headaches, dizziness, or photophobia. She has not had chest pain, or palpitations but she has dyspnea when climbing a flight of stairs. She denied having had abdominal pain, nausea or emesis, diarrhea or constipation, fever or chills. She has not been jaundiced nor has she had dark urine. She has had post-nasal drip and denied having had bleeding from any area other than her tongue. She has had joint pain, mostly in her back. She has depression and anxiety and denied having had suicidal ideation. Her 10-point review of systems was otherwise negative. Her internist is Dr. Orlin Lucero ([phone]). The patient's cardiologist is Dr. Engel ([phone]).    On presentation to PAT / PST she had a:    BP	=	120/60		P	=	82	R	=	18			Temp	=	37.4		O2 Sat	=	96%	VAS	=	-    She was awake, alert, and fully oriented. She was in no acute distress and did not appear toxic.  She was anicteric. She had reactive pupils and her extra-occular movements were preserved.  She did not have thrush. She had a mass on the left side of her tongue. She did not have JVD.  Her lungs were clear and she had non-labored respirations. She had symmetrical chest wall movements.  She had regular heart tones. She did not have a murmur.  Her abdomen was soft and neither distended nor tender. She was obese. She did not have guarding or rebound. There were no palpable masses or abdominal wall hernias. She had healed surgical scars. There was no CVA tenderness and she had active bowel sounds.  Her extremities were well perfused. She did not have a rash.  She had a healed posterior lumbar surgical scar. She had normal strength. Her musculoskeletal exam was otherwise normal.    Laboratory tests revealed a:    WBC	=	6	Na		=	142	Bilirubin		=	0.9  Neutro	=	-	K		=	3.4	Alk Phos	=	113  Hgb	=	9	Cl		=	104	SGOT		=	22  Hct	=	32%	HCO3		=	21	SGPT		=	6  Plts	=	252	Glucose	=	207  			BUN		=	14	Hemoglobin A1-C	=	7.4  PT	=	-	Creat		=	0.5  PTT	=	-  INR	=	-	Albumin	=	4.0    An EKG revealed NSR. She had inverted "T" waves in lead aVR and aVL.    She was electively admitted to the Otolaryngology service and she underwent anesthesia and surgery commencing at approximately 12:30 PM on 5/17/17. She was ASA 3 and underwent general anesthesia via a 7.0 naso-tracheal tube that was easy to insert. Attempts were made to place a radial arterial cannula. A 16 Fr Lange catheter was inserted. During the approximately 6 hours of anesthesia she was given 2,500 ml of crystalloid and one unit (300 ml) of pRBC. She produced 40 ml of urine and was estimated to have lost 100 ml of blood. During the anesthesia she required vasopressor support with ephedrine and norsynephrine. She was given Lasix. Laboratory tests, obtained at approximately 1 PM, during the anesthesia revealed a:    Na		=	139	Hct	=	29%  K		=	3.6  Glucose	=	114    Prior to commencing the operation she was given 600 mg of clindamycin. She underwent a left glossectomy, left neck dissection and tracheostomy. On completion of the anesthesia and surgery she was transferred to the PACU. During the approximately one hour she was in the PACU she had a:    BP	= 134 - 148/45 - 65	P	= 60 - 67		R	= 12 - 17  Temp	= 37			O2 Sat	= 98% - 100%    She was given aspirin, heparin sq, albuterol, IV fluid, xanax, zoloft, Lamictal, Sinemet, Lasix, Insulin, Lopressor, Losartan, Tylenol, Percocet, and Dilaudid. She was subsequently transferred to the SICU where she is now seen.    While in the SICU she has had a:    BP	=	113 - 152/44 - 65  P	=	60 - 80		O2 Sat	=	94% - 100%  R	=	12 - 19		I/O	=	300 in/440 out (5/17 - 5/18 < 24 hours)  Temp	=	36.4 - 37.2			CRISTIAN = 63 ml    Glucose		=	192	Weight		=	83.5 Kg  					BMI		=	38.5    Awake, alert, and interactive. Communicates effectively. In no distress. Anicteric. Pupils reactive with post-operative changes. Extra-occular movements intact.  Small bore NGT in place.  No thrush. Tongue with operative changes. No bleeding.  Tracheostomy in place with some leonie-tracheal bloody secretions. CRISTIAN sero-sanguinous.  Lungs with scattered rhonchi. Clear with coughing.  COR - RRR without a murmur.  Abdomen obese. Neither tender nor distended.  Extremities well perfused.    WBC	=	13  Neutro	=	-  Hgb	=	11  Hct	=	37%  Plts	=	259    A chest radiograph revealed the presence of a small bore NGT that ended below the diaphragm. She had a tracheostomy in place and she had a TAVR. She had bilateral shoulder prosthesis.    Remains on:    1)	NS @ 50 ml/hr										              2)	Lasix 40 mg via NGT q24 hours  3)	Lopressor 25 mg via NGT q12 hours					                            4)	Losartan 20 mg via NGT q24 hours						5)	Aspirin 81 mg po q24 hours								6)	Synthroid 25 micrograms via NGT q24 hours						7)	Zoloft 25 mg via NGT q24 hours								8)	Lamictal 100 mg via NGT q24 hours							9)	Xanax 1 mg via NGT qhs								10)	Sinemet 25/100 via NGT qid								11)	Insulin  	a)	Lantus 30 units sq qAM  	b)	Sliding scale  12)	Tylenol 650 mg po q6 hours prn								13)	Percocet 1 - 2 tab po q4 hours prn							14)	Zofran prn										15)	Albuterol 2.5 mg via nebulizer q6 hours							16)	Heparin 5,000 units sq q12 hours							17)	NPO except medications with sips / chips.						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 She is stable. Plan to:    1)	Admit her to the SICU (done).								2)	Clarify medication orders. No need for parenteral fluid and Lasix concurrently. Will indicate reason for each medication and provide hold parameters for antihypertensive medications.											3)	Commence enteral feedings. Will commence tube feedings and plan for an oral diet in the near future.											4)	Change the aspirin and percocet to administration via the NGT, not orally. Will give oxycodone not percocet and will give as a solution / liquid.					5)	Change the heparin to every 8 hours as opposed to q12 hour dosing.			6)	Commence treatment with a statin. Has had a prior CVA (as evidenced by prior CT scan of her brain) and has coronary artery disease. Unclear as to why she was not being treated with a statin.												7)	Allow and assist her to be out of bed.							8)	Will obtain a physical therapy as well as a speech therapy consultation.			9)	Plan expeditious transfer to the otolaryngology floor.					10)	Full support in place.    Viktor Castro May 17th, 2017  10:40 PM    Day of Hospitalization  Day of Operation  Day of SICU Admission  Initial SICU Evaluation    I was asked to evaluate this patient, render a Surgical Critical Care Consultation and to provide ongoing care and monitoring for this patient. Her chart is reviewed and she is examined.    This patient is a 71-year-old obese, hypertensive, insulin requiring type 2 diabetic female who presented to the Otolaryngology service for elective surgery. Approximately one year ago she had been noted to have a mass on her tongue that intermittently bleed. A biopsy reportedly revealed squamous cell carcinoma prompting the current hospitalization. A PET scan, obtained on 5/15/17 revealed right axillary and retropectoral lymph nodes that were avid for FDG and she had activity in the left side of her tongue. There was no definite FDG avid cervical lymphadenopathy.    She has a medical history significant for having hypertension, hypothyroidism, chronic obstructive pulmonary disease, insulin requiring type 2 diabetes mellitus, obesity, and obstructive sleep apnea. 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 She has chronic diastolic heart failure and is known to have moderate pulmonary hypertension. She has had Parkinson's diease and has had TIA's and a right occipital infarction. She has anxiety and depression. She previously she had a detachment of her left retina and has had bilateral lens implants for cataracts. She had an bioprosthetic aortic valve replacement and she has had coronary artery stents inserted in 2013 (into a mid LAD lesion and a proximal and mid right coronary artery.) She has had a cholecystectomy, a hysterectomy, an appendectomy, and bilateral shoulder surgery for rotator cuff injuries. She has had a lumbar fusion on 5/18/10 at [facility] [place]. On 11/22/14 she was evaluated in the ED at [facility] [place] with complaints of having a cough and dyspnea. Because she had a systolic murmur, on 5/1/15 she was hospitalized on the Medical Service at [facility] [place] for a Trans-esophageal echocardiogram. She was found to have aortic stenosis and was hospitalized on the Cardiac Surgery Surgery Service at [facility] [place] from 6/10/15 - 6/12/15 for placement of a TAVR. She had a 23 mm Bliss Rafia valve inserted. She was again evaluated in the ED at [facility] [place] on 10/19/15 when she had complaints of having dyspnea. Apparently the patient left the ED prior to being evaluated. She was hospitalized on the medical service at [facility] [place] from 4/2/16 - 4/6/16 for evaluation and treatment of weakness and loose bowel movements. While hospitalized her diarrhea resolved and she was discharged to home. She was hospitalized on the Medical Service at [facility] from 2/11/17 - 2/15/17 for evaluation and treatment of syncope. She had a "slight troponin leak" and was treated medically. An EEG failed to reveal evidence of the presence of seizures. A transthoracic echocardiogram, obtained on 2/14/17 revealed a global left ventricular ejection fraction of 60% - 65% with grade II diastolic dysfunction. The right ventricular systolic pressures were estimated to be 52 mm Hg, consistent with moderate pulmonary hypertension. Prior to the current hospitalization she took:    1)	Aspirin		2)	Plavix		3)	Lopressor	4)	Losartan                5)	Lasix		6)	Glucophage	7)	Levemir		8)	Synthroid	              9)	Sinemet	10)	Neupro		11)	Albuterol	12)	Xanax      13)	Zoloft		14)	Lamotrigine	15)	Motrin		16)	Gabapentin	17)	Vitamin D	18)	Potassium  	  She uses BIPAP nocturnally. She is reportedly allergic to penicillin that causes angioedema and ultram that results in a rash. She is intolerant of morphine. She is reportedly allergic to latex. She does not abuse ethanol and she smoked 1 pack of cigarettes per day for 10 years stopping 30 years ago. Her family history is significant for her father having had cirrhosis. She is  and resides with her spouse, Carmelo France [phone] / [phone] in a private home in [place]. There are four stairs to traverse to enter the resident and there are 10 steps to the bedroom. The patient has an adult daughter, Amelie Elizabeth ([phone]). The patient's daughter and the patient's son-in-law reside upstairs in the same residence as the patient. The patient has previously assigned her spouse to be her healthcare agent / proxy although at other times the patient assigned her daughter to be her healthcare agent / proxy . She has decreased vision and uses corrective lenses. She commonly ambulates with the assistance of a rolling walker and / or a cane. Anesthetics have previously resulted in hallucinations. She denied having had headaches, dizziness, or photophobia. She has not had chest pain, or palpitations but she has dyspnea when climbing a flight of stairs. She denied having had abdominal pain, nausea or emesis, diarrhea or constipation, fever or chills. She has not been jaundiced nor has she had dark urine. She has had post-nasal drip and denied having had bleeding from any area other than her tongue. She has had joint pain, mostly in her back. She has depression and anxiety and denied having had suicidal ideation. Her 10-point review of systems was otherwise negative. Her internist is Dr. Orlin Lucero ([phone]). The patient's cardiologist is Dr. Engel ([phone]).    On presentation to PAT / PST she had a:    BP	=	120/60		P	=	82	R	=	18			Temp	=	37.4		O2 Sat	=	96%	VAS	=	-    She was awake, alert, and fully oriented. She was in no acute distress and did not appear toxic.  She was anicteric. She had reactive pupils and her extra-occular movements were preserved.  She did not have thrush. She had a mass on the left side of her tongue. She did not have JVD.  Her lungs were clear and she had non-labored respirations. She had symmetrical chest wall movements.  She had regular heart tones. She did not have a murmur.  Her abdomen was soft and neither distended nor tender. She was obese. She did not have guarding or rebound. There were no palpable masses or abdominal wall hernias. She had healed surgical scars. There was no CVA tenderness and she had active bowel sounds.  Her extremities were well perfused. She did not have a rash.  She had a healed posterior lumbar surgical scar. She had normal strength. Her musculoskeletal exam was otherwise normal.    Laboratory tests revealed a:    WBC	=	6	Na		=	142	Bilirubin		=	0.9  Neutro	=	-	K		=	3.4	Alk Phos	=	113  Hgb	=	9	Cl		=	104	SGOT		=	22  Hct	=	32%	HCO3		=	21	SGPT		=	6  Plts	=	252	Glucose	=	207  			BUN		=	14	Hemoglobin A1-C	=	7.4  PT	=	-	Creat		=	0.5  PTT	=	-  INR	=	-	Albumin	=	4.0    An EKG revealed NSR. She had inverted "T" waves in lead aVR and aVL.    She was electively admitted to the Otolaryngology service and she underwent anesthesia and surgery commencing at approximately 12:30 PM on 5/17/17. She was ASA 3 and underwent general anesthesia via a 7.0 naso-tracheal tube that was easy to insert. Attempts were made to place a radial arterial cannula. A 16 Fr Lange catheter was inserted. During the approximately 6 hours of anesthesia she was given 2,500 ml of crystalloid and one unit (300 ml) of pRBC. She produced 40 ml of urine and was estimated to have lost 100 ml of blood. During the anesthesia she required vasopressor support with ephedrine and norsynephrine. She was given Lasix. Laboratory tests, obtained at approximately 1 PM, during the anesthesia revealed a:    Na		=	139	Hct	=	29%  K		=	3.6  Glucose	=	114    Prior to commencing the operation she was given 600 mg of clindamycin. She underwent a left glossectomy, left neck dissection and tracheostomy. On completion of the anesthesia and surgery she was transferred to the PACU. During the approximately one hour she was in the PACU she had a:    BP	= 134 - 148/45 - 65	P	= 60 - 67		R	= 12 - 17  Temp	= 37			O2 Sat	= 98% - 100%    She was given aspirin, heparin sq, albuterol, IV fluid, xanax, zoloft, Lamictal, Sinemet, Lasix, Insulin, Lopressor, Losartan, Tylenol, Percocet, and Dilaudid. She was subsequently transferred to the SICU where she is now seen.    While in the SICU she has had a:    BP	=	113 - 152/44 - 65  P	=	60 - 80		O2 Sat	=	94% - 100%  R	=	12 - 19		I/O	=	300 in/440 out (5/17 - 5/18 < 24 hours)  Temp	=	36.4 - 37.2			CRISTIAN = 63 ml    Glucose		=	192	Weight		=	83.5 Kg  					BMI		=	38.5    Awake, alert, and interactive. Communicates effectively. In no distress. Anicteric. Pupils reactive with post-operative changes. Extra-occular movements intact.  Small bore NGT in place.  No thrush. Tongue with operative changes. No bleeding.  Tracheostomy in place with some lenoie-tracheal bloody secretions. CRISTIAN sero-sanguinous.  Lungs with scattered rhonchi. Clear with coughing.  COR - RRR without a murmur.  Abdomen obese. Neither tender nor distended.  Extremities well perfused.    WBC	=	13  Neutro	=	-  Hgb	=	11  Hct	=	37%  Plts	=	259    A chest radiograph revealed the presence of a small bore NGT that ended below the diaphragm. She had a tracheostomy in place and she had a TAVR. She had bilateral shoulder prosthesis.    Remains on:    1)	NS @ 50 ml/hr										              2)	Lasix 40 mg via NGT q24 hours  3)	Lopressor 25 mg via NGT q12 hours					                            4)	Losartan 20 mg via NGT q24 hours					              5)	Aspirin 81 mg po q24 hours								6)	Synthroid 25 micrograms via NGT q24 hours						7)	Zoloft 25 mg via NGT q24 hours								8)	Lamictal 100 mg via NGT q24 hours							9)	Xanax 1 mg via NGT qhs								10)	Sinemet 25/100 via NGT qid								11)	Insulin  	a)	Lantus 30 units sq qAM  	b)	Sliding scale  12)	Tylenol 650 mg po q6 hours prn								13)	Percocet 1 - 2 tab po q4 hours prn							14)	Zofran prn										15)	Albuterol 2.5 mg via nebulizer q6 hours							16)	Heparin 5,000 units sq q12 hours							17)	NPO except medications with sips / chips.						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 She is stable. Plan to:    1)	Admit her to the SICU (done).								2)	Clarify medication orders. No need for parenteral fluid and Lasix concurrently. Will indicate reason for each medication and provide hold parameters for antihypertensive medications.											3)	Commence enteral feedings. Will commence tube feedings and plan for an oral diet in the near future.											4)	Change the aspirin and percocet to administration via the NGT, not orally. Will give oxycodone not percocet and will give as a solution / liquid.					5)	Change the heparin to every 8 hours as opposed to q12 hour dosing.			6)	Commence treatment with a statin. Has had a prior CVA (as evidenced by prior CT scan of her brain) and has coronary artery disease. Unclear as to why she was not being treated with a statin.												7)	Allow and assist her to be out of bed.							8)	Will obtain a physical therapy as well as a speech therapy consultation.			9)	Plan expeditious transfer to the otolaryngology floor.					10)	Full support in place.    Viktor Castro May 17th, 2017  10:40 PM    Day of Hospitalization  Day of Operation  Day of SICU Admission  Initial SICU Evaluation    I was asked to evaluate this patient, render a Surgical Critical Care Consultation and to provide ongoing care and monitoring for this patient. Her chart is reviewed and she is examined.    This patient is a 71-year-old obese, hypertensive, insulin requiring type 2 diabetic female who presented to the Otolaryngology service for elective surgery. Approximately one year ago she had been noted to have a mass on her tongue that intermittently bleed. A biopsy reportedly revealed squamous cell carcinoma prompting the current hospitalization. A PET scan, obtained on 5/15/17 revealed right axillary and retropectoral lymph nodes that were avid for FDG and she had activity in the left side of her tongue. There was no definite FDG avid cervical lymphadenopathy.    She has a medical history significant for having hypertension, hypothyroidism, chronic obstructive pulmonary disease, insulin requiring type 2 diabetes mellitus, obesity, and obstructive sleep apnea. 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 She has chronic diastolic heart failure and is known to have moderate pulmonary hypertension. She has had Parkinson's diease and has had TIA's and a right occipital infarction. She has anxiety and depression. She previously she had a detachment of her left retina and has had bilateral lens implants for cataracts. She had an bioprosthetic aortic valve replacement and she has had coronary artery stents inserted in 2013 (into a mid LAD lesion and a proximal and mid right coronary artery.) She has had a cholecystectomy, a hysterectomy, an appendectomy, and bilateral shoulder surgery for rotator cuff injuries. She has had a lumbar fusion on 5/18/10 at [facility]. On 11/22/14 she was evaluated in the ED at [facility] with complaints of having a cough and dyspnea. Because she had a systolic murmur, on 5/1/15 she was hospitalized on the Medical Service at [facility] for a Trans-esophageal echocardiogram. She was found to have aortic stenosis and was hospitalized on the Cardiac Surgery Surgery Service at [facility] from 6/10/15 - 6/12/15 for placement of a TAVR. She had a 23 mm Bliss Rafia valve inserted. She was again evaluated in the ED at [facility] on 10/19/15 when she had complaints of having dyspnea. Apparently the patient left the ED prior to being evaluated. She was hospitalized on the medical service at [facility] from 4/2/16 - 4/6/16 for evaluation and treatment of weakness and loose bowel movements. While hospitalized her diarrhea resolved and she was discharged to home. She was hospitalized on the Medical Service at [facility] from 2/11/17 - 2/15/17 for evaluation and treatment of syncope. She had a "slight troponin leak" and was treated medically. An EEG failed to reveal evidence of the presence of seizures. A transthoracic echocardiogram, obtained on 2/14/17 revealed a global left ventricular ejection fraction of 60% - 65% with grade II diastolic dysfunction. The right ventricular systolic pressures were estimated to be 52 mm Hg, consistent with moderate pulmonary hypertension. Prior to the current hospitalization she took:    1)	Aspirin		2)	Plavix		3)	Lopressor	4)	Losartan                5)	Lasix		6)	Glucophage	7)	Levemir		8)	Synthroid	              9)	Sinemet	10)	Neupro		11)	Albuterol	12)	Xanax                    13)	Zoloft		14)	Lamotrigine	15)	Motrin		16)	Gabapentin	17)	Vitamin D	18)	Potassium  	  She uses BIPAP nocturnally. She is reportedly allergic to penicillin that causes angioedema and ultram that results in a rash. She is intolerant of morphine. She is reportedly allergic to latex. She does not abuse ethanol and she smoked 1 pack of cigarettes per day for 10 years stopping 30 years ago. Her family history is significant for her father having had cirrhosis. She is  and resides with her spouse, Carmelo France [phone] / [phone] in a private home in [place]. There are four stairs to traverse to enter the resident and there are 10 steps to the bedroom. The patient has an adult daughter, Amelie Elizabeth ([phone]). The patient's daughter and the patient's son-in-law reside upstairs in the same residence as the patient. The patient has previously assigned her spouse to be her healthcare agent / proxy although at other times the patient assigned her daughter to be her healthcare agent / proxy . She has decreased vision and uses corrective lenses. She commonly ambulates with the assistance of a rolling walker and / or a cane. Anesthetics have previously resulted in hallucinations. She denied having had headaches, dizziness, or photophobia. She has not had chest pain, or palpitations but she has dyspnea when climbing a flight of stairs. She denied having had abdominal pain, nausea or emesis, diarrhea or constipation, fever or chills. She has not been jaundiced nor has she had dark urine. She has had post-nasal drip and denied having had bleeding from any area other than her tongue. She has had joint pain, mostly in her back. She has depression and anxiety and denied having had suicidal ideation. Her 10-point review of systems was otherwise negative. Her internist is Dr. Orlin Lucero ([phone]). The patient's cardiologist is Dr. Engel ([phone]).    On presentation to PAT / PST she had a:    BP	=	120/60		P	=	82	R	=	18			Temp	=	37.4		O2 Sat	=	96%	VAS	=	-    She was awake, alert, and fully oriented. She was in no acute distress and did not appear toxic.  She was anicteric. She had reactive pupils and her extra-occular movements were preserved.  She did not have thrush. She had a mass on the left side of her tongue. She did not have JVD.  Her lungs were clear and she had non-labored respirations. She had symmetrical chest wall movements.  She had regular heart tones. She did not have a murmur.  Her abdomen was soft and neither distended nor tender. She was obese. She did not have guarding or rebound. There were no palpable masses or abdominal wall hernias. She had healed surgical scars. There was no CVA tenderness and she had active bowel sounds.  Her extremities were well perfused. She did not have a rash.  She had a healed posterior lumbar surgical scar. She had normal strength. Her musculoskeletal exam was otherwise normal.    Laboratory tests revealed a:    WBC	=	6	Na		=	142	Bilirubin		=	0.9  Neutro	=	-	K		=	3.4	Alk Phos	=	113  Hgb	=	9	Cl		=	104	SGOT		=	22  Hct	=	32%	HCO3		=	21	SGPT		=	6  Plts	=	252	Glucose	=	207  			BUN		=	14	Hemoglobin A1-C	=	7.4  PT	=	-	Creat		=	0.5  PTT	=	-  INR	=	-	Albumin	=	4.0    An EKG revealed NSR. She had inverted "T" waves in lead aVR and aVL.    She was electively admitted to the Otolaryngology service and she underwent anesthesia and surgery commencing at approximately 12:30 PM on 5/17/17. She was ASA 3 and underwent general anesthesia via a 7.0 naso-tracheal tube that was easy to insert. Attempts were made to place a radial arterial cannula. A 16 Fr Lange catheter was inserted. During the approximately 6 hours of anesthesia she was given 2,500 ml of crystalloid and one unit (300 ml) of pRBC. She produced 40 ml of urine and was estimated to have lost 100 ml of blood. During the anesthesia she required vasopressor support with ephedrine and norsynephrine. She was given Lasix. Laboratory tests, obtained at approximately 1 PM, during the anesthesia revealed a:    Na		=	139	Hct	=	29%  K		=	3.6  Glucose	=	114    Prior to commencing the operation she was given 600 mg of clindamycin. She underwent a left glossectomy, left neck dissection and tracheostomy. On completion of the anesthesia and surgery she was transferred to the PACU. During the approximately one hour she was in the PACU she had a:    BP	= 134 - 148/45 - 65	P	= 60 - 67		R	= 12 - 17  Temp	= 37			O2 Sat	= 98% - 100%    She was given aspirin, heparin sq, albuterol, IV fluid, xanax, zoloft, Lamictal, Sinemet, Lasix, Insulin, Lopressor, Losartan, Tylenol, Percocet, and Dilaudid. She was subsequently transferred to the SICU where she is now seen.    While in the SICU she has had a:    BP	=	113 - 152/44 - 65  P	=	60 - 80		O2 Sat	=	94% - 100%  R	=	12 - 19		I/O	=	300 in/440 out (5/17 - 5/18 < 24 hours)  Temp	=	36.4 - 37.2			CRISTIAN = 63 ml    Glucose		=	192	Weight		=	83.5 Kg  					BMI		=	38.5    Awake, alert, and interactive. Communicates effectively. In no distress. Anicteric. Pupils reactive with post-operative changes. Extra-occular movements intact.  Small bore NGT in place.  No thrush. Tongue with operative changes. No bleeding.  Tracheostomy in place with some leonie-tracheal bloody secretions. CRISTIAN sero-sanguinous.  Lungs with scattered rhonchi. Clear with coughing.  COR - RRR without a murmur.  Abdomen obese. Neither tender nor distended.  Extremities well perfused.    WBC	=	13  Neutro	=	-  Hgb	=	11  Hct	=	37%  Plts	=	259    A chest radiograph revealed the presence of a small bore NGT that ended below the diaphragm. She had a tracheostomy in place and she had a TAVR. She had bilateral shoulder prosthesis.    Remains on:    1)	NS @ 50 ml/hr										              2)	Lasix 40 mg via NGT q24 hours  3)	Lopressor 25 mg via NGT q12 hours					                            4)	Losartan 20 mg via NGT q24 hours					              	5)	Aspirin 81 mg po q24 hours								6)	Synthroid 25 micrograms via NGT q24 hours						7)	Zoloft 25 mg via NGT q24 hours								8)	Lamictal 100 mg via NGT q24 hours							9)	Xanax 1 mg via NGT qhs								10)	Sinemet 25/100 via NGT qid								11)	Insulin  	a)	Lantus 30 units sq qAM  	b)	Sliding scale  12)	Tylenol 650 mg po q6 hours prn								13)	Percocet 1 - 2 tab po q4 hours prn							14)	Zofran prn										15)	Albuterol 2.5 mg via nebulizer q6 hours							16)	Heparin 5,000 units sq q12 hours							17)	NPO except medications with sips / chips.						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 She is stable. Plan to:    1)	Admit her to the SICU (done).								2)	Clarify medication orders. No need for parenteral fluid and Lasix concurrently. Will indicate reason for each medication and provide hold parameters for antihypertensive medications.											3)	Commence enteral feedings. Will commence tube feedings and plan for an oral diet in the near future.											4)	Change the aspirin and percocet to administration via the NGT, not orally. Will give oxycodone not percocet and will give as a solution / liquid.					5)	Change the heparin to every 8 hours as opposed to q12 hour dosing.			6)	Commence treatment with a statin. Has had a prior CVA (as evidenced by prior CT scan of her brain) and has coronary artery disease. Unclear as to why she was not being treated with a statin.												7)	Allow and assist her to be out of bed.							8)	Will obtain a physical therapy as well as a speech therapy consultation.			9)	Plan expeditious transfer to the otolaryngology floor.					10)	Full support in place.    Viktor Castro May 17th, 2017  10:40 PM    Day of Hospitalization  Day of Operation  Day of SICU Admission  Initial SICU Evaluation    I was asked to evaluate this patient, render a Surgical Critical Care Consultation and to provide ongoing care and monitoring for this patient. Her chart is reviewed and she is examined.    This patient is a 71-year-old obese, hypertensive, insulin requiring type 2 diabetic female who presented to the Otolaryngology service for elective surgery. Approximately one year ago she had been noted to have a mass on her tongue that intermittently bleed. A biopsy reportedly revealed squamous cell carcinoma prompting the current hospitalization. A PET scan, obtained on 5/15/17 revealed right axillary and retropectoral lymph nodes that were avid for FDG and she had activity in the left side of her tongue. There was no definite FDG avid cervical lymphadenopathy.    She has a medical history significant for having hypertension, hypothyroidism, chronic obstructive pulmonary disease, insulin requiring type 2 diabetes mellitus, obesity, and obstructive sleep apnea. 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 She has chronic diastolic heart failure and is known to have moderate pulmonary hypertension. She has had Parkinson's diease and has had TIA's and a right occipital infarction. She has anxiety and depression. She previously she had a detachment of her left retina and has had bilateral lens implants for cataracts. She had an bioprosthetic aortic valve replacement and she has had coronary artery stents inserted in 2013 (into a mid LAD lesion and a proximal and mid right coronary artery.) She has had a cholecystectomy, a hysterectomy, an appendectomy, and bilateral shoulder surgery for rotator cuff injuries. She has had a lumbar fusion on 5/18/10 at [facility]. On 11/22/14 she was evaluated in the ED at [facility] with complaints of having a cough and dyspnea. Because she had a systolic murmur, on 5/1/15 she was hospitalized on the Medical Service at [facility] for a Trans-esophageal echocardiogram. She was found to have aortic stenosis and was hospitalized on the Cardiac Surgery Surgery Service at [facility] from 6/10/15 - 6/12/15 for placement of a TAVR. She had a 23 mm Bliss Rafia valve inserted. She was again evaluated in the ED at [facility] on 10/19/15 when she had complaints of having dyspnea. Apparently the patient left the ED prior to being evaluated. She was hospitalized on the medical service at [facility] from 4/2/16 - 4/6/16 for evaluation and treatment of weakness and loose bowel movements. While hospitalized her diarrhea resolved and she was discharged to home. She was hospitalized on the Medical Service at [facility] from 2/11/17 - 2/15/17 for evaluation and treatment of syncope. She had a "slight troponin leak" and was treated medically. An EEG failed to reveal evidence of the presence of seizures. A transthoracic echocardiogram, obtained on 2/14/17 revealed a global left ventricular ejection fraction of 60% - 65% with grade II diastolic dysfunction. The right ventricular systolic pressures were estimated to be 52 mm Hg, consistent with moderate pulmonary hypertension. Prior to the current hospitalization she took:    1)	Aspirin		2)	Plavix		3)	Lopressor	4)	Losartan                5)	Lasix		6)	Glucophage	7)	Levemir		8)	Synthroid	              9)	Sinemet	10)	Neupro		11)	Albuterol	12)	Xanax                    13)	Zoloft		14)	Lamotrigine	15)	Motrin		16)	Gabapentin                	17)	Vitamin D	18)	Potassium  	  She uses BIPAP nocturnally. She is reportedly allergic to penicillin that causes angioedema and ultram that results in a rash. She is intolerant of morphine. She is reportedly allergic to latex. She does not abuse ethanol and she smoked 1 pack of cigarettes per day for 10 years stopping 30 years ago. Her family history is significant for her father having had cirrhosis. She is  and resides with her spouse, Carmelo France [phone] / [phone] in a private home in [place]. There are four stairs to traverse to enter the resident and there are 10 steps to the bedroom. The patient has an adult daughter, Amelie Elizabeth ([phone]). The patient's daughter and the patient's son-in-law reside upstairs in the same residence as the patient. The patient has previously assigned her spouse to be her healthcare agent / proxy although at other times the patient assigned her daughter to be her healthcare agent / proxy . She has decreased vision and uses corrective lenses. She commonly ambulates with the assistance of a rolling walker and / or a cane. Anesthetics have previously resulted in hallucinations. She denied having had headaches, dizziness, or photophobia. She has not had chest pain, or palpitations but she has dyspnea when climbing a flight of stairs. She denied having had abdominal pain, nausea or emesis, diarrhea or constipation, fever or chills. She has not been jaundiced nor has she had dark urine. She has had post-nasal drip and denied having had bleeding from any area other than her tongue. She has had joint pain, mostly in her back. She has depression and anxiety and denied having had suicidal ideation. Her 10-point review of systems was otherwise negative. Her internist is Dr. Orlin Lucero ([phone]). The patient's cardiologist is Dr. Engel ([phone]).    On presentation to PAT / PST she had a:    BP	=	120/60		P	=	82	R	=	18			Temp	=	37.4		O2 Sat	=	96%	VAS	=	-    She was awake, alert, and fully oriented. She was in no acute distress and did not appear toxic.  She was anicteric. She had reactive pupils and her extra-occular movements were preserved.  She did not have thrush. She had a mass on the left side of her tongue. She did not have JVD.  Her lungs were clear and she had non-labored respirations. She had symmetrical chest wall movements.  She had regular heart tones. She did not have a murmur.  Her abdomen was soft and neither distended nor tender. She was obese. She did not have guarding or rebound. There were no palpable masses or abdominal wall hernias. She had healed surgical scars. There was no CVA tenderness and she had active bowel sounds.  Her extremities were well perfused. She did not have a rash.  She had a healed posterior lumbar surgical scar. She had normal strength. Her musculoskeletal exam was otherwise normal.    Laboratory tests revealed a:    WBC	=	6	Na		=	142	Bilirubin		=	0.9  Neutro	=	-	K		=	3.4	Alk Phos	=	113  Hgb	=	9	Cl		=	104	SGOT		=	22  Hct	=	32%	HCO3		=	21	SGPT		=	6  Plts	=	252	Glucose	=	207  			BUN		=	14	Hemoglobin A1-C	=	7.4  PT	=	-	Creat		=	0.5  PTT	=	-  INR	=	-	Albumin	=	4.0    An EKG revealed NSR. She had inverted "T" waves in lead aVR and aVL.    She was electively admitted to the Otolaryngology service and she underwent anesthesia and surgery commencing at approximately 12:30 PM on 5/17/17. She was ASA 3 and underwent general anesthesia via a 7.0 naso-tracheal tube that was easy to insert. Attempts were made to place a radial arterial cannula. A 16 Fr Lange catheter was inserted. During the approximately 6 hours of anesthesia she was given 2,500 ml of crystalloid and one unit (300 ml) of pRBC. She produced 40 ml of urine and was estimated to have lost 100 ml of blood. During the anesthesia she required vasopressor support with ephedrine and norsynephrine. She was given Lasix. Laboratory tests, obtained at approximately 1 PM, during the anesthesia revealed a:    Na		=	139	Hct	=	29%  K		=	3.6  Glucose	=	114    Prior to commencing the operation she was given 600 mg of clindamycin. She underwent a left glossectomy, left neck dissection and tracheostomy. On completion of the anesthesia and surgery she was transferred to the PACU. During the approximately one hour she was in the PACU she had a:    BP	= 134 - 148/45 - 65	P	= 60 - 67		R	= 12 - 17  Temp	= 37			O2 Sat	= 98% - 100%    She was given aspirin, heparin sq, albuterol, IV fluid, xanax, zoloft, Lamictal, Sinemet, Lasix, Insulin, Lopressor, Losartan, Tylenol, Percocet, and Dilaudid. She was subsequently transferred to the SICU where she is now seen.    While in the SICU she has had a:    BP	=	113 - 152/44 - 65  P	=	60 - 80		O2 Sat	=	94% - 100%  R	=	12 - 19		I/O	=	300 in/440 out (5/17 - 5/18 < 24 hours)  Temp	=	36.4 - 37.2			CRISTIAN = 63 ml    Glucose		=	192	Weight		=	83.5 Kg  					BMI		=	38.5    Awake, alert, and interactive. Communicates effectively. In no distress. Anicteric. Pupils reactive with post-operative changes. Extra-occular movements intact.  Small bore NGT in place.  No thrush. Tongue with operative changes. No bleeding.  Tracheostomy in place with some leonie-tracheal bloody secretions. CRISTIAN sero-sanguinous.  Lungs with scattered rhonchi. Clear with coughing.  COR - RRR without a murmur.  Abdomen obese. Neither tender nor distended.  Extremities well perfused.    WBC	=	13  Neutro	=	-  Hgb	=	11  Hct	=	37%  Plts	=	259    A chest radiograph revealed the presence of a small bore NGT that ended below the diaphragm. She had a tracheostomy in place and she had a TAVR. She had bilateral shoulder prosthesis.    Remains on:    1)	NS @ 50 ml/hr										              2)	Lasix 40 mg via NGT q24 hours  3)	Lopressor 25 mg via NGT q12 hours					                            4)	Losartan 20 mg via NGT q24 hours						5)	Aspirin 81 mg po q24 hours								6)	Synthroid 25 micrograms via NGT q24 hours						7)	Zoloft 25 mg via NGT q24 hours								8)	Lamictal 100 mg via NGT q24 hours							9)	Xanax 1 mg via NGT qhs								10)	Sinemet 25/100 via NGT qid								11)	Insulin  	a)	Lantus 30 units sq qAM  	b)	Sliding scale  12)	Tylenol 650 mg po q6 hours prn								13)	Percocet 1 - 2 tab po q4 hours prn							14)	Zofran prn										15)	Albuterol 2.5 mg via nebulizer q6 hours							16)	Heparin 5,000 units sq q12 hours							17)	NPO except medications with sips / chips.						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 She is stable. Plan to:    1)	Admit her to the SICU (done).								2)	Clarify medication orders. No need for parenteral fluid and Lasix concurrently. Will indicate reason for each medication and provide hold parameters for antihypertensive medications.											3)	Commence enteral feedings. Will commence tube feedings and plan for an oral diet in the near future.											4)	Change the aspirin and percocet to administration via the NGT, not orally. Will give oxycodone not percocet and will give as a solution / liquid.					5)	Change the heparin to every 8 hours as opposed to q12 hour dosing.			6)	Commence treatment with a statin. Has had a prior CVA (as evidenced by prior CT scan of her brain) and has coronary artery disease. Unclear as to why she was not being treated with a statin.												7)	Allow and assist her to be out of bed.							8)	Will obtain a physical therapy as well as a speech therapy consultation.			9)	Plan expeditious transfer to the otolaryngology floor.					10)	Full support in place.    Viktor Castro May 17th, 2017  10:40 PM    Day of Hospitalization  Day of Operation  Day of SICU Admission  Initial SICU Evaluation    I was asked to evaluate this patient, render a Surgical Critical Care Consultation and to provide ongoing care and monitoring for this patient. Her chart is reviewed and she is examined.    This patient is a 71-year-old obese, hypertensive, insulin requiring type 2 diabetic female who presented to the Otolaryngology service for elective surgery. Approximately one year ago she had been noted to have a mass on her tongue that intermittently bleed. A biopsy reportedly revealed squamous cell carcinoma prompting the current hospitalization. A PET scan, obtained on 5/15/17 revealed right axillary and retropectoral lymph nodes that were avid for FDG and she had activity in the left side of her tongue. There was no definite FDG avid cervical lymphadenopathy.    She has a medical history significant for having hypertension, hypothyroidism, chronic obstructive pulmonary disease, insulin requiring type 2 diabetes mellitus, obesity, and obstructive sleep apnea. 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 She has chronic diastolic heart failure and is known to have moderate pulmonary hypertension. She has had Parkinson's diease and has had TIA's and a right occipital infarction. She has anxiety and depression. She previously she had a detachment of her left retina and has had bilateral lens implants for cataracts. She had an bioprosthetic aortic valve replacement and she has had coronary artery stents inserted in 2013 (into a mid LAD lesion and a proximal and mid right coronary artery.) She has had a cholecystectomy, a hysterectomy, an appendectomy, and bilateral shoulder surgery for rotator cuff injuries. She has had a lumbar fusion on 5/18/10 at [facility]. On 11/22/14 she was evaluated in the ED at [facility] with complaints of having a cough and dyspnea. Because she had a systolic murmur, on 5/1/15 she was hospitalized on the Medical Service at [facility] for a Trans-esophageal echocardiogram. She was found to have aortic stenosis and was hospitalized on the Cardiac Surgery Surgery Service at [facility] from 6/10/15 - 6/12/15 for placement of a TAVR. She had a 23 mm Bliss Rafia valve inserted. She was again evaluated in the ED at [facility] on 10/19/15 when she had complaints of having dyspnea. Apparently the patient left the ED prior to being evaluated. She was hospitalized on the medical service at [facility] from 4/2/16 - 4/6/16 for evaluation and treatment of weakness and loose bowel movements. While hospitalized her diarrhea resolved and she was discharged to home. She was hospitalized on the Medical Service at [facility] from 2/11/17 - 2/15/17 for evaluation and treatment of syncope. She had a "slight troponin leak" and was treated medically. An EEG failed to reveal evidence of the presence of seizures. A transthoracic echocardiogram, obtained on 2/14/17 revealed a global left ventricular ejection fraction of 60% - 65% with grade II diastolic dysfunction. The right ventricular systolic pressures were estimated to be 52 mm Hg, consistent with moderate pulmonary hypertension. Prior to the current hospitalization she took:    1)	Aspirin		2)	Plavix		3)	Lopressor	4)	Losartan                5)	Lasix		6)	Glucophage	7)	Levemir		8)	Synthroid	              9)	Sinemet	10)	Neupro		11)	Albuterol	12)	Xanax                    13)	Zoloft		14)	Lamotrigine	15)	Motrin		16)	Gabapentin                             17)	Vitamin D	18)	Potassium  	  She uses BIPAP nocturnally. She is reportedly allergic to penicillin that causes angioedema and ultram that results in a rash. She is intolerant of morphine. She is reportedly allergic to latex. She does not abuse ethanol and she smoked 1 pack of cigarettes per day for 10 years stopping 30 years ago. Her family history is significant for her father having had cirrhosis. She is  and resides with her spouse, Carmelo France [phone] / [phone] in a private home in [place]. There are four stairs to traverse to enter the resident and there are 10 steps to the bedroom. The patient has an adult daughter, Amelie Elizabeth ([phone]). The patient's daughter and the patient's son-in-law reside upstairs in the same residence as the patient. The patient has previously assigned her spouse to be her healthcare agent / proxy although at other times the patient assigned her daughter to be her healthcare agent / proxy . She has decreased vision and uses corrective lenses. She commonly ambulates with the assistance of a rolling walker and / or a cane. Anesthetics have previously resulted in hallucinations. She denied having had headaches, dizziness, or photophobia. She has not had chest pain, or palpitations but she has dyspnea when climbing a flight of stairs. She denied having had abdominal pain, nausea or emesis, diarrhea or constipation, fever or chills. She has not been jaundiced nor has she had dark urine. She has had post-nasal drip and denied having had bleeding from any area other than her tongue. She has had joint pain, mostly in her back. She has depression and anxiety and denied having had suicidal ideation. Her 10-point review of systems was otherwise negative. Her internist is Dr. Orlin Lucero ([phone]). The patient's cardiologist is Dr. Engel ([phone]).    On presentation to PAT / PST she had a:    BP	=	120/60		P	=	82	R	=	18			Temp	=	37.4		O2 Sat	=	96%	VAS	=	-    She was awake, alert, and fully oriented. She was in no acute distress and did not appear toxic.  She was anicteric. She had reactive pupils and her extra-occular movements were preserved.  She did not have thrush. She had a mass on the left side of her tongue. She did not have JVD.  Her lungs were clear and she had non-labored respirations. She had symmetrical chest wall movements.  She had regular heart tones. She did not have a murmur.  Her abdomen was soft and neither distended nor tender. She was obese. She did not have guarding or rebound. There were no palpable masses or abdominal wall hernias. She had healed surgical scars. There was no CVA tenderness and she had active bowel sounds.  Her extremities were well perfused. She did not have a rash.  She had a healed posterior lumbar surgical scar. She had normal strength. Her musculoskeletal exam was otherwise normal.    Laboratory tests revealed a:    WBC	=	6	Na		=	142	Bilirubin		=	0.9  Neutro	=	-	K		=	3.4	Alk Phos	=	113  Hgb	=	9	Cl		=	104	SGOT		=	22  Hct	=	32%	HCO3		=	21	SGPT		=	6  Plts	=	252	Glucose	=	207  			BUN		=	14	Hemoglobin A1-C	=	7.4  PT	=	-	Creat		=	0.5  PTT	=	-  INR	=	-	Albumin	=	4.0    An EKG revealed NSR. She had inverted "T" waves in lead aVR and aVL.    She was electively admitted to the Otolaryngology service and she underwent anesthesia and surgery commencing at approximately 12:30 PM on 5/17/17. She was ASA 3 and underwent general anesthesia via a 7.0 naso-tracheal tube that was easy to insert. Attempts were made to place a radial arterial cannula. A 16 Fr Lange catheter was inserted. During the approximately 6 hours of anesthesia she was given 2,500 ml of crystalloid and one unit (300 ml) of pRBC. She produced 40 ml of urine and was estimated to have lost 100 ml of blood. During the anesthesia she required vasopressor support with ephedrine and norsynephrine. She was given Lasix. Laboratory tests, obtained at approximately 1 PM, during the anesthesia revealed a:    Na		=	139	Hct	=	29%  K		=	3.6  Glucose	=	114    Prior to commencing the operation she was given 600 mg of clindamycin. She underwent a left glossectomy, left neck dissection and tracheostomy. On completion of the anesthesia and surgery she was transferred to the PACU. During the approximately one hour she was in the PACU she had a:    BP	= 134 - 148/45 - 65	P	= 60 - 67		R	= 12 - 17  Temp	= 37			O2 Sat	= 98% - 100%    She was given aspirin, heparin sq, albuterol, IV fluid, xanax, zoloft, Lamictal, Sinemet, Lasix, Insulin, Lopressor, Losartan, Tylenol, Percocet, and Dilaudid. She was subsequently transferred to the SICU where she is now seen.    While in the SICU she has had a:    BP	=	113 - 152/44 - 65  P	=	60 - 80		O2 Sat	=	94% - 100%  R	=	12 - 19		I/O	=	300 in/440 out (5/17 - 5/18 < 24 hours)  Temp	=	36.4 - 37.2			CRISTIAN = 63 ml    Glucose		=	192	Weight		=	83.5 Kg  					BMI		=	38.5    Awake, alert, and interactive. Communicates effectively. In no distress. Anicteric. Pupils reactive with post-operative changes. Extra-occular movements intact.  Small bore NGT in place.  No thrush. Tongue with operative changes. No bleeding.  Tracheostomy in place with some leonie-tracheal bloody secretions. CRISTIAN sero-sanguinous.  Lungs with scattered rhonchi. Clear with coughing.  COR - RRR without a murmur.  Abdomen obese. Neither tender nor distended.  Extremities well perfused.    WBC	=	13  Neutro	=	-  Hgb	=	11  Hct	=	37%  Plts	=	259    A chest radiograph revealed the presence of a small bore NGT that ended below the diaphragm. She had a tracheostomy in place and she had a TAVR. She had bilateral shoulder prosthesis.    Remains on:    1)	NS @ 50 ml/hr										              2)	Lasix 40 mg via NGT q24 hours  3)	Lopressor 25 mg via NGT q12 hours					                            4)	Losartan 20 mg via NGT q24 hours						5)	Aspirin 81 mg po q24 hours								6)	Synthroid 25 micrograms via NGT q24 hours						7)	Zoloft 25 mg via NGT q24 hours								8)	Lamictal 100 mg via NGT q24 hours							9)	Xanax 1 mg via NGT qhs								10)	Sinemet 25/100 via NGT qid								11)	Insulin  	a)	Lantus 30 units sq qAM  	b)	Sliding scale  12)	Tylenol 650 mg po q6 hours prn								13)	Percocet 1 - 2 tab po q4 hours prn							14)	Zofran prn										15)	Albuterol 2.5 mg via nebulizer q6 hours							16)	Heparin 5,000 units sq q12 hours							17)	NPO except medications with sips / chips.						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 She is stable. Plan to:    1)	Admit her to the SICU (done).								2)	Clarify medication orders. No need for parenteral fluid and Lasix concurrently. Will indicate reason for each medication and provide hold parameters for antihypertensive medications.											3)	Commence enteral feedings. Will commence tube feedings and plan for an oral diet in the near future.											4)	Change the aspirin and percocet to administration via the NGT, not orally. Will give oxycodone not percocet and will give as a solution / liquid.					5)	Change the heparin to every 8 hours as opposed to q12 hour dosing.			6)	Commence treatment with a statin. Has had a prior CVA (as evidenced by prior CT scan of her brain) and has coronary artery disease. Unclear as to why she was not being treated with a statin.												7)	Allow and assist her to be out of bed.							8)	Will obtain a physical therapy as well as a speech therapy consultation.			9)	Plan expeditious transfer to the otolaryngology floor.					10)	Full support in place.    Viktor Castro May 17th, 2017  10:40 PM    Day of Hospitalization  Day of Operation  Day of SICU Admission  Initial SICU Evaluation    I was asked to evaluate this patient, render a Surgical Critical Care Consultation and to provide ongoing care and monitoring for this patient. Her chart is reviewed and she is examined.    This patient is a 71-year-old obese, hypertensive, insulin requiring type 2 diabetic female who presented to the Otolaryngology service for elective surgery. Approximately one year ago she had been noted to have a mass on her tongue that intermittently bleed. A biopsy reportedly revealed squamous cell carcinoma prompting the current hospitalization. A PET scan, obtained on 5/15/17 revealed right axillary and retropectoral lymph nodes that were avid for FDG and she had activity in the left side of her tongue. There was no definite FDG avid cervical lymphadenopathy.    She has a medical history significant for having hypertension, hypothyroidism, chronic obstructive pulmonary disease, insulin requiring type 2 diabetes mellitus, obesity, and obstructive sleep apnea. 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 She has chronic diastolic heart failure and is known to have moderate pulmonary hypertension. She has had Parkinson's diease and has had TIA's and a right occipital infarction. She has anxiety and depression. She previously she had a detachment of her left retina and has had bilateral lens implants for cataracts. She had an bioprosthetic aortic valve replacement and she has had coronary artery stents inserted in 2013 (into a mid LAD lesion and a proximal and mid right coronary artery.) She has had a cholecystectomy, a hysterectomy, an appendectomy, and bilateral shoulder surgery for rotator cuff injuries. She has had a lumbar fusion on 5/18/10 at [facility]. On 11/22/14 she was evaluated in the ED at [facility] with complaints of having a cough and dyspnea. Because she had a systolic murmur, on 5/1/15 she was hospitalized on the Medical Service at [facility] for a Trans-esophageal echocardiogram. She was found to have aortic stenosis and was hospitalized on the Cardiac Surgery Surgery Service at [facility] from 6/10/15 - 6/12/15 for placement of a TAVR. She had a 23 mm Bliss Rafia valve inserted. She was again evaluated in the ED at [facility] on 10/19/15 when she had complaints of having dyspnea. Apparently the patient left the ED prior to being evaluated. She was hospitalized on the medical service at [facility] from 4/2/16 - 4/6/16 for evaluation and treatment of weakness and loose bowel movements. While hospitalized her diarrhea resolved and she was discharged to home. She was hospitalized on the Medical Service at [facility] from 2/11/17 - 2/15/17 for evaluation and treatment of syncope. She had a "slight troponin leak" and was treated medically. An EEG failed to reveal evidence of the presence of seizures. A transthoracic echocardiogram, obtained on 2/14/17 revealed a global left ventricular ejection fraction of 60% - 65% with grade II diastolic dysfunction. The right ventricular systolic pressures were estimated to be 52 mm Hg, consistent with moderate pulmonary hypertension. Prior to the current hospitalization she took:    1)	Aspirin		2)	Plavix		3)	Lopressor	4)	Losartan                5)	Lasix		6)	Glucophage	7)	Levemir		8)	Synthroid	              9)	Sinemet	10)	Neupro		11)	Albuterol	12)	Xanax                    13)	Zoloft		14)	Lamotrigine	15)	Motrin		16)	Gabapentin                             17)	Vitamin D	18)	Potassium  	  She uses BIPAP nocturnally. She is reportedly allergic to penicillin that causes angioedema and ultram that results in a rash. She is intolerant of morphine. She is reportedly allergic to latex. She does not abuse ethanol and she smoked 1 pack of cigarettes per day for 10 years stopping 30 years ago. Her family history is significant for her father having had cirrhosis. She is  and resides with her spouse, Carmelo France [phone] / [phone] in a private home in [place]. There are four stairs to traverse to enter the resident and there are 10 steps to the bedroom. The patient has an adult daughter, Amelie Elizabeth ([phone]). The patient's daughter and the patient's son-in-law reside upstairs in the same residence as the patient. The patient has previously assigned her spouse to be her healthcare agent / proxy although at other times the patient assigned her daughter to be her healthcare agent / proxy . She has decreased vision and uses corrective lenses. She commonly ambulates with the assistance of a rolling walker and / or a cane. Anesthetics have previously resulted in hallucinations. She denied having had headaches, dizziness, or photophobia. She has not had chest pain, or palpitations but she has dyspnea when climbing a flight of stairs. She denied having had abdominal pain, nausea or emesis, diarrhea or constipation, fever or chills. She has not been jaundiced nor has she had dark urine. She has had post-nasal drip and denied having had bleeding from any area other than her tongue. She has had joint pain, mostly in her back. She has depression and anxiety and denied having had suicidal ideation. Her 10-point review of systems was otherwise negative. Her internist is Dr. Orlin Lucero ([phone]). The patient's cardiologist is Dr. Engel ([phone]).    On presentation to PAT / PST she had a:    BP	=	120/60		P	=	82	R	=	18	              	Temp	=	37.4		O2 Sat	=	96%	VAS	=	-    She was awake, alert, and fully oriented. She was in no acute distress and did not appear toxic.  She was anicteric. She had reactive pupils and her extra-occular movements were preserved.  She did not have thrush. She had a mass on the left side of her tongue. She did not have JVD.  Her lungs were clear and she had non-labored respirations. She had symmetrical chest wall movements.  She had regular heart tones. She did not have a murmur.  Her abdomen was soft and neither distended nor tender. She was obese. She did not have guarding or rebound. There were no palpable masses or abdominal wall hernias. She had healed surgical scars. There was no CVA tenderness and she had active bowel sounds.  Her extremities were well perfused. She did not have a rash.  She had a healed posterior lumbar surgical scar. She had normal strength. Her musculoskeletal exam was otherwise normal.    Laboratory tests revealed a:    WBC	=	6	Na		=	142	Bilirubin		=	0.9  Neutro	=	-	K		=	3.4	Alk Phos	=	113  Hgb	=	9	Cl		=	104	SGOT		=	22  Hct	=	32%	HCO3		=	21	SGPT		=	6  Plts	=	252	Glucose	=	207  			BUN		=	14	Hemoglobin A1-C	=	7.4  PT	=	-	Creat		=	0.5  PTT	=	-  INR	=	-	Albumin	=	4.0    An EKG revealed NSR. She had inverted "T" waves in lead aVR and aVL.    She was electively admitted to the Otolaryngology service and she underwent anesthesia and surgery commencing at approximately 12:30 PM on 5/17/17. She was ASA 3 and underwent general anesthesia via a 7.0 naso-tracheal tube that was easy to insert. Attempts were made to place a radial arterial cannula. A 16 Fr Lange catheter was inserted. During the approximately 6 hours of anesthesia she was given 2,500 ml of crystalloid and one unit (300 ml) of pRBC. She produced 40 ml of urine and was estimated to have lost 100 ml of blood. During the anesthesia she required vasopressor support with ephedrine and norsynephrine. She was given Lasix. Laboratory tests, obtained at approximately 1 PM, during the anesthesia revealed a:    Na		=	139	Hct	=	29%  K		=	3.6  Glucose	=	114    Prior to commencing the operation she was given 600 mg of clindamycin. She underwent a left glossectomy, left neck dissection and tracheostomy. On completion of the anesthesia and surgery she was transferred to the PACU. During the approximately one hour she was in the PACU she had a:    BP	= 134 - 148/45 - 65	P	= 60 - 67		R	= 12 - 17  Temp	= 37			O2 Sat	= 98% - 100%    She was given aspirin, heparin sq, albuterol, IV fluid, xanax, zoloft, Lamictal, Sinemet, Lasix, Insulin, Lopressor, Losartan, Tylenol, Percocet, and Dilaudid. She was subsequently transferred to the SICU where she is now seen.    While in the SICU she has had a:    BP	=	113 - 152/44 - 65  P	=	60 - 80		O2 Sat	=	94% - 100%  R	=	12 - 19		I/O	=	300 in/440 out (5/17 - 5/18 < 24 hours)  Temp	=	36.4 - 37.2			CRISTIAN = 63 ml    Glucose		=	192	Weight		=	83.5 Kg  					BMI		=	38.5    Awake, alert, and interactive. Communicates effectively. In no distress. Anicteric. Pupils reactive with post-operative changes. Extra-occular movements intact.  Small bore NGT in place.  No thrush. Tongue with operative changes. No bleeding.  Tracheostomy in place with some leonie-tracheal bloody secretions. CRISTIAN sero-sanguinous.  Lungs with scattered rhonchi. Clear with coughing.  COR - RRR without a murmur.  Abdomen obese. Neither tender nor distended.  Extremities well perfused.    WBC	=	13  Neutro	=	-  Hgb	=	11  Hct	=	37%  Plts	=	259    A chest radiograph revealed the presence of a small bore NGT that ended below the diaphragm. She had a tracheostomy in place and she had a TAVR. She had bilateral shoulder prosthesis.    Remains on:    1)	NS @ 50 ml/hr										              2)	Lasix 40 mg via NGT q24 hours  3)	Lopressor 25 mg via NGT q12 hours					                            4)	Losartan 20 mg via NGT q24 hours						5)	Aspirin 81 mg po q24 hours								6)	Synthroid 25 micrograms via NGT q24 hours						7)	Zoloft 25 mg via NGT q24 hours								8)	Lamictal 100 mg via NGT q24 hours							9)	Xanax 1 mg via NGT qhs								10)	Sinemet 25/100 via NGT qid								11)	Insulin  	a)	Lantus 30 units sq qAM  	b)	Sliding scale  12)	Tylenol 650 mg po q6 hours prn								13)	Percocet 1 - 2 tab po q4 hours prn							14)	Zofran prn										15)	Albuterol 2.5 mg via nebulizer q6 hours							16)	Heparin 5,000 units sq q12 hours							17)	NPO except medications with sips / chips.						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 She is stable. Plan to:    1)	Admit her to the SICU (done).								2)	Clarify medication orders. No need for parenteral fluid and Lasix concurrently. Will indicate reason for each medication and provide hold parameters for antihypertensive medications.											3)	Commence enteral feedings. Will commence tube feedings and plan for an oral diet in the near future.											4)	Change the aspirin and percocet to administration via the NGT, not orally. Will give oxycodone not percocet and will give as a solution / liquid.					5)	Change the heparin to every 8 hours as opposed to q12 hour dosing.			6)	Commence treatment with a statin. Has had a prior CVA (as evidenced by prior CT scan of her brain) and has coronary artery disease. Unclear as to why she was not being treated with a statin.												7)	Allow and assist her to be out of bed.							8)	Will obtain a physical therapy as well as a speech therapy consultation.			9)	Plan expeditious transfer to the otolaryngology floor.					10)	Full support in place.    Viktor Castro May 17th, 2017  10:40 PM    Day of Hospitalization  Day of Operation  Day of SICU Admission  Initial SICU Evaluation    I was asked to evaluate this patient, render a Surgical Critical Care Consultation and to provide ongoing care and monitoring for this patient. Her chart is reviewed and she is examined.    This patient is a 71-year-old obese, hypertensive, insulin requiring type 2 diabetic female who presented to the Otolaryngology service for elective surgery. Approximately one year ago she had been noted to have a mass on her tongue that intermittently bleed. A biopsy reportedly revealed squamous cell carcinoma prompting the current hospitalization. A PET scan, obtained on 5/15/17 revealed right axillary and retropectoral lymph nodes that were avid for FDG and she had activity in the left side of her tongue. There was no definite FDG avid cervical lymphadenopathy.    She has a medical history significant for having hypertension, hypothyroidism, chronic obstructive pulmonary disease, insulin requiring type 2 diabetes mellitus, obesity, and obstructive sleep apnea. 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 She has chronic diastolic heart failure and is known to have moderate pulmonary hypertension. She has had Parkinson's diease and has had TIA's and a right occipital infarction. She has anxiety and depression. She previously she had a detachment of her left retina and has had bilateral lens implants for cataracts. She had an bioprosthetic aortic valve replacement and she has had coronary artery stents inserted in 2013 (into a mid LAD lesion and a proximal and mid right coronary artery.) She has had a cholecystectomy, a hysterectomy, an appendectomy, and bilateral shoulder surgery for rotator cuff injuries. She has had a lumbar fusion on 5/18/10 at [facility]. On 11/22/14 she was evaluated in the ED at [facility] with complaints of having a cough and dyspnea. Because she had a systolic murmur, on 5/1/15 she was hospitalized on the Medical Service at [facility] for a Trans-esophageal echocardiogram. She was found to have aortic stenosis and was hospitalized on the Cardiac Surgery Surgery Service at [facility] from 6/10/15 - 6/12/15 for placement of a TAVR. She had a 23 mm Bliss Rafia valve inserted. She was again evaluated in the ED at [facility] on 10/19/15 when she had complaints of having dyspnea. Apparently the patient left the ED prior to being evaluated. She was hospitalized on the medical service at [facility] from 4/2/16 - 4/6/16 for evaluation and treatment of weakness and loose bowel movements. While hospitalized her diarrhea resolved and she was discharged to home. She was hospitalized on the Medical Service at [facility] from 2/11/17 - 2/15/17 for evaluation and treatment of syncope. She had a "slight troponin leak" and was treated medically. An EEG failed to reveal evidence of the presence of seizures. A transthoracic echocardiogram, obtained on 2/14/17 revealed a global left ventricular ejection fraction of 60% - 65% with grade II diastolic dysfunction. The right ventricular systolic pressures were estimated to be 52 mm Hg, consistent with moderate pulmonary hypertension. Prior to the current hospitalization she took:    1)	Aspirin		2)	Plavix		3)	Lopressor	4)	Losartan                5)	Lasix		6)	Glucophage	7)	Levemir		8)	Synthroid	              9)	Sinemet	10)	Neupro		11)	Albuterol	12)	Xanax                    13)	Zoloft		14)	Lamotrigine	15)	Motrin		16)	Gabapentin                             17)	Vitamin D	18)	Potassium  	  She uses BIPAP nocturnally. She is reportedly allergic to penicillin that causes angioedema and ultram that results in a rash. She is intolerant of morphine. She is reportedly allergic to latex. She does not abuse ethanol and she smoked 1 pack of cigarettes per day for 10 years stopping 30 years ago. Her family history is significant for her father having had cirrhosis. She is  and resides with her spouse, Carmelo France [phone] / [phone] in a private home in [place]. There are four stairs to traverse to enter the resident and there are 10 steps to the bedroom. The patient has an adult daughter, Amelie Elizabeth ([phone]). The patient's daughter and the patient's son-in-law reside upstairs in the same residence as the patient. The patient has previously assigned her spouse to be her healthcare agent / proxy although at other times the patient assigned her daughter to be her healthcare agent / proxy . She has decreased vision and uses corrective lenses. She commonly ambulates with the assistance of a rolling walker and / or a cane. Anesthetics have previously resulted in hallucinations. She denied having had headaches, dizziness, or photophobia. She has not had chest pain, or palpitations but she has dyspnea when climbing a flight of stairs. She denied having had abdominal pain, nausea or emesis, diarrhea or constipation, fever or chills. She has not been jaundiced nor has she had dark urine. She has had post-nasal drip and denied having had bleeding from any area other than her tongue. She has had joint pain, mostly in her back. She has depression and anxiety and denied having had suicidal ideation. Her 10-point review of systems was otherwise negative. Her internist is Dr. Orlin Lucero ([phone]). The patient's cardiologist is Dr. Engel ([phone]).    On presentation to PAT / PST she had a:    BP	=	120/60		P	=	82	R	=	18	             	Temp	=	37.4		O2 Sat	=	96%	VAS	=	-    She was awake, alert, and fully oriented. She was in no acute distress and did not appear toxic.  She was anicteric. She had reactive pupils and her extra-occular movements were preserved.  She did not have thrush. She had a mass on the left side of her tongue. She did not have JVD.  Her lungs were clear and she had non-labored respirations. She had symmetrical chest wall movements.  She had regular heart tones. She did not have a murmur.  Her abdomen was soft and neither distended nor tender. She was obese. She did not have guarding or rebound. There were no palpable masses or abdominal wall hernias. She had healed surgical scars. There was no CVA tenderness and she had active bowel sounds.  Her extremities were well perfused. She did not have a rash.  She had a healed posterior lumbar surgical scar. She had normal strength. Her musculoskeletal exam was otherwise normal.    Laboratory tests revealed a:    WBC	=	6	Na		=	142	Bilirubin		=	0.9  Neutro	=	-	K		=	3.4	Alk Phos	=	113  Hgb	=	9	Cl		=	104	SGOT		=	22  Hct	=	32%	HCO3		=	21	SGPT		=	6  Plts	=	252	Glucose	=	207  			BUN		=	14	Hemoglobin A1-C	=	7.4  PT	=	-	Creat		=	0.5  PTT	=	-  INR	=	-	Albumin	=	4.0    An EKG revealed NSR. She had inverted "T" waves in lead aVR and aVL.    She was electively admitted to the Otolaryngology service and she underwent anesthesia and surgery commencing at approximately 12:30 PM on 5/17/17. She was ASA 3 and underwent general anesthesia via a 7.0 naso-tracheal tube that was easy to insert. Attempts were made to place a radial arterial cannula. A 16 Fr Lange catheter was inserted. During the approximately 6 hours of anesthesia she was given 2,500 ml of crystalloid and one unit (300 ml) of pRBC. She produced 40 ml of urine and was estimated to have lost 100 ml of blood. During the anesthesia she required vasopressor support with ephedrine and norsynephrine. She was given Lasix. Laboratory tests, obtained at approximately 1 PM, during the anesthesia revealed a:    Na		=	139	Hct	=	29%  K		=	3.6  Glucose	=	114    Prior to commencing the operation she was given 600 mg of clindamycin. She underwent a left glossectomy, left neck dissection and tracheostomy. On completion of the anesthesia and surgery she was transferred to the PACU. During the approximately one hour she was in the PACU she had a:    BP	= 134 - 148/45 - 65	P	= 60 - 67		R	= 12 - 17  Temp	= 37			O2 Sat	= 98% - 100%    She was given aspirin, heparin sq, albuterol, IV fluid, xanax, zoloft, Lamictal, Sinemet, Lasix, Insulin, Lopressor, Losartan, Tylenol, Percocet, and Dilaudid. She was subsequently transferred to the SICU where she is now seen.    While in the SICU she has had a:    BP	=	113 - 152/44 - 65  P	=	60 - 80		O2 Sat	=	94% - 100%  R	=	12 - 19		I/O	=	300 in/440 out (5/17 - 5/18 < 24 hours)  Temp	=	36.4 - 37.2			CRISTIAN = 63 ml    Glucose		=	192	Weight		=	83.5 Kg  					BMI		=	38.5    Awake, alert, and interactive. Communicates effectively. In no distress. Anicteric. Pupils reactive with post-operative changes. Extra-occular movements intact.  Small bore NGT in place.  No thrush. Tongue with operative changes. No bleeding.  Tracheostomy in place with some leonie-tracheal bloody secretions. CRISTIAN sero-sanguinous.  Lungs with scattered rhonchi. Clear with coughing.  COR - RRR without a murmur.  Abdomen obese. Neither tender nor distended.  Extremities well perfused.    WBC	=	13  Neutro	=	-  Hgb	=	11  Hct	=	37%  Plts	=	259    A chest radiograph revealed the presence of a small bore NGT that ended below the diaphragm. She had a tracheostomy in place and she had a TAVR. She had bilateral shoulder prosthesis.    Remains on:    1)	NS @ 50 ml/hr										              2)	Lasix 40 mg via NGT q24 hours  3)	Lopressor 25 mg via NGT q12 hours					                            4)	Losartan 20 mg via NGT q24 hours						5)	Aspirin 81 mg po q24 hours								6)	Synthroid 25 micrograms via NGT q24 hours						7)	Zoloft 25 mg via NGT q24 hours								8)	Lamictal 100 mg via NGT q24 hours							9)	Xanax 1 mg via NGT qhs								10)	Sinemet 25/100 via NGT qid								11)	Insulin  	a)	Lantus 30 units sq qAM  	b)	Sliding scale  12)	Tylenol 650 mg po q6 hours prn								13)	Percocet 1 - 2 tab po q4 hours prn							14)	Zofran prn										15)	Albuterol 2.5 mg via nebulizer q6 hours							16)	Heparin 5,000 units sq q12 hours							17)	NPO except medications with sips / chips.						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 She is stable. Plan to:    1)	Admit her to the SICU (done).								2)	Clarify medication orders. No need for parenteral fluid and Lasix concurrently. Will indicate reason for each medication and provide hold parameters for antihypertensive medications.											3)	Commence enteral feedings. Will commence tube feedings and plan for an oral diet in the near future.											4)	Change the aspirin and percocet to administration via the NGT, not orally. Will give oxycodone not percocet and will give as a solution / liquid.					5)	Change the heparin to every 8 hours as opposed to q12 hour dosing.			6)	Commence treatment with a statin. Has had a prior CVA (as evidenced by prior CT scan of her brain) and has coronary artery disease. Unclear as to why she was not being treated with a statin.												7)	Allow and assist her to be out of bed.							8)	Will obtain a physical therapy as well as a speech therapy consultation.			9)	Plan expeditious transfer to the otolaryngology floor.					10)	Full support in place.    Viktor Castro May 17th, 2017  10:40 PM    Day of Hospitalization  Day of Operation  Day of SICU Admission  Initial SICU Evaluation    I was asked to evaluate this patient, render a Surgical Critical Care Consultation and to provide ongoing care and monitoring for this patient. Her chart is reviewed and she is examined.    This patient is a 71-year-old obese, hypertensive, insulin requiring type 2 diabetic female who presented to the Otolaryngology service for elective surgery. Approximately one year ago she had been noted to have a mass on her tongue that intermittently bleed. A biopsy reportedly revealed squamous cell carcinoma prompting the current hospitalization. A PET scan, obtained on 5/15/17 revealed right axillary and retropectoral lymph nodes that were avid for FDG and she had activity in the left side of her tongue. There was no definite FDG avid cervical lymphadenopathy.    She has a medical history significant for having hypertension, hypothyroidism, chronic obstructive pulmonary disease, insulin requiring type 2 diabetes mellitus, obesity, and obstructive sleep apnea. 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 She has chronic diastolic heart failure and is known to have moderate pulmonary hypertension. She has had Parkinson's diease and has had TIA's and a right occipital infarction. She has anxiety and depression. She previously she had a detachment of her left retina and has had bilateral lens implants for cataracts. She had an bioprosthetic aortic valve replacement and she has had coronary artery stents inserted in 2013 (into a mid LAD lesion and a proximal and mid right coronary artery.) She has had a cholecystectomy, a hysterectomy, an appendectomy, and bilateral shoulder surgery for rotator cuff injuries. She has had a lumbar fusion on 5/18/10 at [facility]. On 11/22/14 she was evaluated in the ED at [facility] with complaints of having a cough and dyspnea. Because she had a systolic murmur, on 5/1/15 she was hospitalized on the Medical Service at [facility] for a Trans-esophageal echocardiogram. She was found to have aortic stenosis and was hospitalized on the Cardiac Surgery Surgery Service at [facility] from 6/10/15 - 6/12/15 for placement of a TAVR. She had a 23 mm Bliss Rafia valve inserted. She was again evaluated in the ED at [facility] on 10/19/15 when she had complaints of having dyspnea. Apparently the patient left the ED prior to being evaluated. She was hospitalized on the medical service at [facility] from 4/2/16 - 4/6/16 for evaluation and treatment of weakness and loose bowel movements. While hospitalized her diarrhea resolved and she was discharged to home. She was hospitalized on the Medical Service at [facility] from 2/11/17 - 2/15/17 for evaluation and treatment of syncope. She had a "slight troponin leak" and was treated medically. An EEG failed to reveal evidence of the presence of seizures. A transthoracic echocardiogram, obtained on 2/14/17 revealed a global left ventricular ejection fraction of 60% - 65% with grade II diastolic dysfunction. The right ventricular systolic pressures were estimated to be 52 mm Hg, consistent with moderate pulmonary hypertension. Prior to the current hospitalization she took:    1)	Aspirin		2)	Plavix		3)	Lopressor	4)	Losartan                5)	Lasix		6)	Glucophage	7)	Levemir		8)	Synthroid	              9)	Sinemet	10)	Neupro		11)	Albuterol	12)	Xanax                    13)	Zoloft		14)	Lamotrigine	15)	Motrin		16)	Gabapentin                             17)	Vitamin D	18)	Potassium  	  She uses BIPAP nocturnally. She is reportedly allergic to penicillin that causes angioedema and ultram that results in a rash. She is intolerant of morphine. She is reportedly allergic to latex. She does not abuse ethanol and she smoked 1 pack of cigarettes per day for 10 years stopping 30 years ago. Her family history is significant for her father having had cirrhosis. She is  and resides with her spouse, Carmelo France [phone] / [phone] in a private home in [place]. There are four stairs to traverse to enter the resident and there are 10 steps to the bedroom. The patient has an adult daughter, Amelie Elizabeth ([phone]). The patient's daughter and the patient's son-in-law reside upstairs in the same residence as the patient. The patient has previously assigned her spouse to be her healthcare agent / proxy although at other times the patient assigned her daughter to be her healthcare agent / proxy . She has decreased vision and uses corrective lenses. She commonly ambulates with the assistance of a rolling walker and / or a cane. Anesthetics have previously resulted in hallucinations. She denied having had headaches, dizziness, or photophobia. She has not had chest pain, or palpitations but she has dyspnea when climbing a flight of stairs. She denied having had abdominal pain, nausea or emesis, diarrhea or constipation, fever or chills. She has not been jaundiced nor has she had dark urine. She has had post-nasal drip and denied having had bleeding from any area other than her tongue. She has had joint pain, mostly in her back. She has depression and anxiety and denied having had suicidal ideation. Her 10-point review of systems was otherwise negative. Her internist is Dr. Orlin Lucero ([phone]). The patient's cardiologist is Dr. Engel ([phone]).    On presentation to PAT / PST she had a:    BP	=	120/60		P	=	82	R	=	18             	Temp	=	37.4		O2 Sat	=	96%	VAS	=	-    She was awake, alert, and fully oriented. She was in no acute distress and did not appear toxic.  She was anicteric. She had reactive pupils and her extra-occular movements were preserved.  She did not have thrush. She had a mass on the left side of her tongue. She did not have JVD.  Her lungs were clear and she had non-labored respirations. She had symmetrical chest wall movements.  She had regular heart tones. She did not have a murmur.  Her abdomen was soft and neither distended nor tender. She was obese. She did not have guarding or rebound. There were no palpable masses or abdominal wall hernias. She had healed surgical scars. There was no CVA tenderness and she had active bowel sounds.  Her extremities were well perfused. She did not have a rash.  She had a healed posterior lumbar surgical scar. She had normal strength. Her musculoskeletal exam was otherwise normal.    Laboratory tests revealed a:    WBC	=	6	Na		=	142	Bilirubin		=	0.9  Neutro	=	-	K		=	3.4	Alk Phos	=	113  Hgb	=	9	Cl		=	104	SGOT		=	22  Hct	=	32%	HCO3		=	21	SGPT		=	6  Plts	=	252	Glucose	=	207  			BUN		=	14	Hemoglobin A1-C	=	7.4  PT	=	-	Creat		=	0.5  PTT	=	-  INR	=	-	Albumin	=	4.0    An EKG revealed NSR. She had inverted "T" waves in lead aVR and aVL.    She was electively admitted to the Otolaryngology service and she underwent anesthesia and surgery commencing at approximately 12:30 PM on 5/17/17. She was ASA 3 and underwent general anesthesia via a 7.0 naso-tracheal tube that was easy to insert. Attempts were made to place a radial arterial cannula. A 16 Fr Lange catheter was inserted. During the approximately 6 hours of anesthesia she was given 2,500 ml of crystalloid and one unit (300 ml) of pRBC. She produced 40 ml of urine and was estimated to have lost 100 ml of blood. During the anesthesia she required vasopressor support with ephedrine and norsynephrine. She was given Lasix. Laboratory tests, obtained at approximately 1 PM, during the anesthesia revealed a:    Na		=	139	Hct	=	29%  K		=	3.6  Glucose	=	114    Prior to commencing the operation she was given 600 mg of clindamycin. She underwent a left glossectomy, left neck dissection and tracheostomy. On completion of the anesthesia and surgery she was transferred to the PACU. During the approximately one hour she was in the PACU she had a:    BP	= 134 - 148/45 - 65	P	= 60 - 67		R	= 12 - 17  Temp	= 37			O2 Sat	= 98% - 100%    She was given aspirin, heparin sq, albuterol, IV fluid, xanax, zoloft, Lamictal, Sinemet, Lasix, Insulin, Lopressor, Losartan, Tylenol, Percocet, and Dilaudid. She was subsequently transferred to the SICU where she is now seen.    While in the SICU she has had a:    BP	=	113 - 152/44 - 65  P	=	60 - 80		O2 Sat	=	94% - 100%  R	=	12 - 19		I/O	=	300 in/440 out (5/17 - 5/18 < 24 hours)  Temp	=	36.4 - 37.2			CRISTIAN = 63 ml    Glucose		=	192	Weight		=	83.5 Kg  					BMI		=	38.5    Awake, alert, and interactive. Communicates effectively. In no distress. Anicteric. Pupils reactive with post-operative changes. Extra-occular movements intact.  Small bore NGT in place.  No thrush. Tongue with operative changes. No bleeding.  Tracheostomy in place with some leonie-tracheal bloody secretions. CRISTIAN sero-sanguinous.  Lungs with scattered rhonchi. Clear with coughing.  COR - RRR without a murmur.  Abdomen obese. Neither tender nor distended.  Extremities well perfused.    WBC	=	13  Neutro	=	-  Hgb	=	11  Hct	=	37%  Plts	=	259    A chest radiograph revealed the presence of a small bore NGT that ended below the diaphragm. She had a tracheostomy in place and she had a TAVR. She had bilateral shoulder prosthesis.    Remains on:    1)	NS @ 50 ml/hr										              2)	Lasix 40 mg via NGT q24 hours  3)	Lopressor 25 mg via NGT q12 hours					                            4)	Losartan 20 mg via NGT q24 hours						5)	Aspirin 81 mg po q24 hours								6)	Synthroid 25 micrograms via NGT q24 hours						7)	Zoloft 25 mg via NGT q24 hours								8)	Lamictal 100 mg via NGT q24 hours							9)	Xanax 1 mg via NGT qhs								10)	Sinemet 25/100 via NGT qid								11)	Insulin  	a)	Lantus 30 units sq qAM  	b)	Sliding scale  12)	Tylenol 650 mg po q6 hours prn								13)	Percocet 1 - 2 tab po q4 hours prn							14)	Zofran prn										15)	Albuterol 2.5 mg via nebulizer q6 hours							16)	Heparin 5,000 units sq q12 hours							17)	NPO except medications with sips / chips.						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 She is stable. Plan to:    1)	Admit her to the SICU (done).								2)	Clarify medication orders. No need for parenteral fluid and Lasix concurrently. Will indicate reason for each medication and provide hold parameters for antihypertensive medications.											3)	Commence enteral feedings. Will commence tube feedings and plan for an oral diet in the near future.											4)	Change the aspirin and percocet to administration via the NGT, not orally. Will give oxycodone not percocet and will give as a solution / liquid.					5)	Change the heparin to every 8 hours as opposed to q12 hour dosing.			6)	Commence treatment with a statin. Has had a prior CVA (as evidenced by prior CT scan of her brain) and has coronary artery disease. Unclear as to why she was not being treated with a statin.												7)	Allow and assist her to be out of bed.							8)	Will obtain a physical therapy as well as a speech therapy consultation.			9)	Plan expeditious transfer to the otolaryngology floor.					10)	Full support in place.    Viktor Castro May 17th, 2017  10:40 PM    Day of Hospitalization  Day of Operation  Day of SICU Admission  Initial SICU Evaluation    I was asked to evaluate this patient, render a Surgical Critical Care Consultation and to provide ongoing care and monitoring for this patient. Her chart is reviewed and she is examined.    This patient is a 71-year-old obese, hypertensive, insulin requiring type 2 diabetic female who presented to the Otolaryngology service for elective surgery. Approximately one year ago she had been noted to have a mass on her tongue that intermittently bleed. A biopsy reportedly revealed squamous cell carcinoma prompting the current hospitalization. A PET scan, obtained on 5/15/17 revealed right axillary and retropectoral lymph nodes that were avid for FDG and she had activity in the left side of her tongue. There was no definite FDG avid cervical lymphadenopathy.    She has a medical history significant for having hypertension, hypothyroidism, chronic obstructive pulmonary disease, insulin requiring type 2 diabetes mellitus, obesity, and obstructive sleep apnea. 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 She has chronic diastolic heart failure and is known to have moderate pulmonary hypertension. She has had Parkinson's diease and has had TIA's and a right occipital infarction. She has anxiety and depression. She previously she had a detachment of her left retina and has had bilateral lens implants for cataracts. She had an bioprosthetic aortic valve replacement and she has had coronary artery stents inserted in 2013 (into a mid LAD lesion and a proximal and mid right coronary artery.) She has had a cholecystectomy, a hysterectomy, an appendectomy, and bilateral shoulder surgery for rotator cuff injuries. She has had a lumbar fusion on 5/18/10 at [facility]. On 11/22/14 she was evaluated in the ED at [facility] with complaints of having a cough and dyspnea. Because she had a systolic murmur, on 5/1/15 she was hospitalized on the Medical Service at [facility] for a Trans-esophageal echocardiogram. She was found to have aortic stenosis and was hospitalized on the Cardiac Surgery Surgery Service at [facility] from 6/10/15 - 6/12/15 for placement of a TAVR. She had a 23 mm Bliss Rafia valve inserted. She was again evaluated in the ED at [facility] on 10/19/15 when she had complaints of having dyspnea. Apparently the patient left the ED prior to being evaluated. She was hospitalized on the medical service at [facility] from 4/2/16 - 4/6/16 for evaluation and treatment of weakness and loose bowel movements. While hospitalized her diarrhea resolved and she was discharged to home. She was hospitalized on the Medical Service at [facility] from 2/11/17 - 2/15/17 for evaluation and treatment of syncope. She had a "slight troponin leak" and was treated medically. An EEG failed to reveal evidence of the presence of seizures. A transthoracic echocardiogram, obtained on 2/14/17 revealed a global left ventricular ejection fraction of 60% - 65% with grade II diastolic dysfunction. The right ventricular systolic pressures were estimated to be 52 mm Hg, consistent with moderate pulmonary hypertension. Prior to the current hospitalization she took:    1)	Aspirin		2)	Plavix		3)	Lopressor	4)	Losartan                5)	Lasix		6)	Glucophage	7)	Levemir		8)	Synthroid	              9)	Sinemet	10)	Neupro		11)	Albuterol	12)	Xanax                    13)	Zoloft		14)	Lamotrigine	15)	Motrin		16)	Gabapentin                             17)	Vitamin D	18)	Potassium  	  She uses BIPAP nocturnally. She is reportedly allergic to penicillin that causes angioedema and ultram that results in a rash. She is intolerant of morphine. She is reportedly allergic to latex. She does not abuse ethanol and she smoked 1 pack of cigarettes per day for 10 years stopping 30 years ago. Her family history is significant for her father having had cirrhosis. She is  and resides with her spouse, Carmelo France [phone] / [phone] in a private home in [place]. There are four stairs to traverse to enter the resident and there are 10 steps to the bedroom. The patient has an adult daughter, Amelie Elizabeth ([phone]). The patient's daughter and the patient's son-in-law reside upstairs in the same residence as the patient. The patient has previously assigned her spouse to be her healthcare agent / proxy although at other times the patient assigned her daughter to be her healthcare agent / proxy . She has decreased vision and uses corrective lenses. She commonly ambulates with the assistance of a rolling walker and / or a cane. Anesthetics have previously resulted in hallucinations. She denied having had headaches, dizziness, or photophobia. She has not had chest pain, or palpitations but she has dyspnea when climbing a flight of stairs. She denied having had abdominal pain, nausea or emesis, diarrhea or constipation, fever or chills. She has not been jaundiced nor has she had dark urine. She has had post-nasal drip and denied having had bleeding from any area other than her tongue. She has had joint pain, mostly in her back. She has depression and anxiety and denied having had suicidal ideation. Her 10-point review of systems was otherwise negative. Her internist is Dr. Orlin Lucero ([phone]). The patient's cardiologist is Dr. Engel ([phone]).    On presentation to PAT / PST she had a:    BP	=	120/60		P	=	82	R	=	18                         	Temp	=	37.4		O2 Sat	=	96%	VAS	=	-    She was awake, alert, and fully oriented. She was in no acute distress and did not appear toxic.  She was anicteric. She had reactive pupils and her extra-occular movements were preserved.  She did not have thrush. She had a mass on the left side of her tongue. She did not have JVD.  Her lungs were clear and she had non-labored respirations. She had symmetrical chest wall movements.  She had regular heart tones. She did not have a murmur.  Her abdomen was soft and neither distended nor tender. She was obese. She did not have guarding or rebound. There were no palpable masses or abdominal wall hernias. She had healed surgical scars. There was no CVA tenderness and she had active bowel sounds.  Her extremities were well perfused. She did not have a rash.  She had a healed posterior lumbar surgical scar. She had normal strength. Her musculoskeletal exam was otherwise normal.    Laboratory tests revealed a:    WBC	=	6	Na		=	142	Bilirubin		=	0.9  Neutro	=	-	K		=	3.4	Alk Phos	=	113  Hgb	=	9	Cl		=	104	SGOT		=	22  Hct	=	32%	HCO3		=	21	SGPT		=	6  Plts	=	252	Glucose	=	207  			BUN		=	14	Hemoglobin A1-C	=	7.4  PT	=	-	Creat		=	0.5  PTT	=	-  INR	=	-	Albumin	=	4.0    An EKG revealed NSR. She had inverted "T" waves in lead aVR and aVL.    She was electively admitted to the Otolaryngology service and she underwent anesthesia and surgery commencing at approximately 12:30 PM on 5/17/17. She was ASA 3 and underwent general anesthesia via a 7.0 naso-tracheal tube that was easy to insert. Attempts were made to place a radial arterial cannula. A 16 Fr Lange catheter was inserted. During the approximately 6 hours of anesthesia she was given 2,500 ml of crystalloid and one unit (300 ml) of pRBC. She produced 40 ml of urine and was estimated to have lost 100 ml of blood. During the anesthesia she required vasopressor support with ephedrine and norsynephrine. She was given Lasix. Laboratory tests, obtained at approximately 1 PM, during the anesthesia revealed a:    Na		=	139	Hct	=	29%  K		=	3.6  Glucose	=	114    Prior to commencing the operation she was given 600 mg of clindamycin. She underwent a left glossectomy, left neck dissection and tracheostomy. On completion of the anesthesia and surgery she was transferred to the PACU. During the approximately one hour she was in the PACU she had a:    BP	= 134 - 148/45 - 65	P	= 60 - 67		R	= 12 - 17  Temp	= 37			O2 Sat	= 98% - 100%    She was given aspirin, heparin sq, albuterol, IV fluid, xanax, zoloft, Lamictal, Sinemet, Lasix, Insulin, Lopressor, Losartan, Tylenol, Percocet, and Dilaudid. She was subsequently transferred to the SICU where she is now seen.    While in the SICU she has had a:    BP	=	113 - 152/44 - 65  P	=	60 - 80		O2 Sat	=	94% - 100%  R	=	12 - 19		I/O	=	300 in/440 out (5/17 - 5/18 < 24 hours)  Temp	=	36.4 - 37.2			CRISTIAN = 63 ml    Glucose		=	192	Weight		=	83.5 Kg  					BMI		=	38.5    Awake, alert, and interactive. Communicates effectively. In no distress. Anicteric. Pupils reactive with post-operative changes. Extra-occular movements intact.  Small bore NGT in place.  No thrush. Tongue with operative changes. No bleeding.  Tracheostomy in place with some leonie-tracheal bloody secretions. CRISTIAN sero-sanguinous.  Lungs with scattered rhonchi. Clear with coughing.  COR - RRR without a murmur.  Abdomen obese. Neither tender nor distended.  Extremities well perfused.    WBC	=	13  Neutro	=	-  Hgb	=	11  Hct	=	37%  Plts	=	259    A chest radiograph revealed the presence of a small bore NGT that ended below the diaphragm. She had a tracheostomy in place and she had a TAVR. She had bilateral shoulder prosthesis.    Remains on:    1)	NS @ 50 ml/hr										              2)	Lasix 40 mg via NGT q24 hours  3)	Lopressor 25 mg via NGT q12 hours					                            4)	Losartan 20 mg via NGT q24 hours						5)	Aspirin 81 mg po q24 hours								6)	Synthroid 25 micrograms via NGT q24 hours						7)	Zoloft 25 mg via NGT q24 hours								8)	Lamictal 100 mg via NGT q24 hours							9)	Xanax 1 mg via NGT qhs								10)	Sinemet 25/100 via NGT qid								11)	Insulin  	a)	Lantus 30 units sq qAM  	b)	Sliding scale  12)	Tylenol 650 mg po q6 hours prn								13)	Percocet 1 - 2 tab po q4 hours prn							14)	Zofran prn										15)	Albuterol 2.5 mg via nebulizer q6 hours							16)	Heparin 5,000 units sq q12 hours							17)	NPO except medications with sips / chips.						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 She is stable. Plan to:    1)	Admit her to the SICU (done).								2)	Clarify medication orders. No need for parenteral fluid and Lasix concurrently. Will indicate reason for each medication and provide hold parameters for antihypertensive medications.											3)	Commence enteral feedings. Will commence tube feedings and plan for an oral diet in the near future.											4)	Change the aspirin and percocet to administration via the NGT, not orally. Will give oxycodone not percocet and will give as a solution / liquid.					5)	Change the heparin to every 8 hours as opposed to q12 hour dosing.			6)	Commence treatment with a statin. Has had a prior CVA (as evidenced by prior CT scan of her brain) and has coronary artery disease. Unclear as to why she was not being treated with a statin.												7)	Allow and assist her to be out of bed.							8)	Will obtain a physical therapy as well as a speech therapy consultation.			9)	Plan expeditious transfer to the otolaryngology floor.					10)	Full support in place.    Viktor Castro May 17th, 2017  10:40 PM    Day of Hospitalization  Day of Operation  Day of SICU Admission  Initial SICU Evaluation    I was asked to evaluate this patient, render a Surgical Critical Care Consultation and to provide ongoing care and monitoring for this patient. Her chart is reviewed and she is examined.    This patient is a 71-year-old obese, hypertensive, insulin requiring type 2 diabetic female who presented to the Otolaryngology service for elective surgery. Approximately one year ago she had been noted to have a mass on her tongue that intermittently bleed. A biopsy reportedly revealed squamous cell carcinoma prompting the current hospitalization. A PET scan, obtained on 5/15/17 revealed right axillary and retropectoral lymph nodes that were avid for FDG and she had activity in the left side of her tongue. There was no definite FDG avid cervical lymphadenopathy.    She has a medical history significant for having hypertension, hypothyroidism, chronic obstructive pulmonary disease, insulin requiring type 2 diabetes mellitus, obesity, and obstructive sleep apnea. 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 She has chronic diastolic heart failure and is known to have moderate pulmonary hypertension. She has had Parkinson's diease and has had TIA's and a right occipital infarction. She has anxiety and depression. She previously she had a detachment of her left retina and has had bilateral lens implants for cataracts. She had an bioprosthetic aortic valve replacement and she has had coronary artery stents inserted in 2013 (into a mid LAD lesion and a proximal and mid right coronary artery.) She has had a cholecystectomy, a hysterectomy, an appendectomy, and bilateral shoulder surgery for rotator cuff injuries. She has had a lumbar fusion on 5/18/10 at [facility]. On 11/22/14 she was evaluated in the ED at [facility] with complaints of having a cough and dyspnea. Because she had a systolic murmur, on 5/1/15 she was hospitalized on the Medical Service at [facility] for a Trans-esophageal echocardiogram. She was found to have aortic stenosis and was hospitalized on the Cardiac Surgery Surgery Service at [facility] from 6/10/15 - 6/12/15 for placement of a TAVR. She had a 23 mm Bliss Rafia valve inserted. She was again evaluated in the ED at [facility] on 10/19/15 when she had complaints of having dyspnea. Apparently the patient left the ED prior to being evaluated. She was hospitalized on the medical service at [facility] from 4/2/16 - 4/6/16 for evaluation and treatment of weakness and loose bowel movements. While hospitalized her diarrhea resolved and she was discharged to home. She was hospitalized on the Medical Service at [facility] from 2/11/17 - 2/15/17 for evaluation and treatment of syncope. She had a "slight troponin leak" and was treated medically. An EEG failed to reveal evidence of the presence of seizures. A transthoracic echocardiogram, obtained on 2/14/17 revealed a global left ventricular ejection fraction of 60% - 65% with grade II diastolic dysfunction. The right ventricular systolic pressures were estimated to be 52 mm Hg, consistent with moderate pulmonary hypertension. Prior to the current hospitalization she took:    1)	Aspirin		2)	Plavix		3)	Lopressor	4)	Losartan                5)	Lasix		6)	Glucophage	7)	Levemir		8)	Synthroid	              9)	Sinemet	10)	Neupro		11)	Albuterol	12)	Xanax                    13)	Zoloft		14)	Lamotrigine	15)	Motrin		16)	Gabapentin                             17)	Vitamin D	18)	Potassium  	  She uses BIPAP nocturnally. She is reportedly allergic to penicillin that causes angioedema and ultram that results in a rash. She is intolerant of morphine. She is reportedly allergic to latex. She does not abuse ethanol and she smoked 1 pack of cigarettes per day for 10 years stopping 30 years ago. Her family history is significant for her father having had cirrhosis. She is  and resides with her spouse, Carmelo France [phone] / [phone] in a private home in [place]. There are four stairs to traverse to enter the resident and there are 10 steps to the bedroom. The patient has an adult daughter, Amelie Elizabeth ([phone]). The patient's daughter and the patient's son-in-law reside upstairs in the same residence as the patient. The patient has previously assigned her spouse to be her healthcare agent / proxy although at other times the patient assigned her daughter to be her healthcare agent / proxy . She has decreased vision and uses corrective lenses. She commonly ambulates with the assistance of a rolling walker and / or a cane. Anesthetics have previously resulted in hallucinations. She denied having had headaches, dizziness, or photophobia. She has not had chest pain, or palpitations but she has dyspnea when climbing a flight of stairs. She denied having had abdominal pain, nausea or emesis, diarrhea or constipation, fever or chills. She has not been jaundiced nor has she had dark urine. She has had post-nasal drip and denied having had bleeding from any area other than her tongue. She has had joint pain, mostly in her back. She has depression and anxiety and denied having had suicidal ideation. Her 10-point review of systems was otherwise negative. Her internist is Dr. Orlin Lucero ([phone]). The patient's cardiologist is Dr. Engel ([phone]).    On presentation to PAT / PST she had a:    BP	=	120/60		P	=	82	R	=	18		              Temp	=	37.4		O2 Sat	=	96%	VAS	=	-    She was awake, alert, and fully oriented. She was in no acute distress and did not appear toxic.  She was anicteric. She had reactive pupils and her extra-occular movements were preserved.  She did not have thrush. She had a mass on the left side of her tongue. She did not have JVD.  Her lungs were clear and she had non-labored respirations. She had symmetrical chest wall movements.  She had regular heart tones. She did not have a murmur.  Her abdomen was soft and neither distended nor tender. She was obese. She did not have guarding or rebound. There were no palpable masses or abdominal wall hernias. She had healed surgical scars. There was no CVA tenderness and she had active bowel sounds.  Her extremities were well perfused. She did not have a rash.  She had a healed posterior lumbar surgical scar. She had normal strength. Her musculoskeletal exam was otherwise normal.    Laboratory tests revealed a:    WBC	=	6	Na		=	142	Bilirubin		=	0.9  Neutro	=	-	K		=	3.4	Alk Phos	=	113  Hgb	=	9	Cl		=	104	SGOT		=	22  Hct	=	32%	HCO3		=	21	SGPT		=	6  Plts	=	252	Glucose	=	207  			BUN		=	14	Hemoglobin A1-C	=	7.4  PT	=	-	Creat		=	0.5  PTT	=	-  INR	=	-	Albumin	=	4.0    An EKG revealed NSR. She had inverted "T" waves in lead aVR and aVL.    She was electively admitted to the Otolaryngology service and she underwent anesthesia and surgery commencing at approximately 12:30 PM on 5/17/17. She was ASA 3 and underwent general anesthesia via a 7.0 naso-tracheal tube that was easy to insert. Attempts were made to place a radial arterial cannula. A 16 Fr Lange catheter was inserted. During the approximately 6 hours of anesthesia she was given 2,500 ml of crystalloid and one unit (300 ml) of pRBC. She produced 40 ml of urine and was estimated to have lost 100 ml of blood. During the anesthesia she required vasopressor support with ephedrine and norsynephrine. She was given Lasix. Laboratory tests, obtained at approximately 1 PM, during the anesthesia revealed a:    Na		=	139	Hct	=	29%  K		=	3.6  Glucose	=	114    Prior to commencing the operation she was given 600 mg of clindamycin. She underwent a left glossectomy, left neck dissection and tracheostomy. On completion of the anesthesia and surgery she was transferred to the PACU. During the approximately one hour she was in the PACU she had a:    BP	= 134 - 148/45 - 65	P	= 60 - 67		R	= 12 - 17  Temp	= 37			O2 Sat	= 98% - 100%    She was given aspirin, heparin sq, albuterol, IV fluid, xanax, zoloft, Lamictal, Sinemet, Lasix, Insulin, Lopressor, Losartan, Tylenol, Percocet, and Dilaudid. She was subsequently transferred to the SICU where she is now seen.    While in the SICU she has had a:    BP	=	113 - 152/44 - 65  P	=	60 - 80		O2 Sat	=	94% - 100%  R	=	12 - 19		I/O	=	300 in/440 out (5/17 - 5/18 < 24 hours)  Temp	=	36.4 - 37.2			CRISTIAN = 63 ml    Glucose		=	192	Weight		=	83.5 Kg  					BMI		=	38.5    Awake, alert, and interactive. Communicates effectively. In no distress. Anicteric. Pupils reactive with post-operative changes. Extra-occular movements intact.  Small bore NGT in place.  No thrush. Tongue with operative changes. No bleeding.  Tracheostomy in place with some leonie-tracheal bloody secretions. CRITSIAN sero-sanguinous.  Lungs with scattered rhonchi. Clear with coughing.  COR - RRR without a murmur.  Abdomen obese. Neither tender nor distended.  Extremities well perfused.    WBC	=	13  Neutro	=	-  Hgb	=	11  Hct	=	37%  Plts	=	259    A chest radiograph revealed the presence of a small bore NGT that ended below the diaphragm. She had a tracheostomy in place and she had a TAVR. She had bilateral shoulder prosthesis.    Remains on:    1)	NS @ 50 ml/hr										              2)	Lasix 40 mg via NGT q24 hours  3)	Lopressor 25 mg via NGT q12 hours					                            4)	Losartan 20 mg via NGT q24 hours						5)	Aspirin 81 mg po q24 hours								6)	Synthroid 25 micrograms via NGT q24 hours						7)	Zoloft 25 mg via NGT q24 hours								8)	Lamictal 100 mg via NGT q24 hours							9)	Xanax 1 mg via NGT qhs								10)	Sinemet 25/100 via NGT qid								11)	Insulin  	a)	Lantus 30 units sq qAM  	b)	Sliding scale  12)	Tylenol 650 mg po q6 hours prn								13)	Percocet 1 - 2 tab po q4 hours prn							14)	Zofran prn										15)	Albuterol 2.5 mg via nebulizer q6 hours							16)	Heparin 5,000 units sq q12 hours							17)	NPO except medications with sips / chips.						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 She is stable. Plan to:    1)	Admit her to the SICU (done).								2)	Clarify medication orders. No need for parenteral fluid and Lasix concurrently. Will indicate reason for each medication and provide hold parameters for antihypertensive medications.											3)	Commence enteral feedings. Will commence tube feedings and plan for an oral diet in the near future.											4)	Change the aspirin and percocet to administration via the NGT, not orally. Will give oxycodone not percocet and will give as a solution / liquid.					5)	Change the heparin to every 8 hours as opposed to q12 hour dosing.			6)	Commence treatment with a statin. Has had a prior CVA (as evidenced by prior CT scan of her brain) and has coronary artery disease. Unclear as to why she was not being treated with a statin.												7)	Allow and assist her to be out of bed.							8)	Will obtain a physical therapy as well as a speech therapy consultation.			9)	Plan expeditious transfer to the otolaryngology floor.					10)	Full support in place.    Viktor Castro May 17th, 2017  10:40 PM    Day of Hospitalization  Day of Operation  Day of SICU Admission  Initial SICU Evaluation    I was asked to evaluate this patient, render a Surgical Critical Care Consultation and to provide ongoing care and monitoring for this patient. Her chart is reviewed and she is examined.    This patient is a 71-year-old obese, hypertensive, insulin requiring type 2 diabetic female who presented to the Otolaryngology service for elective surgery. Approximately one year ago she had been noted to have a mass on her tongue that intermittently bleed. A biopsy reportedly revealed squamous cell carcinoma prompting the current hospitalization. A PET scan, obtained on 5/15/17 revealed right axillary and retropectoral lymph nodes that were avid for FDG and she had activity in the left side of her tongue. There was no definite FDG avid cervical lymphadenopathy.    She has a medical history significant for having hypertension, hypothyroidism, chronic obstructive pulmonary disease, insulin requiring type 2 diabetes mellitus, obesity, and obstructive sleep apnea. 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 She has chronic diastolic heart failure and is known to have moderate pulmonary hypertension. She has had Parkinson's diease and has had TIA's and a right occipital infarction. She has anxiety and depression. She previously she had a detachment of her left retina and has had bilateral lens implants for cataracts. She had an bioprosthetic aortic valve replacement and she has had coronary artery stents inserted in 2013 (into a mid LAD lesion and a proximal and mid right coronary artery.) She has had a cholecystectomy, a hysterectomy, an appendectomy, and bilateral shoulder surgery for rotator cuff injuries. She has had a lumbar fusion on 5/18/10 at [facility]. On 11/22/14 she was evaluated in the ED at [facility] with complaints of having a cough and dyspnea. Because she had a systolic murmur, on 5/1/15 she was hospitalized on the Medical Service at [facility] for a Trans-esophageal echocardiogram. She was found to have aortic stenosis and was hospitalized on the Cardiac Surgery Surgery Service at [facility] from 6/10/15 - 6/12/15 for placement of a TAVR. She had a 23 mm Bliss Rafia valve inserted. She was again evaluated in the ED at [facility] on 10/19/15 when she had complaints of having dyspnea. Apparently the patient left the ED prior to being evaluated. She was hospitalized on the medical service at [facility] from 4/2/16 - 4/6/16 for evaluation and treatment of weakness and loose bowel movements. While hospitalized her diarrhea resolved and she was discharged to home. She was hospitalized on the Medical Service at [facility] from 2/11/17 - 2/15/17 for evaluation and treatment of syncope. She had a "slight troponin leak" and was treated medically. An EEG failed to reveal evidence of the presence of seizures. A transthoracic echocardiogram, obtained on 2/14/17 revealed a global left ventricular ejection fraction of 60% - 65% with grade II diastolic dysfunction. The right ventricular systolic pressures were estimated to be 52 mm Hg, consistent with moderate pulmonary hypertension. Prior to the current hospitalization she took:    1)	Aspirin		2)	Plavix		3)	Lopressor	4)	Losartan                5)	Lasix		6)	Glucophage	7)	Levemir		8)	Synthroid	              9)	Sinemet	10)	Neupro		11)	Albuterol	12)	Xanax                    13)	Zoloft		14)	Lamotrigine	15)	Motrin		16)	Gabapentin                             17)	Vitamin D	18)	Potassium  	  She uses BIPAP nocturnally. She is reportedly allergic to penicillin that causes angioedema and ultram that results in a rash. She is intolerant of morphine. She is reportedly allergic to latex. She does not abuse ethanol and she smoked 1 pack of cigarettes per day for 10 years stopping 30 years ago. Her family history is significant for her father having had cirrhosis. She is  and resides with her spouse, Carmelo France [phone] / [phone] in a private home in [place]. There are four stairs to traverse to enter the resident and there are 10 steps to the bedroom. The patient has an adult daughter, Amelie Elizabeth ([phone]). The patient's daughter and the patient's son-in-law reside upstairs in the same residence as the patient. The patient has previously assigned her spouse to be her healthcare agent / proxy although at other times the patient assigned her daughter to be her healthcare agent / proxy . She has decreased vision and uses corrective lenses. She commonly ambulates with the assistance of a rolling walker and / or a cane. Anesthetics have previously resulted in hallucinations. She denied having had headaches, dizziness, or photophobia. She has not had chest pain, or palpitations but she has dyspnea when climbing a flight of stairs. She denied having had abdominal pain, nausea or emesis, diarrhea or constipation, fever or chills. She has not been jaundiced nor has she had dark urine. She has had post-nasal drip and denied having had bleeding from any area other than her tongue. She has had joint pain, mostly in her back. She has depression and anxiety and denied having had suicidal ideation. Her 10-point review of systems was otherwise negative. Her internist is Dr. Orlin Lucero ([phone]). The patient's cardiologist is Dr. Engel ([phone]).    On presentation to PAT / PST she had a:    BP	=	120/60		P	=	82	R	=	18		              Temp	=	37.4		O2 Sat	=	96%	VAS	=	-    She was awake, alert, and fully oriented. She was in no acute distress and did not appear toxic.  She was anicteric. She had reactive pupils and her extra-occular movements were preserved.  She did not have thrush. She had a mass on the left side of her tongue. She did not have JVD.  Her lungs were clear and she had non-labored respirations. She had symmetrical chest wall movements.  She had regular heart tones. She did not have a murmur.  Her abdomen was soft and neither distended nor tender. She was obese. She did not have guarding or rebound. There were no palpable masses or abdominal wall hernias. She had healed surgical scars. There was no CVA tenderness and she had active bowel sounds.  Her extremities were well perfused. She did not have a rash.  She had a healed posterior lumbar surgical scar. She had normal strength. Her musculoskeletal exam was otherwise normal.    Laboratory tests revealed a:    WBC	=	6	Na		=	142	Bilirubin		=	0.9  Neutro	=	-	K		=	3.4	Alk Phos	=	113  Hgb	=	9	Cl		=	104	SGOT		=	22  Hct	=	32%	HCO3		=	21	SGPT		=	6  Plts	=	252	Glucose	=	207  			BUN		=	14	Hemoglobin A1-C	=	7.4  PT	=	-	Creat		=	0.5  PTT	=	-  INR	=	-	Albumin	=	4.0    An EKG revealed NSR. She had inverted "T" waves in lead aVR and aVL.    She was electively admitted to the Otolaryngology service and she underwent anesthesia and surgery commencing at approximately 12:30 PM on 5/17/17. She was ASA 3 and underwent general anesthesia via a 7.0 naso-tracheal tube that was easy to insert. Attempts were made to place a radial arterial cannula. A 16 Fr Lange catheter was inserted. During the approximately 6 hours of anesthesia she was given 2,500 ml of crystalloid and one unit (300 ml) of pRBC. She produced 40 ml of urine and was estimated to have lost 100 ml of blood. During the anesthesia she required vasopressor support with ephedrine and norsynephrine. She was given Lasix. Laboratory tests, obtained at approximately 1 PM, during the anesthesia revealed a:    Na		=	139	Hct	=	29%  K		=	3.6  Glucose	=	114    Prior to commencing the operation she was given 600 mg of clindamycin. She underwent a left glossectomy, left neck dissection and tracheostomy. On completion of the anesthesia and surgery she was transferred to the PACU. During the approximately one hour she was in the PACU she had a:    BP	= 134 - 148/45 - 65	P	= 60 - 67		R	= 12 - 17  Temp	= 37			O2 Sat	= 98% - 100%    She was given aspirin, heparin sq, albuterol, IV fluid, xanax, zoloft, Lamictal, Sinemet, Lasix, Insulin, Lopressor, Losartan, Tylenol, Percocet, and Dilaudid. She was subsequently transferred to the SICU where she is now seen.    While in the SICU she has had a:    BP	=	113 - 152/44 - 65  P	=	60 - 80		O2 Sat	=	94% - 100%  R	=	12 - 19		I/O	=	300 in/440 out (5/17 - 5/18 < 24 hours)  Temp	=	36.4 - 37.2			CRISTIAN = 63 ml    Glucose		=	192		Weight		=	83.5 Kg  						BMI		=	38.5    Awake, alert, and interactive. Communicates effectively. In no distress. Anicteric. Pupils reactive with post-operative changes. Extra-occular movements intact.  Small bore NGT in place.  No thrush. Tongue with operative changes. No bleeding.  Tracheostomy in place with some leonie-tracheal bloody secretions. CRISTIAN sero-sanguinous.  Lungs with scattered rhonchi. Clear with coughing.  COR - RRR without a murmur.  Abdomen obese. Neither tender nor distended.  Extremities well perfused.    WBC	=	13  Neutro	=	-  Hgb	=	11  Hct	=	37%  Plts	=	259    A chest radiograph revealed the presence of a small bore NGT that ended below the diaphragm. She had a tracheostomy in place and she had a TAVR. She had bilateral shoulder prosthesis.    Remains on:    1)	NS @ 50 ml/hr										              2)	Lasix 40 mg via NGT q24 hours  3)	Lopressor 25 mg via NGT q12 hours					                            4)	Losartan 20 mg via NGT q24 hours				              		5)	Aspirin 81 mg po q24 hours								6)	Synthroid 25 micrograms via NGT q24 hours						7)	Zoloft 25 mg via NGT q24 hours								8)	Lamictal 100 mg via NGT q24 hours							9)	Xanax 1 mg via NGT qhs								10)	Sinemet 25/100 via NGT qid								11)	Insulin  	a)	Lantus 30 units sq qAM  	b)	Sliding scale  12)	Tylenol 650 mg po q6 hours prn								13)	Percocet 1 - 2 tab po q4 hours prn							14)	Zofran prn										15)	Albuterol 2.5 mg via nebulizer q6 hours							16)	Heparin 5,000 units sq q12 hours							17)	NPO except medications with sips / chips.						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 She is stable. Plan to:    1)	Admit her to the SICU (done).								2)	Clarify medication orders. No need for parenteral fluid and Lasix concurrently. Will indicate reason for each medication and provide hold parameters for antihypertensive medications.											3)	Commence enteral feedings. Will commence tube feedings and plan for an oral diet in the near future.											4)	Change the aspirin and percocet to administration via the NGT, not orally. Will give oxycodone not percocet and will give as a solution / liquid.					5)	Change the heparin to every 8 hours as opposed to q12 hour dosing.			6)	Commence treatment with a statin. Has had a prior CVA (as evidenced by prior CT scan of her brain) and has coronary artery disease. Unclear as to why she was not being treated with a statin.												7)	Allow and assist her to be out of bed.							8)	Will obtain a physical therapy as well as a speech therapy consultation.			9)	Plan expeditious transfer to the otolaryngology floor.					10)	Full support in place.    Viktor Castro May 17th, 2017  10:40 PM    Day of Hospitalization  Day of Operation  Day of SICU Admission  Initial SICU Evaluation    I was asked to evaluate this patient, render a Surgical Critical Care Consultation and to provide ongoing care and monitoring for this patient. Her chart is reviewed and she is examined.    This patient is a 71-year-old obese, hypertensive, insulin requiring type 2 diabetic female who presented to the Otolaryngology service for elective surgery. Approximately one year ago she had been noted to have a mass on her tongue that intermittently bleed. A biopsy reportedly revealed squamous cell carcinoma prompting the current hospitalization. A PET scan, obtained on 5/15/17 revealed right axillary and retropectoral lymph nodes that were avid for FDG and she had activity in the left side of her tongue. There was no definite FDG avid cervical lymphadenopathy.    She has a medical history significant for having hypertension, hypothyroidism, chronic obstructive pulmonary disease, insulin requiring type 2 diabetes mellitus, obesity, and obstructive sleep apnea. 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 She has chronic diastolic heart failure and is known to have moderate pulmonary hypertension. She has had Parkinson's diease and has had TIA's and a right occipital infarction. She has anxiety and depression. She previously she had a detachment of her left retina and has had bilateral lens implants for cataracts. She had an bioprosthetic aortic valve replacement and she has had coronary artery stents inserted in 2013 (into a mid LAD lesion and a proximal and mid right coronary artery.) She has had a cholecystectomy, a hysterectomy, an appendectomy, and bilateral shoulder surgery for rotator cuff injuries. She has had a lumbar fusion on 5/18/10 at [facility]. On 11/22/14 she was evaluated in the ED at [facility] with complaints of having a cough and dyspnea. Because she had a systolic murmur, on 5/1/15 she was hospitalized on the Medical Service at [facility] for a Trans-esophageal echocardiogram. She was found to have aortic stenosis and was hospitalized on the Cardiac Surgery Surgery Service at [facility] from 6/10/15 - 6/12/15 for placement of a TAVR. She had a 23 mm Bliss Rafia valve inserted. She was again evaluated in the ED at [facility] on 10/19/15 when she had complaints of having dyspnea. Apparently the patient left the ED prior to being evaluated. She was hospitalized on the medical service at [facility] from 4/2/16 - 4/6/16 for evaluation and treatment of weakness and loose bowel movements. While hospitalized her diarrhea resolved and she was discharged to home. She was hospitalized on the Medical Service at [facility] from 2/11/17 - 2/15/17 for evaluation and treatment of syncope. She had a "slight troponin leak" and was treated medically. An EEG failed to reveal evidence of the presence of seizures. A transthoracic echocardiogram, obtained on 2/14/17 revealed a global left ventricular ejection fraction of 60% - 65% with grade II diastolic dysfunction. The right ventricular systolic pressures were estimated to be 52 mm Hg, consistent with moderate pulmonary hypertension. Prior to the current hospitalization she took:    1)	Aspirin		2)	Plavix		3)	Lopressor	4)	Losartan                5)	Lasix		6)	Glucophage	7)	Levemir		8)	Synthroid	              9)	Sinemet	10)	Neupro		11)	Albuterol	12)	Xanax                    13)	Zoloft		14)	Lamotrigine	15)	Motrin		16)	Gabapentin                             17)	Vitamin D	18)	Potassium  	  She uses BIPAP nocturnally. She is reportedly allergic to penicillin that causes angioedema and ultram that results in a rash. She is intolerant of morphine. She is reportedly allergic to latex. She does not abuse ethanol and she smoked 1 pack of cigarettes per day for 10 years stopping 30 years ago. Her family history is significant for her father having had cirrhosis. She is  and resides with her spouse, Carmelo France [phone] / [phone] in a private home in [place]. There are four stairs to traverse to enter the resident and there are 10 steps to the bedroom. The patient has an adult daughter, Amelie Elizabeth ([phone]). The patient's daughter and the patient's son-in-law reside upstairs in the same residence as the patient. The patient has previously assigned her spouse to be her healthcare agent / proxy although at other times the patient assigned her daughter to be her healthcare agent / proxy . She has decreased vision and uses corrective lenses. She commonly ambulates with the assistance of a rolling walker and / or a cane. Anesthetics have previously resulted in hallucinations. She denied having had headaches, dizziness, or photophobia. She has not had chest pain, or palpitations but she has dyspnea when climbing a flight of stairs. She denied having had abdominal pain, nausea or emesis, diarrhea or constipation, fever or chills. She has not been jaundiced nor has she had dark urine. She has had post-nasal drip and denied having had bleeding from any area other than her tongue. She has had joint pain, mostly in her back. She has depression and anxiety and denied having had suicidal ideation. Her 10-point review of systems was otherwise negative. Her internist is Dr. Orlin Lucero ([phone]). The patient's cardiologist is Dr. Engel ([phone]).    On presentation to PAT / PST she had a:    BP	=	120/60		P	=	82	R	=	18		              Temp	=	37.4		O2 Sat	=	96%	VAS	=	-    She was awake, alert, and fully oriented. She was in no acute distress and did not appear toxic.  She was anicteric. She had reactive pupils and her extra-occular movements were preserved.  She did not have thrush. She had a mass on the left side of her tongue. She did not have JVD.  Her lungs were clear and she had non-labored respirations. She had symmetrical chest wall movements.  She had regular heart tones. She did not have a murmur.  Her abdomen was soft and neither distended nor tender. She was obese. She did not have guarding or rebound. There were no palpable masses or abdominal wall hernias. She had healed surgical scars. There was no CVA tenderness and she had active bowel sounds.  Her extremities were well perfused. She did not have a rash.  She had a healed posterior lumbar surgical scar. She had normal strength. Her musculoskeletal exam was otherwise normal.    Laboratory tests revealed a:    WBC	=	6	Na		=	142	Bilirubin		=	0.9  Neutro	=	-	K		=	3.4	Alk Phos	=	113  Hgb	=	9	Cl		=	104	SGOT		=	22  Hct	=	32%	HCO3		=	21	SGPT		=	6  Plts	=	252	Glucose	=	207  			BUN		=	14	Hemoglobin A1-C	=	7.4  PT	=	-	Creat		=	0.5  PTT	=	-  INR	=	-	Albumin	=	4.0    An EKG revealed NSR. She had inverted "T" waves in lead aVR and aVL.    She was electively admitted to the Otolaryngology service and she underwent anesthesia and surgery commencing at approximately 12:30 PM on 5/17/17. She was ASA 3 and underwent general anesthesia via a 7.0 naso-tracheal tube that was easy to insert. Attempts were made to place a radial arterial cannula. A 16 Fr Lange catheter was inserted. During the approximately 6 hours of anesthesia she was given 2,500 ml of crystalloid and one unit (300 ml) of pRBC. She produced 40 ml of urine and was estimated to have lost 100 ml of blood. During the anesthesia she required vasopressor support with ephedrine and norsynephrine. She was given Lasix. Laboratory tests, obtained at approximately 1 PM, during the anesthesia revealed a:    Na		=	139	Hct	=	29%  K		=	3.6  Glucose	=	114    Prior to commencing the operation she was given 600 mg of clindamycin. She underwent a left glossectomy, left neck dissection and tracheostomy. On completion of the anesthesia and surgery she was transferred to the PACU. During the approximately one hour she was in the PACU she had a:    BP	= 134 - 148/45 - 65	P	= 60 - 67		R	= 12 - 17  Temp	= 37			O2 Sat	= 98% - 100%    She was given aspirin, heparin sq, albuterol, IV fluid, xanax, zoloft, Lamictal, Sinemet, Lasix, Insulin, Lopressor, Losartan, Tylenol, Percocet, and Dilaudid. She was subsequently transferred to the SICU where she is now seen.    While in the SICU she has had a:    BP	=	113 - 152/44 - 65  P	=	60 - 80		O2 Sat	=	94% - 100%  R	=	12 - 19		I/O	=	300 in/440 out (5/17 - 5/18 < 24 hours)  Temp	=	36.4 - 37.2			CRISTIAN = 63 ml    Glucose		=	192		Weight		=	83.5 Kg  						BMI		=	38.5    Awake, alert, and interactive. Communicates effectively. In no distress. Anicteric. Pupils reactive with post-operative changes. Extra-occular movements intact.  Small bore NGT in place.  No thrush. Tongue with operative changes. No bleeding.  Tracheostomy in place with some leonie-tracheal bloody secretions. CRISTIAN sero-sanguinous.  Lungs with scattered rhonchi. Clear with coughing.  COR - RRR without a murmur.  Abdomen obese. Neither tender nor distended.  Extremities well perfused.    WBC	=	13  Neutro	=	-  Hgb	=	11  Hct	=	37%  Plts	=	259    A chest radiograph revealed the presence of a small bore NGT that ended below the diaphragm. She had a tracheostomy in place and she had a TAVR. She had bilateral shoulder prosthesis.    Remains on:    1)	NS @ 50 ml/hr										              2)	Lasix 40 mg via NGT q24 hours  3)	Lopressor 25 mg via NGT q12 hours					                            4)	Losartan 20 mg via NGT q24 hours				              	              	5)	Aspirin 81 mg po q24 hours								6)	Synthroid 25 micrograms via NGT q24 hours						7)	Zoloft 25 mg via NGT q24 hours								8)	Lamictal 100 mg via NGT q24 hours							9)	Xanax 1 mg via NGT qhs								10)	Sinemet 25/100 via NGT qid								11)	Insulin  	a)	Lantus 30 units sq qAM  	b)	Sliding scale  12)	Tylenol 650 mg po q6 hours prn								13)	Percocet 1 - 2 tab po q4 hours prn							14)	Zofran prn										15)	Albuterol 2.5 mg via nebulizer q6 hours							16)	Heparin 5,000 units sq q12 hours							17)	NPO except medications with sips / chips.						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 She is stable. Plan to:    1)	Admit her to the SICU (done).								2)	Clarify medication orders. No need for parenteral fluid and Lasix concurrently. Will indicate reason for each medication and provide hold parameters for antihypertensive medications.											3)	Commence enteral feedings. Will commence tube feedings and plan for an oral diet in the near future.											4)	Change the aspirin and percocet to administration via the NGT, not orally. Will give oxycodone not percocet and will give as a solution / liquid.					5)	Change the heparin to every 8 hours as opposed to q12 hour dosing.			6)	Commence treatment with a statin. Has had a prior CVA (as evidenced by prior CT scan of her brain) and has coronary artery disease. Unclear as to why she was not being treated with a statin.												7)	Allow and assist her to be out of bed.							8)	Will obtain a physical therapy as well as a speech therapy consultation.			9)	Plan expeditious transfer to the otolaryngology floor.					10)	Full support in place.    Viktor Castro May 17th, 2017  10:40 PM    Day of Hospitalization  Day of Operation  Day of SICU Admission  Initial SICU Evaluation    I was asked to evaluate this patient, render a Surgical Critical Care Consultation and to provide ongoing care and monitoring for this patient. Her chart is reviewed and she is examined.    This patient is a 71-year-old obese, hypertensive, insulin requiring type 2 diabetic female who presented to the Otolaryngology service for elective surgery. Approximately one year ago she had been noted to have a mass on her tongue that intermittently bleed. A biopsy reportedly revealed squamous cell carcinoma prompting the current hospitalization. A PET scan, obtained on 5/15/17 revealed right axillary and retropectoral lymph nodes that were avid for FDG and she had activity in the left side of her tongue. There was no definite FDG avid cervical lymphadenopathy.    She has a medical history significant for having hypertension, hypothyroidism, chronic obstructive pulmonary disease, insulin requiring type 2 diabetes mellitus, obesity, and obstructive sleep apnea. 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 She has chronic diastolic heart failure and is known to have moderate pulmonary hypertension. She has had Parkinson's diease and has had TIA's and a right occipital infarction. She has anxiety and depression. She previously she had a detachment of her left retina and has had bilateral lens implants for cataracts. She had an bioprosthetic aortic valve replacement and she has had coronary artery stents inserted in 2013 (into a mid LAD lesion and a proximal and mid right coronary artery.) She has had a cholecystectomy, a hysterectomy, an appendectomy, and bilateral shoulder surgery for rotator cuff injuries. She has had a lumbar fusion on 5/18/10 at [facility]. On 11/22/14 she was evaluated in the ED at [facility] with complaints of having a cough and dyspnea. Because she had a systolic murmur, on 5/1/15 she was hospitalized on the Medical Service at [facility] for a Trans-esophageal echocardiogram. She was found to have aortic stenosis and was hospitalized on the Cardiac Surgery Surgery Service at [facility] from 6/10/15 - 6/12/15 for placement of a TAVR. She had a 23 mm Bliss Rafia valve inserted. She was again evaluated in the ED at [facility] on 10/19/15 when she had complaints of having dyspnea. Apparently the patient left the ED prior to being evaluated. She was hospitalized on the medical service at [facility] from 4/2/16 - 4/6/16 for evaluation and treatment of weakness and loose bowel movements. While hospitalized her diarrhea resolved and she was discharged to home. She was hospitalized on the Medical Service at [facility] from 2/11/17 - 2/15/17 for evaluation and treatment of syncope. She had a "slight troponin leak" and was treated medically. An EEG failed to reveal evidence of the presence of seizures. A transthoracic echocardiogram, obtained on 2/14/17 revealed a global left ventricular ejection fraction of 60% - 65% with grade II diastolic dysfunction. The right ventricular systolic pressures were estimated to be 52 mm Hg, consistent with moderate pulmonary hypertension. Prior to the current hospitalization she took:    1)	Aspirin		2)	Plavix		3)	Lopressor	4)	Losartan                5)	Lasix		6)	Glucophage	7)	Levemir		8)	Synthroid	              9)	Sinemet	10)	Neupro		11)	Albuterol	12)	Xanax                    13)	Zoloft		14)	Lamotrigine	15)	Motrin		16)	Gabapentin                             17)	Vitamin D	18)	Potassium  	  She uses BIPAP nocturnally. She is reportedly allergic to penicillin that causes angioedema and ultram that results in a rash. She is intolerant of morphine. She is reportedly allergic to latex. She does not abuse ethanol and she smoked 1 pack of cigarettes per day for 10 years stopping 30 years ago. Her family history is significant for her father having had cirrhosis. She is  and resides with her spouse, Carmelo France [phone] / [phone] in a private home in [place]. There are four stairs to traverse to enter the resident and there are 10 steps to the bedroom. The patient has an adult daughter, Amelie Elizabeth ([phone]). The patient's daughter and the patient's son-in-law reside upstairs in the same residence as the patient. The patient has previously assigned her spouse to be her healthcare agent / proxy although at other times the patient assigned her daughter to be her healthcare agent / proxy . She has decreased vision and uses corrective lenses. She commonly ambulates with the assistance of a rolling walker and / or a cane. Anesthetics have previously resulted in hallucinations. She denied having had headaches, dizziness, or photophobia. She has not had chest pain, or palpitations but she has dyspnea when climbing a flight of stairs. She denied having had abdominal pain, nausea or emesis, diarrhea or constipation, fever or chills. She has not been jaundiced nor has she had dark urine. She has had post-nasal drip and denied having had bleeding from any area other than her tongue. She has had joint pain, mostly in her back. She has depression and anxiety and denied having had suicidal ideation. Her 10-point review of systems was otherwise negative. Her internist is Dr. Orlin Lucero ([phone]). The patient's cardiologist is Dr. Engel ([phone]).    On presentation to PAT / PST she had a:    BP	=	120/60		P	=	82	R	=	18		              Temp	=	37.4		O2 Sat	=	96%	VAS	=	-    She was awake, alert, and fully oriented. She was in no acute distress and did not appear toxic.  She was anicteric. She had reactive pupils and her extra-occular movements were preserved.  She did not have thrush. She had a mass on the left side of her tongue. She did not have JVD.  Her lungs were clear and she had non-labored respirations. She had symmetrical chest wall movements.  She had regular heart tones. She did not have a murmur.  Her abdomen was soft and neither distended nor tender. She was obese. She did not have guarding or rebound. There were no palpable masses or abdominal wall hernias. She had healed surgical scars. There was no CVA tenderness and she had active bowel sounds.  Her extremities were well perfused. She did not have a rash.  She had a healed posterior lumbar surgical scar. She had normal strength. Her musculoskeletal exam was otherwise normal.    Laboratory tests revealed a:    WBC	=	6	Na		=	142	Bilirubin		=	0.9  Neutro	=	-	K		=	3.4	Alk Phos	=	113  Hgb	=	9	Cl		=	104	SGOT		=	22  Hct	=	32%	HCO3		=	21	SGPT		=	6  Plts	=	252	Glucose	=	207  			BUN		=	14	Hemoglobin A1-C	=	7.4  PT	=	-	Creat		=	0.5  PTT	=	-  INR	=	-	Albumin	=	4.0    An EKG revealed NSR. She had inverted "T" waves in lead aVR and aVL.    She was electively admitted to the Otolaryngology service and she underwent anesthesia and surgery commencing at approximately 12:30 PM on 5/17/17. She was ASA 3 and underwent general anesthesia via a 7.0 naso-tracheal tube that was easy to insert. Attempts were made to place a radial arterial cannula. A 16 Fr Lange catheter was inserted. During the approximately 6 hours of anesthesia she was given 2,500 ml of crystalloid and one unit (300 ml) of pRBC. She produced 40 ml of urine and was estimated to have lost 100 ml of blood. During the anesthesia she required vasopressor support with ephedrine and norsynephrine. She was given Lasix. Laboratory tests, obtained at approximately 1 PM, during the anesthesia revealed a:    Na		=	139	Hct	=	29%  K		=	3.6  Glucose	=	114    Prior to commencing the operation she was given 600 mg of clindamycin. She underwent a left glossectomy, left neck dissection and tracheostomy. On completion of the anesthesia and surgery she was transferred to the PACU. During the approximately one hour she was in the PACU she had a:    BP	= 134 - 148/45 - 65	P	= 60 - 67		R	= 12 - 17  Temp	= 37			O2 Sat	= 98% - 100%    She was given aspirin, heparin sq, albuterol, IV fluid, xanax, zoloft, Lamictal, Sinemet, Lasix, Insulin, Lopressor, Losartan, Tylenol, Percocet, and Dilaudid. She was subsequently transferred to the SICU where she is now seen.    While in the SICU she has had a:    BP	=	113 - 152/44 - 65  P	=	60 - 80		O2 Sat	=	94% - 100%  R	=	12 - 19		I/O	=	300 in/440 out (5/17 - 5/18 < 24 hours)  Temp	=	36.4 - 37.2			CRISTIAN = 63 ml    Glucose		=	192		Weight		=	83.5 Kg  						BMI		=	38.5    Awake, alert, and interactive. Communicates effectively. In no distress. Anicteric. Pupils reactive with post-operative changes. Extra-occular movements intact.  Small bore NGT in place.  No thrush. Tongue with operative changes. No bleeding.  Tracheostomy in place with some leonie-tracheal bloody secretions. CRISTIAN sero-sanguinous.  Lungs with scattered rhonchi. Clear with coughing.  COR - RRR without a murmur.  Abdomen obese. Neither tender nor distended.  Extremities well perfused.    WBC	=	13  Neutro	=	-  Hgb	=	11  Hct	=	37%  Plts	=	259    A chest radiograph revealed the presence of a small bore NGT that ended below the diaphragm. She had a tracheostomy in place and she had a TAVR. She had bilateral shoulder prosthesis.    Remains on:    1)	NS @ 50 ml/hr										              2)	Lasix 40 mg via NGT q24 hours  3)	Lopressor 25 mg via NGT q12 hours					                            4)	Losartan 20 mg via NGT q24 hours				              	              5)	Aspirin 81 mg po q24 hours								6)	Synthroid 25 micrograms via NGT q24 hours						7)	Zoloft 25 mg via NGT q24 hours								8)	Lamictal 100 mg via NGT q24 hours							9)	Xanax 1 mg via NGT qhs								10)	Sinemet 25/100 via NGT qid								11)	Insulin  	a)	Lantus 30 units sq qAM  	b)	Sliding scale  12)	Tylenol 650 mg po q6 hours prn								13)	Percocet 1 - 2 tab po q4 hours prn							14)	Zofran prn										15)	Albuterol 2.5 mg via nebulizer q6 hours							16)	Heparin 5,000 units sq q12 hours							17)	NPO except medications with sips / chips.						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 She is stable. Plan to:    1)	Admit her to the SICU (done).								2)	Clarify medication orders. No need for parenteral fluid and Lasix concurrently. Will indicate reason for each medication and provide hold parameters for antihypertensive medications.											3)	Commence enteral feedings. Will commence tube feedings and plan for an oral diet in the near future.											4)	Change the aspirin and percocet to administration via the NGT, not orally. Will give oxycodone not percocet and will give as a solution / liquid.					5)	Change the heparin to every 8 hours as opposed to q12 hour dosing.			6)	Commence treatment with a statin. Has had a prior CVA (as evidenced by prior CT scan of her brain) and has coronary artery disease. Unclear as to why she was not being treated with a statin.												7)	Allow and assist her to be out of bed.							8)	Will obtain a physical therapy as well as a speech therapy consultation.			9)	Plan expeditious transfer to the otolaryngology floor.					10)	Full support in place.    Viktor Castro May 17th, 2017  10:40 PM    Day of Hospitalization  Day of Operation  Day of SICU Admission  Initial SICU Evaluation    I was asked to evaluate this patient, render a Surgical Critical Care Consultation and to provide ongoing care and monitoring for this patient. Her chart is reviewed and she is examined.    This patient is a 71-year-old obese, hypertensive, insulin requiring type 2 diabetic female who presented to the Otolaryngology service for elective surgery. Approximately one year ago she had been noted to have a mass on her tongue that intermittently bleed. A biopsy reportedly revealed squamous cell carcinoma prompting the current hospitalization. A PET scan, obtained on 5/15/17 revealed right axillary and retropectoral lymph nodes that were avid for FDG and she had activity in the left side of her tongue. There was no definite FDG avid cervical lymphadenopathy.    She has a medical history significant for having hypertension, hypothyroidism, chronic obstructive pulmonary disease, insulin requiring type 2 diabetes mellitus, obesity, and obstructive sleep apnea. 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 She has chronic diastolic heart failure and is known to have moderate pulmonary hypertension. She has had Parkinson's diease and has had TIA's and a right occipital infarction. She has anxiety and depression. She previously she had a detachment of her left retina and has had bilateral lens implants for cataracts. She had an bioprosthetic aortic valve replacement and she has had coronary artery stents inserted in 2013 (into a mid LAD lesion and a proximal and mid right coronary artery.) She has had a cholecystectomy, a hysterectomy, an appendectomy, and bilateral shoulder surgery for rotator cuff injuries. She has had a lumbar fusion on 5/18/10 at [facility]. On 11/22/14 she was evaluated in the ED at [facility] with complaints of having a cough and dyspnea. Because she had a systolic murmur, on 5/1/15 she was hospitalized on the Medical Service at [facility] for a Trans-esophageal echocardiogram. She was found to have aortic stenosis and was hospitalized on the Cardiac Surgery Surgery Service at [facility] from 6/10/15 - 6/12/15 for placement of a TAVR. She had a 23 mm Bliss Rafia valve inserted. She was again evaluated in the ED at [facility] on 10/19/15 when she had complaints of having dyspnea. Apparently the patient left the ED prior to being evaluated. She was hospitalized on the medical service at [facility] from 4/2/16 - 4/6/16 for evaluation and treatment of weakness and loose bowel movements. While hospitalized her diarrhea resolved and she was discharged to home. She was hospitalized on the Medical Service at [facility] from 2/11/17 - 2/15/17 for evaluation and treatment of syncope. She had a "slight troponin leak" and was treated medically. An EEG failed to reveal evidence of the presence of seizures. A transthoracic echocardiogram, obtained on 2/14/17 revealed a global left ventricular ejection fraction of 60% - 65% with grade II diastolic dysfunction. The right ventricular systolic pressures were estimated to be 52 mm Hg, consistent with moderate pulmonary hypertension. Prior to the current hospitalization she took:    1)	Aspirin		2)	Plavix		3)	Lopressor	4)	Losartan                5)	Lasix		6)	Glucophage	7)	Levemir		8)	Synthroid	              9)	Sinemet	10)	Neupro		11)	Albuterol	12)	Xanax                    13)	Zoloft		14)	Lamotrigine	15)	Motrin		16)	Gabapentin                             17)	Vitamin D	18)	Potassium  	  She uses BIPAP nocturnally. She is reportedly allergic to penicillin that causes angioedema and ultram that results in a rash. She is intolerant of morphine. She is reportedly allergic to latex. She does not abuse ethanol and she smoked 1 pack of cigarettes per day for 10 years stopping 30 years ago. Her family history is significant for her father having had cirrhosis. She is  and resides with her spouse, Carmelo France [phone] / [phone] in a private home in [place]. There are four stairs to traverse to enter the resident and there are 10 steps to the bedroom. The patient has an adult daughter, Amelie Elizabeth ([phone]). The patient's daughter and the patient's son-in-law reside upstairs in the same residence as the patient. The patient has previously assigned her spouse to be her healthcare agent / proxy although at other times the patient assigned her daughter to be her healthcare agent / proxy . She has decreased vision and uses corrective lenses. She commonly ambulates with the assistance of a rolling walker and / or a cane. Anesthetics have previously resulted in hallucinations. She denied having had headaches, dizziness, or photophobia. She has not had chest pain, or palpitations but she has dyspnea when climbing a flight of stairs. She denied having had abdominal pain, nausea or emesis, diarrhea or constipation, fever or chills. She has not been jaundiced nor has she had dark urine. She has had post-nasal drip and denied having had bleeding from any area other than her tongue. She has had joint pain, mostly in her back. She has depression and anxiety and denied having had suicidal ideation. Her 10-point review of systems was otherwise negative. Her internist is Dr. Orlin Lucero ([phone]). The patient's cardiologist is Dr. Engel ([phone]).    On presentation to PAT / PST she had a:    BP	=	120/60		P	=	82	R	=	18		              Temp	=	37.4		O2 Sat	=	96%	VAS	=	-    She was awake, alert, and fully oriented. She was in no acute distress and did not appear toxic.  She was anicteric. She had reactive pupils and her extra-occular movements were preserved.  She did not have thrush. She had a mass on the left side of her tongue. She did not have JVD.  Her lungs were clear and she had non-labored respirations. She had symmetrical chest wall movements.  She had regular heart tones. She did not have a murmur.  Her abdomen was soft and neither distended nor tender. She was obese. She did not have guarding or rebound. There were no palpable masses or abdominal wall hernias. She had healed surgical scars. There was no CVA tenderness and she had active bowel sounds.  Her extremities were well perfused. She did not have a rash.  She had a healed posterior lumbar surgical scar. She had normal strength. Her musculoskeletal exam was otherwise normal.    Laboratory tests revealed a:    WBC	=	6	Na		=	142	Bilirubin		=	0.9  Neutro	=	-	K		=	3.4	Alk Phos	=	113  Hgb	=	9	Cl		=	104	SGOT		=	22  Hct	=	32%	HCO3		=	21	SGPT		=	6  Plts	=	252	Glucose	=	207  			BUN		=	14	Hemoglobin A1-C	=	7.4  PT	=	-	Creat		=	0.5  PTT	=	-  INR	=	-	Albumin	=	4.0    An EKG revealed NSR. She had inverted "T" waves in lead aVR and aVL.    She was electively admitted to the Otolaryngology service and she underwent anesthesia and surgery commencing at approximately 12:30 PM on 5/17/17. She was ASA 3 and underwent general anesthesia via a 7.0 naso-tracheal tube that was easy to insert. Attempts were made to place a radial arterial cannula. A 16 Fr Lange catheter was inserted. During the approximately 6 hours of anesthesia she was given 2,500 ml of crystalloid and one unit (300 ml) of pRBC. She produced 40 ml of urine and was estimated to have lost 100 ml of blood. During the anesthesia she required vasopressor support with ephedrine and norsynephrine. She was given Lasix. Laboratory tests, obtained at approximately 1 PM, during the anesthesia revealed a:    Na		=	139	Hct	=	29%  K		=	3.6  Glucose	=	114    Prior to commencing the operation she was given 600 mg of clindamycin. She underwent a left glossectomy, left neck dissection and tracheostomy. On completion of the anesthesia and surgery she was transferred to the PACU. During the approximately one hour she was in the PACU she had a:    BP	= 134 - 148/45 - 65	P	= 60 - 67		R	= 12 - 17  Temp	= 37			O2 Sat	= 98% - 100%    She was given aspirin, heparin sq, albuterol, IV fluid, xanax, zoloft, Lamictal, Sinemet, Lasix, Insulin, Lopressor, Losartan, Tylenol, Percocet, and Dilaudid. She was subsequently transferred to the SICU where she is now seen.    While in the SICU she has had a:    BP	=	113 - 152/44 - 65  P	=	60 - 80		O2 Sat	=	94% - 100%  R	=	12 - 19		I/O	=	300 in/440 out (5/17 - 5/18 < 24 hours)  Temp	=	36.4 - 37.2			CRISTIAN = 63 ml    Glucose		=	192		Weight		=	83.5 Kg  						BMI		=	38.5    Awake, alert, and interactive. Communicates effectively. In no distress. Anicteric. Pupils reactive with post-operative changes. Extra-occular movements intact.  Small bore NGT in place.  No thrush. Tongue with operative changes. No bleeding.  Tracheostomy in place with some leonie-tracheal bloody secretions. CRISTIAN sero-sanguinous.  Lungs with scattered rhonchi. Clear with coughing.  COR - RRR without a murmur.  Abdomen obese. Neither tender nor distended.  Extremities well perfused.    WBC	=	13  Neutro	=	-  Hgb	=	11  Hct	=	37%  Plts	=	259    A chest radiograph revealed the presence of a small bore NGT that ended below the diaphragm. She had a tracheostomy in place and she had a TAVR. She had bilateral shoulder prosthesis.    Remains on:    1)	NS @ 50 ml/hr										              2)	Lasix 40 mg via NGT q24 hours  3)	Lopressor 25 mg via NGT q12 hours					                            4)	Losartan 20 mg via NGT q24 hours				              	              5)	Aspirin 81 mg po q24 hours							              6)	Synthroid 25 micrograms via NGT q24 hours					                   7)	Zoloft 25 mg via NGT q24 hours								8)	Lamictal 100 mg via NGT q24 hours							9)	Xanax 1 mg via NGT qhs								10)	Sinemet 25/100 via NGT qid								11)	Insulin  	a)	Lantus 30 units sq qAM  	b)	Sliding scale  12)	Tylenol 650 mg po q6 hours prn								13)	Percocet 1 - 2 tab po q4 hours prn							14)	Zofran prn										15)	Albuterol 2.5 mg via nebulizer q6 hours							16)	Heparin 5,000 units sq q12 hours							17)	NPO except medications with sips / chips.						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 She is stable. Plan to:    1)	Admit her to the SICU (done).								2)	Clarify medication orders. No need for parenteral fluid and Lasix concurrently. Will indicate reason for each medication and provide hold parameters for antihypertensive medications.											3)	Commence enteral feedings. Will commence tube feedings and plan for an oral diet in the near future.											4)	Change the aspirin and percocet to administration via the NGT, not orally. Will give oxycodone not percocet and will give as a solution / liquid.					5)	Change the heparin to every 8 hours as opposed to q12 hour dosing.			6)	Commence treatment with a statin. Has had a prior CVA (as evidenced by prior CT scan of her brain) and has coronary artery disease. Unclear as to why she was not being treated with a statin.												7)	Allow and assist her to be out of bed.							8)	Will obtain a physical therapy as well as a speech therapy consultation.			9)	Plan expeditious transfer to the otolaryngology floor.					10)	Full support in place.    Viktor Castro May 17th, 2017  10:40 PM    Day of Hospitalization  Day of Operation  Day of SICU Admission  Initial SICU Evaluation    I was asked to evaluate this patient, render a Surgical Critical Care Consultation and to provide ongoing care and monitoring for this patient. Her chart is reviewed and she is examined.    This patient is a 71-year-old obese, hypertensive, insulin requiring type 2 diabetic female who presented to the Otolaryngology service for elective surgery. Approximately one year ago she had been noted to have a mass on her tongue that intermittently bleed. A biopsy reportedly revealed squamous cell carcinoma prompting the current hospitalization. A PET scan, obtained on 5/15/17 revealed right axillary and retropectoral lymph nodes that were avid for FDG and she had activity in the left side of her tongue. There was no definite FDG avid cervical lymphadenopathy.    She has a medical history significant for having hypertension, hypothyroidism, chronic obstructive pulmonary disease, insulin requiring type 2 diabetes mellitus, obesity, and obstructive sleep apnea. 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 She has chronic diastolic heart failure and is known to have moderate pulmonary hypertension. She has had Parkinson's diease and has had TIA's and a right occipital infarction. She has anxiety and depression. She previously she had a detachment of her left retina and has had bilateral lens implants for cataracts. She had an bioprosthetic aortic valve replacement and she has had coronary artery stents inserted in 2013 (into a mid LAD lesion and a proximal and mid right coronary artery.) She has had a cholecystectomy, a hysterectomy, an appendectomy, and bilateral shoulder surgery for rotator cuff injuries. She has had a lumbar fusion on 5/18/10 at [facility]. On 11/22/14 she was evaluated in the ED at [facility] with complaints of having a cough and dyspnea. Because she had a systolic murmur, on 5/1/15 she was hospitalized on the Medical Service at [facility] for a Trans-esophageal echocardiogram. She was found to have aortic stenosis and was hospitalized on the Cardiac Surgery Surgery Service at [facility] from 6/10/15 - 6/12/15 for placement of a TAVR. She had a 23 mm Bliss Rafia valve inserted. She was again evaluated in the ED at [facility] on 10/19/15 when she had complaints of having dyspnea. Apparently the patient left the ED prior to being evaluated. She was hospitalized on the medical service at [facility] from 4/2/16 - 4/6/16 for evaluation and treatment of weakness and loose bowel movements. While hospitalized her diarrhea resolved and she was discharged to home. She was hospitalized on the Medical Service at [facility] from 2/11/17 - 2/15/17 for evaluation and treatment of syncope. She had a "slight troponin leak" and was treated medically. An EEG failed to reveal evidence of the presence of seizures. A transthoracic echocardiogram, obtained on 2/14/17 revealed a global left ventricular ejection fraction of 60% - 65% with grade II diastolic dysfunction. The right ventricular systolic pressures were estimated to be 52 mm Hg, consistent with moderate pulmonary hypertension. Prior to the current hospitalization she took:    1)	Aspirin		2)	Plavix		3)	Lopressor	4)	Losartan                5)	Lasix		6)	Glucophage	7)	Levemir		8)	Synthroid	              9)	Sinemet	10)	Neupro		11)	Albuterol	12)	Xanax                    13)	Zoloft		14)	Lamotrigine	15)	Motrin		16)	Gabapentin                             17)	Vitamin D	18)	Potassium  	  She uses BIPAP nocturnally. She is reportedly allergic to penicillin that causes angioedema and ultram that results in a rash. She is intolerant of morphine. She is reportedly allergic to latex. She does not abuse ethanol and she smoked 1 pack of cigarettes per day for 10 years stopping 30 years ago. Her family history is significant for her father having had cirrhosis. She is  and resides with her spouse, Carmelo France [phone] / [phone] in a private home in [place]. There are four stairs to traverse to enter the resident and there are 10 steps to the bedroom. The patient has an adult daughter, Amelie Elizabeth ([phone]). The patient's daughter and the patient's son-in-law reside upstairs in the same residence as the patient. The patient has previously assigned her spouse to be her healthcare agent / proxy although at other times the patient assigned her daughter to be her healthcare agent / proxy . She has decreased vision and uses corrective lenses. She commonly ambulates with the assistance of a rolling walker and / or a cane. Anesthetics have previously resulted in hallucinations. She denied having had headaches, dizziness, or photophobia. She has not had chest pain, or palpitations but she has dyspnea when climbing a flight of stairs. She denied having had abdominal pain, nausea or emesis, diarrhea or constipation, fever or chills. She has not been jaundiced nor has she had dark urine. She has had post-nasal drip and denied having had bleeding from any area other than her tongue. She has had joint pain, mostly in her back. She has depression and anxiety and denied having had suicidal ideation. Her 10-point review of systems was otherwise negative. Her internist is Dr. Orlin Lucero ([phone]). The patient's cardiologist is Dr. Engel ([phone]).    On presentation to PAT / PST she had a:    BP	=	120/60		P	=	82	R	=	18		              Temp	=	37.4		O2 Sat	=	96%	VAS	=	-    She was awake, alert, and fully oriented. She was in no acute distress and did not appear toxic.  She was anicteric. She had reactive pupils and her extra-occular movements were preserved.  She did not have thrush. She had a mass on the left side of her tongue. She did not have JVD.  Her lungs were clear and she had non-labored respirations. She had symmetrical chest wall movements.  She had regular heart tones. She did not have a murmur.  Her abdomen was soft and neither distended nor tender. She was obese. She did not have guarding or rebound. There were no palpable masses or abdominal wall hernias. She had healed surgical scars. There was no CVA tenderness and she had active bowel sounds.  Her extremities were well perfused. She did not have a rash.  She had a healed posterior lumbar surgical scar. She had normal strength. Her musculoskeletal exam was otherwise normal.    Laboratory tests revealed a:    WBC	=	6	Na		=	142	Bilirubin		=	0.9  Neutro	=	-	K		=	3.4	Alk Phos	=	113  Hgb	=	9	Cl		=	104	SGOT		=	22  Hct	=	32%	HCO3		=	21	SGPT		=	6  Plts	=	252	Glucose	=	207  			BUN		=	14	Hemoglobin A1-C	=	7.4  PT	=	-	Creat		=	0.5  PTT	=	-  INR	=	-	Albumin	=	4.0    An EKG revealed NSR. She had inverted "T" waves in lead aVR and aVL.    She was electively admitted to the Otolaryngology service and she underwent anesthesia and surgery commencing at approximately 12:30 PM on 5/17/17. She was ASA 3 and underwent general anesthesia via a 7.0 naso-tracheal tube that was easy to insert. Attempts were made to place a radial arterial cannula. A 16 Fr Lange catheter was inserted. During the approximately 6 hours of anesthesia she was given 2,500 ml of crystalloid and one unit (300 ml) of pRBC. She produced 40 ml of urine and was estimated to have lost 100 ml of blood. During the anesthesia she required vasopressor support with ephedrine and norsynephrine. She was given Lasix. Laboratory tests, obtained at approximately 1 PM, during the anesthesia revealed a:    Na		=	139	Hct	=	29%  K		=	3.6  Glucose	=	114    Prior to commencing the operation she was given 600 mg of clindamycin. She underwent a left glossectomy, left neck dissection and tracheostomy. On completion of the anesthesia and surgery she was transferred to the PACU. During the approximately one hour she was in the PACU she had a:    BP	= 134 - 148/45 - 65	P	= 60 - 67		R	= 12 - 17  Temp	= 37			O2 Sat	= 98% - 100%    She was given aspirin, heparin sq, albuterol, IV fluid, xanax, zoloft, Lamictal, Sinemet, Lasix, Insulin, Lopressor, Losartan, Tylenol, Percocet, and Dilaudid. She was subsequently transferred to the SICU where she is now seen.    While in the SICU she has had a:    BP	=	113 - 152/44 - 65  P	=	60 - 80		O2 Sat	=	94% - 100%  R	=	12 - 19		I/O	=	300 in/440 out (5/17 - 5/18 < 24 hours)  Temp	=	36.4 - 37.2			CRISTIAN = 63 ml    Glucose		=	192		Weight		=	83.5 Kg  						BMI		=	38.5    Awake, alert, and interactive. Communicates effectively. In no distress. Anicteric. Pupils reactive with post-operative changes. Extra-occular movements intact.  Small bore NGT in place.  No thrush. Tongue with operative changes. No bleeding.  Tracheostomy in place with some leonie-tracheal bloody secretions. CRISTIAN sero-sanguinous.  Lungs with scattered rhonchi. Clear with coughing.  COR - RRR without a murmur.  Abdomen obese. Neither tender nor distended.  Extremities well perfused.    WBC	=	13  Neutro	=	-  Hgb	=	11  Hct	=	37%  Plts	=	259    A chest radiograph revealed the presence of a small bore NGT that ended below the diaphragm. She had a tracheostomy in place and she had a TAVR. She had bilateral shoulder prosthesis.    Remains on:    1)	NS @ 50 ml/hr										              2)	Lasix 40 mg via NGT q24 hours  3)	Lopressor 25 mg via NGT q12 hours					                            4)	Losartan 20 mg via NGT q24 hours				              	              5)	Aspirin 81 mg po q24 hours							              6)	Synthroid 25 micrograms via NGT q24 hours					                   7)	Zoloft 25 mg via NGT q24 hours							              8)	Lamictal 100 mg via NGT q24 hours						               9)	Xanax 1 mg via NGT qhs							              10)	Sinemet 25/100 via NGT qid							              11)	Insulin  	a)	Lantus 30 units sq qAM  	b)	Sliding scale  12)	Tylenol 650 mg po q6 hours prn							              13)	Percocet 1 - 2 tab po q4 hours prn							14)	Zofran prn										15)	Albuterol 2.5 mg via nebulizer q6 hours							16)	Heparin 5,000 units sq q12 hours							17)	NPO except medications with sips / chips.						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 She is stable. Plan to:    1)	Admit her to the SICU (done).								2)	Clarify medication orders. No need for parenteral fluid and Lasix concurrently. Will indicate reason for each medication and provide hold parameters for antihypertensive medications.											3)	Commence enteral feedings. Will commence tube feedings and plan for an oral diet in the near future.											4)	Change the aspirin and percocet to administration via the NGT, not orally. Will give oxycodone not percocet and will give as a solution / liquid.					5)	Change the heparin to every 8 hours as opposed to q12 hour dosing.			6)	Commence treatment with a statin. Has had a prior CVA (as evidenced by prior CT scan of her brain) and has coronary artery disease. Unclear as to why she was not being treated with a statin.												7)	Allow and assist her to be out of bed.							8)	Will obtain a physical therapy as well as a speech therapy consultation.			9)	Plan expeditious transfer to the otolaryngology floor.					10)	Full support in place.    Viktor Castro May 17th, 2017  10:40 PM    Day of Hospitalization  Day of Operation  Day of SICU Admission  Initial SICU Evaluation    I was asked to evaluate this patient, render a Surgical Critical Care Consultation and to provide ongoing care and monitoring for this patient. Her chart is reviewed and she is examined.    This patient is a 71-year-old obese, hypertensive, insulin requiring type 2 diabetic female who presented to the Otolaryngology service for elective surgery. Approximately one year ago she had been noted to have a mass on her tongue that intermittently bleed. A biopsy reportedly revealed squamous cell carcinoma prompting the current hospitalization. A PET scan, obtained on 5/15/17 revealed right axillary and retropectoral lymph nodes that were avid for FDG and she had activity in the left side of her tongue. There was no definite FDG avid cervical lymphadenopathy.    She has a medical history significant for having hypertension, hypothyroidism, chronic obstructive pulmonary disease, insulin requiring type 2 diabetes mellitus, obesity, and obstructive sleep apnea. 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 She has chronic diastolic heart failure and is known to have moderate pulmonary hypertension. She has had Parkinson's diease and has had TIA's and a right occipital infarction. She has anxiety and depression. She previously she had a detachment of her left retina and has had bilateral lens implants for cataracts. She had an bioprosthetic aortic valve replacement and she has had coronary artery stents inserted in 2013 (into a mid LAD lesion and a proximal and mid right coronary artery.) She has had a cholecystectomy, a hysterectomy, an appendectomy, and bilateral shoulder surgery for rotator cuff injuries. She has had a lumbar fusion on 5/18/10 at [facility]. On 11/22/14 she was evaluated in the ED at [facility] with complaints of having a cough and dyspnea. Because she had a systolic murmur, on 5/1/15 she was hospitalized on the Medical Service at [facility] for a Trans-esophageal echocardiogram. She was found to have aortic stenosis and was hospitalized on the Cardiac Surgery Surgery Service at [facility] from 6/10/15 - 6/12/15 for placement of a TAVR. She had a 23 mm Bliss Rafia valve inserted. She was again evaluated in the ED at [facility] on 10/19/15 when she had complaints of having dyspnea. Apparently the patient left the ED prior to being evaluated. She was hospitalized on the medical service at [facility] from 4/2/16 - 4/6/16 for evaluation and treatment of weakness and loose bowel movements. While hospitalized her diarrhea resolved and she was discharged to home. She was hospitalized on the Medical Service at [facility] from 2/11/17 - 2/15/17 for evaluation and treatment of syncope. She had a "slight troponin leak" and was treated medically. An EEG failed to reveal evidence of the presence of seizures. A transthoracic echocardiogram, obtained on 2/14/17 revealed a global left ventricular ejection fraction of 60% - 65% with grade II diastolic dysfunction. The right ventricular systolic pressures were estimated to be 52 mm Hg, consistent with moderate pulmonary hypertension. Prior to the current hospitalization she took:    1)	Aspirin		2)	Plavix		3)	Lopressor	4)	Losartan                5)	Lasix		6)	Glucophage	7)	Levemir		8)	Synthroid	              9)	Sinemet	10)	Neupro		11)	Albuterol	12)	Xanax                    13)	Zoloft		14)	Lamotrigine	15)	Motrin		16)	Gabapentin                             17)	Vitamin D	18)	Potassium  	  She uses BIPAP nocturnally. She is reportedly allergic to penicillin that causes angioedema and ultram that results in a rash. She is intolerant of morphine. She is reportedly allergic to latex. She does not abuse ethanol and she smoked 1 pack of cigarettes per day for 10 years stopping 30 years ago. Her family history is significant for her father having had cirrhosis. She is  and resides with her spouse, Carmelo France [phone] / [phone] in a private home in [place]. There are four stairs to traverse to enter the resident and there are 10 steps to the bedroom. The patient has an adult daughter, Amelie Elizabeth ([phone]). The patient's daughter and the patient's son-in-law reside upstairs in the same residence as the patient. The patient has previously assigned her spouse to be her healthcare agent / proxy although at other times the patient assigned her daughter to be her healthcare agent / proxy . She has decreased vision and uses corrective lenses. She commonly ambulates with the assistance of a rolling walker and / or a cane. Anesthetics have previously resulted in hallucinations. She denied having had headaches, dizziness, or photophobia. She has not had chest pain, or palpitations but she has dyspnea when climbing a flight of stairs. She denied having had abdominal pain, nausea or emesis, diarrhea or constipation, fever or chills. She has not been jaundiced nor has she had dark urine. She has had post-nasal drip and denied having had bleeding from any area other than her tongue. She has had joint pain, mostly in her back. She has depression and anxiety and denied having had suicidal ideation. Her 10-point review of systems was otherwise negative. Her internist is Dr. Orlin Lucero ([phone]). The patient's cardiologist is Dr. Engel ([phone]).    On presentation to PAT / PST she had a:    BP	=	120/60		P	=	82	R	=	18		              Temp	=	37.4		O2 Sat	=	96%	VAS	=	-    She was awake, alert, and fully oriented. She was in no acute distress and did not appear toxic.  She was anicteric. She had reactive pupils and her extra-occular movements were preserved.  She did not have thrush. She had a mass on the left side of her tongue. She did not have JVD.  Her lungs were clear and she had non-labored respirations. She had symmetrical chest wall movements.  She had regular heart tones. She did not have a murmur.  Her abdomen was soft and neither distended nor tender. She was obese. She did not have guarding or rebound. There were no palpable masses or abdominal wall hernias. She had healed surgical scars. There was no CVA tenderness and she had active bowel sounds.  Her extremities were well perfused. She did not have a rash.  She had a healed posterior lumbar surgical scar. She had normal strength. Her musculoskeletal exam was otherwise normal.    Laboratory tests revealed a:    WBC	=	6	Na		=	142	Bilirubin		=	0.9  Neutro	=	-	K		=	3.4	Alk Phos	=	113  Hgb	=	9	Cl		=	104	SGOT		=	22  Hct	=	32%	HCO3		=	21	SGPT		=	6  Plts	=	252	Glucose	=	207  			BUN		=	14	Hemoglobin A1-C	=	7.4  PT	=	-	Creat		=	0.5  PTT	=	-  INR	=	-	Albumin	=	4.0    An EKG revealed NSR. She had inverted "T" waves in lead aVR and aVL.    She was electively admitted to the Otolaryngology service and she underwent anesthesia and surgery commencing at approximately 12:30 PM on 5/17/17. She was ASA 3 and underwent general anesthesia via a 7.0 naso-tracheal tube that was easy to insert. Attempts were made to place a radial arterial cannula. A 16 Fr Lange catheter was inserted. During the approximately 6 hours of anesthesia she was given 2,500 ml of crystalloid and one unit (300 ml) of pRBC. She produced 40 ml of urine and was estimated to have lost 100 ml of blood. During the anesthesia she required vasopressor support with ephedrine and norsynephrine. She was given Lasix. Laboratory tests, obtained at approximately 1 PM, during the anesthesia revealed a:    Na		=	139	Hct	=	29%  K		=	3.6  Glucose	=	114    Prior to commencing the operation she was given 600 mg of clindamycin. She underwent a left glossectomy, left neck dissection and tracheostomy. On completion of the anesthesia and surgery she was transferred to the PACU. During the approximately one hour she was in the PACU she had a:    BP	= 134 - 148/45 - 65	P	= 60 - 67		R	= 12 - 17  Temp	= 37			O2 Sat	= 98% - 100%    She was given aspirin, heparin sq, albuterol, IV fluid, xanax, zoloft, Lamictal, Sinemet, Lasix, Insulin, Lopressor, Losartan, Tylenol, Percocet, and Dilaudid. She was subsequently transferred to the SICU where she is now seen.    While in the SICU she has had a:    BP	=	113 - 152/44 - 65  P	=	60 - 80		O2 Sat	=	94% - 100%  R	=	12 - 19		I/O	=	300 in/440 out (5/17 - 5/18 < 24 hours)  Temp	=	36.4 - 37.2			CRISTIAN = 63 ml    Glucose		=	192		Weight		=	83.5 Kg  						BMI		=	38.5    Awake, alert, and interactive. Communicates effectively. In no distress. Anicteric. Pupils reactive with post-operative changes. Extra-occular movements intact.  Small bore NGT in place.  No thrush. Tongue with operative changes. No bleeding.  Tracheostomy in place with some leonie-tracheal bloody secretions. CRISTIAN sero-sanguinous.  Lungs with scattered rhonchi. Clear with coughing.  COR - RRR without a murmur.  Abdomen obese. Neither tender nor distended.  Extremities well perfused.    WBC	=	13  Neutro	=	-  Hgb	=	11  Hct	=	37%  Plts	=	259    A chest radiograph revealed the presence of a small bore NGT that ended below the diaphragm. She had a tracheostomy in place and she had a TAVR. She had bilateral shoulder prosthesis.    Remains on:    1)	NS @ 50 ml/hr										              2)	Lasix 40 mg via NGT q24 hours  3)	Lopressor 25 mg via NGT q12 hours					                            4)	Losartan 20 mg via NGT q24 hours				              	              5)	Aspirin 81 mg po q24 hours							              6)	Synthroid 25 micrograms via NGT q24 hours					                   7)	Zoloft 25 mg via NGT q24 hours							              8)	Lamictal 100 mg via NGT q24 hours						               9)	Xanax 1 mg via NGT qhs							              10)	Sinemet 25/100 via NGT qid							              11)	Insulin  	a)	Lantus 30 units sq qAM  	b)	Sliding scale  12)	Tylenol 650 mg po q6 hours prn							              13)	Percocet 1 - 2 tab po q4 hours prn						              14)	Zofran prn								              15)	Albuterol 2.5 mg via nebulizer q6 hours							16)	Heparin 5,000 units sq q12 hours							17)	NPO except medications with sips / chips.						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 She is stable. Plan to:    1)	Admit her to the SICU (done).								2)	Clarify medication orders. No need for parenteral fluid and Lasix concurrently. Will indicate reason for each medication and provide hold parameters for antihypertensive medications.											3)	Commence enteral feedings. Will commence tube feedings and plan for an oral diet in the near future.											4)	Change the aspirin and percocet to administration via the NGT, not orally. Will give oxycodone not percocet and will give as a solution / liquid.					5)	Change the heparin to every 8 hours as opposed to q12 hour dosing.			6)	Commence treatment with a statin. Has had a prior CVA (as evidenced by prior CT scan of her brain) and has coronary artery disease. Unclear as to why she was not being treated with a statin.												7)	Allow and assist her to be out of bed.							8)	Will obtain a physical therapy as well as a speech therapy consultation.			9)	Plan expeditious transfer to the otolaryngology floor.					10)	Full support in place.    Viktor Castro May 17th, 2017  10:40 PM    Day of Hospitalization  Day of Operation  Day of SICU Admission  Initial SICU Evaluation    I was asked to evaluate this patient, render a Surgical Critical Care Consultation and to provide ongoing care and monitoring for this patient. Her chart is reviewed and she is examined.    This patient is a 71-year-old obese, hypertensive, insulin requiring type 2 diabetic female who presented to the Otolaryngology service for elective surgery. Approximately one year ago she had been noted to have a mass on her tongue that intermittently bleed. A biopsy reportedly revealed squamous cell carcinoma prompting the current hospitalization. A PET scan, obtained on 5/15/17 revealed right axillary and retropectoral lymph nodes that were avid for FDG and she had activity in the left side of her tongue. There was no definite FDG avid cervical lymphadenopathy.    She has a medical history significant for having hypertension, hypothyroidism, chronic obstructive pulmonary disease, insulin requiring type 2 diabetes mellitus, obesity, and obstructive sleep apnea. 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 She has chronic diastolic heart failure and is known to have moderate pulmonary hypertension. She has had Parkinson's diease and has had TIA's and a right occipital infarction. She has anxiety and depression. She previously she had a detachment of her left retina and has had bilateral lens implants for cataracts. She had an bioprosthetic aortic valve replacement and she has had coronary artery stents inserted in 2013 (into a mid LAD lesion and a proximal and mid right coronary artery.) She has had a cholecystectomy, a hysterectomy, an appendectomy, and bilateral shoulder surgery for rotator cuff injuries. She has had a lumbar fusion on 5/18/10 at [facility]. On 11/22/14 she was evaluated in the ED at [facility] with complaints of having a cough and dyspnea. Because she had a systolic murmur, on 5/1/15 she was hospitalized on the Medical Service at [facility] for a Trans-esophageal echocardiogram. She was found to have aortic stenosis and was hospitalized on the Cardiac Surgery Surgery Service at [facility] from 6/10/15 - 6/12/15 for placement of a TAVR. She had a 23 mm Bliss Rafia valve inserted. She was again evaluated in the ED at [facility] on 10/19/15 when she had complaints of having dyspnea. Apparently the patient left the ED prior to being evaluated. She was hospitalized on the medical service at [facility] from 4/2/16 - 4/6/16 for evaluation and treatment of weakness and loose bowel movements. While hospitalized her diarrhea resolved and she was discharged to home. She was hospitalized on the Medical Service at [facility] from 2/11/17 - 2/15/17 for evaluation and treatment of syncope. She had a "slight troponin leak" and was treated medically. An EEG failed to reveal evidence of the presence of seizures. A transthoracic echocardiogram, obtained on 2/14/17 revealed a global left ventricular ejection fraction of 60% - 65% with grade II diastolic dysfunction. The right ventricular systolic pressures were estimated to be 52 mm Hg, consistent with moderate pulmonary hypertension. Prior to the current hospitalization she took:    1)	Aspirin		2)	Plavix		3)	Lopressor	4)	Losartan                5)	Lasix		6)	Glucophage	7)	Levemir		8)	Synthroid	              9)	Sinemet	10)	Neupro		11)	Albuterol	12)	Xanax                    13)	Zoloft		14)	Lamotrigine	15)	Motrin		16)	Gabapentin                             17)	Vitamin D	18)	Potassium  	  She uses BIPAP nocturnally. She is reportedly allergic to penicillin that causes angioedema and ultram that results in a rash. She is intolerant of morphine. She is reportedly allergic to latex. She does not abuse ethanol and she smoked 1 pack of cigarettes per day for 10 years stopping 30 years ago. Her family history is significant for her father having had cirrhosis. She is  and resides with her spouse, Carmelo France [phone] / [phone] in a private home in [place]. There are four stairs to traverse to enter the resident and there are 10 steps to the bedroom. The patient has an adult daughter, Amelie Elizabeth ([phone]). The patient's daughter and the patient's son-in-law reside upstairs in the same residence as the patient. The patient has previously assigned her spouse to be her healthcare agent / proxy although at other times the patient assigned her daughter to be her healthcare agent / proxy . She has decreased vision and uses corrective lenses. She commonly ambulates with the assistance of a rolling walker and / or a cane. Anesthetics have previously resulted in hallucinations. She denied having had headaches, dizziness, or photophobia. She has not had chest pain, or palpitations but she has dyspnea when climbing a flight of stairs. She denied having had abdominal pain, nausea or emesis, diarrhea or constipation, fever or chills. She has not been jaundiced nor has she had dark urine. She has had post-nasal drip and denied having had bleeding from any area other than her tongue. She has had joint pain, mostly in her back. She has depression and anxiety and denied having had suicidal ideation. Her 10-point review of systems was otherwise negative. Her internist is Dr. Orlin Lucero ([phone]). The patient's cardiologist is Dr. Engel ([phone]).    On presentation to PAT / PST she had a:    BP	=	120/60		P	=	82	R	=	18		              Temp	=	37.4		O2 Sat	=	96%	VAS	=	-    She was awake, alert, and fully oriented. She was in no acute distress and did not appear toxic.  She was anicteric. She had reactive pupils and her extra-occular movements were preserved.  She did not have thrush. She had a mass on the left side of her tongue. She did not have JVD.  Her lungs were clear and she had non-labored respirations. She had symmetrical chest wall movements.  She had regular heart tones. She did not have a murmur.  Her abdomen was soft and neither distended nor tender. She was obese. She did not have guarding or rebound. There were no palpable masses or abdominal wall hernias. She had healed surgical scars. There was no CVA tenderness and she had active bowel sounds.  Her extremities were well perfused. She did not have a rash.  She had a healed posterior lumbar surgical scar. She had normal strength. Her musculoskeletal exam was otherwise normal.    Laboratory tests revealed a:    WBC	=	6	Na		=	142	Bilirubin		=	0.9  Neutro	=	-	K		=	3.4	Alk Phos	=	113  Hgb	=	9	Cl		=	104	SGOT		=	22  Hct	=	32%	HCO3		=	21	SGPT		=	6  Plts	=	252	Glucose	=	207  			BUN		=	14	Hemoglobin A1-C	=	7.4  PT	=	-	Creat		=	0.5  PTT	=	-  INR	=	-	Albumin	=	4.0    An EKG revealed NSR. She had inverted "T" waves in lead aVR and aVL.    She was electively admitted to the Otolaryngology service and she underwent anesthesia and surgery commencing at approximately 12:30 PM on 5/17/17. She was ASA 3 and underwent general anesthesia via a 7.0 naso-tracheal tube that was easy to insert. Attempts were made to place a radial arterial cannula. A 16 Fr Lange catheter was inserted. During the approximately 6 hours of anesthesia she was given 2,500 ml of crystalloid and one unit (300 ml) of pRBC. She produced 40 ml of urine and was estimated to have lost 100 ml of blood. During the anesthesia she required vasopressor support with ephedrine and norsynephrine. She was given Lasix. Laboratory tests, obtained at approximately 1 PM, during the anesthesia revealed a:    Na		=	139	Hct	=	29%  K		=	3.6  Glucose	=	114    Prior to commencing the operation she was given 600 mg of clindamycin. She underwent a left glossectomy, left neck dissection and tracheostomy. On completion of the anesthesia and surgery she was transferred to the PACU. During the approximately one hour she was in the PACU she had a:    BP	= 134 - 148/45 - 65	P	= 60 - 67		R	= 12 - 17  Temp	= 37			O2 Sat	= 98% - 100%    She was given aspirin, heparin sq, albuterol, IV fluid, xanax, zoloft, Lamictal, Sinemet, Lasix, Insulin, Lopressor, Losartan, Tylenol, Percocet, and Dilaudid. She was subsequently transferred to the SICU where she is now seen.    While in the SICU she has had a:    BP	=	113 - 152/44 - 65  P	=	60 - 80		O2 Sat	=	94% - 100%  R	=	12 - 19		I/O	=	300 in/440 out (5/17 - 5/18 < 24 hours)  Temp	=	36.4 - 37.2			CRISTIAN = 63 ml    Glucose		=	192		Weight		=	83.5 Kg  						BMI		=	38.5    Awake, alert, and interactive. Communicates effectively. In no distress. Anicteric. Pupils reactive with post-operative changes. Extra-occular movements intact.  Small bore NGT in place.  No thrush. Tongue with operative changes. No bleeding.  Tracheostomy in place with some leonie-tracheal bloody secretions. CRISTIAN sero-sanguinous.  Lungs with scattered rhonchi. Clear with coughing.  COR - RRR without a murmur.  Abdomen obese. Neither tender nor distended.  Extremities well perfused.    WBC	=	13  Neutro	=	-  Hgb	=	11  Hct	=	37%  Plts	=	259    A chest radiograph revealed the presence of a small bore NGT that ended below the diaphragm. She had a tracheostomy in place and she had a TAVR. She had bilateral shoulder prosthesis.    Remains on:    1)	NS @ 50 ml/hr										              2)	Lasix 40 mg via NGT q24 hours  3)	Lopressor 25 mg via NGT q12 hours					                            4)	Losartan 20 mg via NGT q24 hours				              	              5)	Aspirin 81 mg po q24 hours							              6)	Synthroid 25 micrograms via NGT q24 hours					                   7)	Zoloft 25 mg via NGT q24 hours							              8)	Lamictal 100 mg via NGT q24 hours						               9)	Xanax 1 mg via NGT qhs							              10)	Sinemet 25/100 via NGT qid							              11)	Insulin  	a)	Lantus 30 units sq qAM  	b)	Sliding scale  12)	Tylenol 650 mg po q6 hours prn							              13)	Percocet 1 - 2 tab po q4 hours prn						              14)	Zofran prn								              15)	Albuterol 2.5 mg via nebulizer q6 hours						              16)	Heparin 5,000 units sq q12 hours						              17)	NPO except medications with sips / chips.					              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 She is stable. Plan to:    1)	Admit her to the SICU (done).								2)	Clarify medication orders. No need for parenteral fluid and Lasix concurrently. Will indicate reason for each medication and provide hold parameters for antihypertensive medications.											3)	Commence enteral feedings. Will commence tube feedings and plan for an oral diet in the near future.											4)	Change the aspirin and percocet to administration via the NGT, not orally. Will give oxycodone not percocet and will give as a solution / liquid.					5)	Change the heparin to every 8 hours as opposed to q12 hour dosing.			6)	Commence treatment with a statin. Has had a prior CVA (as evidenced by prior CT scan of her brain) and has coronary artery disease. Unclear as to why she was not being treated with a statin.												7)	Allow and assist her to be out of bed.							8)	Will obtain a physical therapy as well as a speech therapy consultation.			9)	Plan expeditious transfer to the otolaryngology floor.					10)	Full support in place.    Viktor Castro May 17th, 2017  10:40 PM    Day of Hospitalization  Day of Operation  Day of SICU Admission  Initial SICU Evaluation    I was asked to evaluate this patient, render a Surgical Critical Care Consultation and to provide ongoing care and monitoring for this patient. Her chart is reviewed and she is examined.    This patient is a 71-year-old obese, hypertensive, insulin requiring type 2 diabetic female who presented to the Otolaryngology service for elective surgery. Approximately one year ago she had been noted to have a mass on her tongue that intermittently bleed. A biopsy reportedly revealed squamous cell carcinoma prompting the current hospitalization. A PET scan, obtained on 5/15/17 revealed right axillary and retropectoral lymph nodes that were avid for FDG and she had activity in the left side of her tongue. There was no definite FDG avid cervical lymphadenopathy.    She has a medical history significant for having hypertension, hypothyroidism, chronic obstructive pulmonary disease, insulin requiring type 2 diabetes mellitus, obesity, and obstructive sleep apnea. 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 She has chronic diastolic heart failure and is known to have moderate pulmonary hypertension. She has had Parkinson's diease and has had TIA's and a right occipital infarction. She has anxiety and depression. She previously she had a detachment of her left retina and has had bilateral lens implants for cataracts. She had an bioprosthetic aortic valve replacement and she has had coronary artery stents inserted in 2013 (into a mid LAD lesion and a proximal and mid right coronary artery.) She has had a cholecystectomy, a hysterectomy, an appendectomy, and bilateral shoulder surgery for rotator cuff injuries. She has had a lumbar fusion on 5/18/10 at [facility]. On 11/22/14 she was evaluated in the ED at [facility] with complaints of having a cough and dyspnea. Because she had a systolic murmur, on 5/1/15 she was hospitalized on the Medical Service at [facility] for a Trans-esophageal echocardiogram. She was found to have aortic stenosis and was hospitalized on the Cardiac Surgery Surgery Service at [facility] from 6/10/15 - 6/12/15 for placement of a TAVR. She had a 23 mm Bliss Rafia valve inserted. She was again evaluated in the ED at [facility] on 10/19/15 when she had complaints of having dyspnea. Apparently the patient left the ED prior to being evaluated. She was hospitalized on the medical service at [facility] from 4/2/16 - 4/6/16 for evaluation and treatment of weakness and loose bowel movements. While hospitalized her diarrhea resolved and she was discharged to home. She was hospitalized on the Medical Service at [facility] from 2/11/17 - 2/15/17 for evaluation and treatment of syncope. She had a "slight troponin leak" and was treated medically. An EEG failed to reveal evidence of the presence of seizures. A transthoracic echocardiogram, obtained on 2/14/17 revealed a global left ventricular ejection fraction of 60% - 65% with grade II diastolic dysfunction. The right ventricular systolic pressures were estimated to be 52 mm Hg, consistent with moderate pulmonary hypertension. Prior to the current hospitalization she took:    1)	Aspirin		2)	Plavix		3)	Lopressor	4)	Losartan                5)	Lasix		6)	Glucophage	7)	Levemir		8)	Synthroid	              9)	Sinemet	10)	Neupro		11)	Albuterol	12)	Xanax                    13)	Zoloft		14)	Lamotrigine	15)	Motrin		16)	Gabapentin                             17)	Vitamin D	18)	Potassium  	  She uses BIPAP nocturnally. She is reportedly allergic to penicillin that causes angioedema and ultram that results in a rash. She is intolerant of morphine. She is reportedly allergic to latex. She does not abuse ethanol and she smoked 1 pack of cigarettes per day for 10 years stopping 30 years ago. Her family history is significant for her father having had cirrhosis. She is  and resides with her spouse, Carmelo France [phone] / [phone] in a private home in [place]. There are four stairs to traverse to enter the resident and there are 10 steps to the bedroom. The patient has an adult daughter, Amelie Elizabeth ([phone]). The patient's daughter and the patient's son-in-law reside upstairs in the same residence as the patient. The patient has previously assigned her spouse to be her healthcare agent / proxy although at other times the patient assigned her daughter to be her healthcare agent / proxy . She has decreased vision and uses corrective lenses. She commonly ambulates with the assistance of a rolling walker and / or a cane. Anesthetics have previously resulted in hallucinations. She denied having had headaches, dizziness, or photophobia. She has not had chest pain, or palpitations but she has dyspnea when climbing a flight of stairs. She denied having had abdominal pain, nausea or emesis, diarrhea or constipation, fever or chills. She has not been jaundiced nor has she had dark urine. She has had post-nasal drip and denied having had bleeding from any area other than her tongue. She has had joint pain, mostly in her back. She has depression and anxiety and denied having had suicidal ideation. Her 10-point review of systems was otherwise negative. Her internist is Dr. Orlin Lucero ([phone]). The patient's cardiologist is Dr. Engel ([phone]).    On presentation to PAT / PST she had a:    BP	=	120/60		P	=	82	R	=	18		              Temp	=	37.4		O2 Sat	=	96%	VAS	=	-    She was awake, alert, and fully oriented. She was in no acute distress and did not appear toxic.  She was anicteric. She had reactive pupils and her extra-occular movements were preserved.  She did not have thrush. She had a mass on the left side of her tongue. She did not have JVD.  Her lungs were clear and she had non-labored respirations. She had symmetrical chest wall movements.  She had regular heart tones. She did not have a murmur.  Her abdomen was soft and neither distended nor tender. She was obese. She did not have guarding or rebound. There were no palpable masses or abdominal wall hernias. She had healed surgical scars. There was no CVA tenderness and she had active bowel sounds.  Her extremities were well perfused. She did not have a rash.  She had a healed posterior lumbar surgical scar. She had normal strength. Her musculoskeletal exam was otherwise normal.    Laboratory tests revealed a:    WBC	=	6	Na		=	142	Bilirubin		=	0.9  Neutro	=	-	K		=	3.4	Alk Phos	=	113  Hgb	=	9	Cl		=	104	SGOT		=	22  Hct	=	32%	HCO3		=	21	SGPT		=	6  Plts	=	252	Glucose	=	207  			BUN		=	14	Hemoglobin A1-C	=	7.4  PT	=	-	Creat		=	0.5  PTT	=	-  INR	=	-	Albumin	=	4.0    An EKG revealed NSR. She had inverted "T" waves in lead aVR and aVL.    She was electively admitted to the Otolaryngology service and she underwent anesthesia and surgery commencing at approximately 12:30 PM on 5/17/17. She was ASA 3 and underwent general anesthesia via a 7.0 naso-tracheal tube that was easy to insert. Attempts were made to place a radial arterial cannula. A 16 Fr Lange catheter was inserted. During the approximately 6 hours of anesthesia she was given 2,500 ml of crystalloid and one unit (300 ml) of pRBC. She produced 40 ml of urine and was estimated to have lost 100 ml of blood. During the anesthesia she required vasopressor support with ephedrine and norsynephrine. She was given Lasix. Laboratory tests, obtained at approximately 1 PM, during the anesthesia revealed a:    Na		=	139	Hct	=	29%  K		=	3.6  Glucose	=	114    Prior to commencing the operation she was given 600 mg of clindamycin. She underwent a left glossectomy, left neck dissection and tracheostomy. On completion of the anesthesia and surgery she was transferred to the PACU. During the approximately one hour she was in the PACU she had a:    BP	= 134 - 148/45 - 65	P	= 60 - 67		R	= 12 - 17  Temp	= 37			O2 Sat	= 98% - 100%    She was given aspirin, heparin sq, albuterol, IV fluid, xanax, zoloft, Lamictal, Sinemet, Lasix, Insulin, Lopressor, Losartan, Tylenol, Percocet, and Dilaudid. She was subsequently transferred to the SICU where she is now seen.    While in the SICU she has had a:    BP	=	113 - 152/44 - 65  P	=	60 - 80		O2 Sat	=	94% - 100%  R	=	12 - 19		I/O	=	300 in/440 out (5/17 - 5/18 < 24 hours)  Temp	=	36.4 - 37.2			CRISTIAN = 63 ml    Glucose		=	192		Weight		=	83.5 Kg  						BMI		=	38.5    Awake, alert, and interactive. Communicates effectively. In no distress. Anicteric. Pupils reactive with post-operative changes. Extra-occular movements intact.  Small bore NGT in place.  No thrush. Tongue with operative changes. No bleeding.  Tracheostomy in place with some leonie-tracheal bloody secretions. CRISTIAN sero-sanguinous.  Lungs with scattered rhonchi. Clear with coughing.  COR - RRR without a murmur.  Abdomen obese. Neither tender nor distended.  Extremities well perfused.    WBC	=	13  Neutro	=	-  Hgb	=	11  Hct	=	37%  Plts	=	259    A chest radiograph revealed the presence of a small bore NGT that ended below the diaphragm. She had a tracheostomy in place and she had a TAVR. She had bilateral shoulder prosthesis.    Remains on:    1)	NS @ 50 ml/hr										              2)	Lasix 40 mg via NGT q24 hours  3)	Lopressor 25 mg via NGT q12 hours					                            4)	Losartan 20 mg via NGT q24 hours				              	              5)	Aspirin 81 mg po q24 hours							              6)	Synthroid 25 micrograms via NGT q24 hours					                   7)	Zoloft 25 mg via NGT q24 hours							              8)	Lamictal 100 mg via NGT q24 hours						               9)	Xanax 1 mg via NGT qhs							              10)	Sinemet 25/100 via NGT qid							              11)	Insulin  	a)	Lantus 30 units sq qAM  	b)	Sliding scale  12)	Tylenol 650 mg po q6 hours prn							              13)	Percocet 1 - 2 tab po q4 hours prn						              14)	Zofran prn								              15)	Albuterol 2.5 mg via nebulizer q6 hours						              16)	Heparin 5,000 units sq q12 hours						              17)	NPO except medications with sips / chips.					              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 She is stable. Plan to:    1)	Admit her to the SICU (done).							              2)	Clarify medication orders. No need for parenteral fluid and Lasix concurrently. Will indicate reason for each medication and provide hold parameters for antihypertensive medications.										              3)	Commence enteral feedings. Will commence tube feedings and plan for an oral diet in the near future.									              4)	Change the aspirin and percocet to administration via the NGT, not orally. Will give oxycodone not percocet and will give as a solution / liquid.		              5)	Change the heparin to every 8 hours as opposed to q12 hour dosing.			6)	Commence treatment with a statin. Has had a prior CVA (as evidenced by prior CT scan of her brain) and has coronary artery disease. Unclear as to why she was not being treated with a statin.												7)	Allow and assist her to be out of bed.							8)	Will obtain a physical therapy as well as a speech therapy consultation.			9)	Plan expeditious transfer to the otolaryngology floor.					10)	Full support in place.    Viktor Castro May 17th, 2017  10:40 PM    Day of Hospitalization  Day of Operation  Day of SICU Admission  Initial SICU Evaluation    I was asked to evaluate this patient, render a Surgical Critical Care Consultation and to provide ongoing care and monitoring for this patient. Her chart is reviewed and she is examined.    This patient is a 71-year-old obese, hypertensive, insulin requiring type 2 diabetic female who presented to the Otolaryngology service for elective surgery. Approximately one year ago she had been noted to have a mass on her tongue that intermittently bleed. A biopsy reportedly revealed squamous cell carcinoma prompting the current hospitalization. A PET scan, obtained on 5/15/17 revealed right axillary and retropectoral lymph nodes that were avid for FDG and she had activity in the left side of her tongue. There was no definite FDG avid cervical lymphadenopathy.    She has a medical history significant for having hypertension, hypothyroidism, chronic obstructive pulmonary disease, insulin requiring type 2 diabetes mellitus, obesity, and obstructive sleep apnea. 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 She has chronic diastolic heart failure and is known to have moderate pulmonary hypertension. She has had Parkinson's diease and has had TIA's and a right occipital infarction. She has anxiety and depression. She previously she had a detachment of her left retina and has had bilateral lens implants for cataracts. She had an bioprosthetic aortic valve replacement and she has had coronary artery stents inserted in 2013 (into a mid LAD lesion and a proximal and mid right coronary artery.) She has had a cholecystectomy, a hysterectomy, an appendectomy, and bilateral shoulder surgery for rotator cuff injuries. She has had a lumbar fusion on 5/18/10 at [facility]. On 11/22/14 she was evaluated in the ED at [facility] with complaints of having a cough and dyspnea. Because she had a systolic murmur, on 5/1/15 she was hospitalized on the Medical Service at [facility] for a Trans-esophageal echocardiogram. She was found to have aortic stenosis and was hospitalized on the Cardiac Surgery Surgery Service at [facility] from 6/10/15 - 6/12/15 for placement of a TAVR. She had a 23 mm Bliss Rafia valve inserted. She was again evaluated in the ED at [facility] on 10/19/15 when she had complaints of having dyspnea. Apparently the patient left the ED prior to being evaluated. She was hospitalized on the medical service at [facility] from 4/2/16 - 4/6/16 for evaluation and treatment of weakness and loose bowel movements. While hospitalized her diarrhea resolved and she was discharged to home. She was hospitalized on the Medical Service at [facility] from 2/11/17 - 2/15/17 for evaluation and treatment of syncope. She had a "slight troponin leak" and was treated medically. An EEG failed to reveal evidence of the presence of seizures. A transthoracic echocardiogram, obtained on 2/14/17 revealed a global left ventricular ejection fraction of 60% - 65% with grade II diastolic dysfunction. The right ventricular systolic pressures were estimated to be 52 mm Hg, consistent with moderate pulmonary hypertension. Prior to the current hospitalization she took:    1)	Aspirin		2)	Plavix		3)	Lopressor	4)	Losartan                5)	Lasix		6)	Glucophage	7)	Levemir		8)	Synthroid	              9)	Sinemet	10)	Neupro		11)	Albuterol	12)	Xanax                    13)	Zoloft		14)	Lamotrigine	15)	Motrin		16)	Gabapentin                             17)	Vitamin D	18)	Potassium  	  She uses BIPAP nocturnally. She is reportedly allergic to penicillin that causes angioedema and ultram that results in a rash. She is intolerant of morphine. She is reportedly allergic to latex. She does not abuse ethanol and she smoked 1 pack of cigarettes per day for 10 years stopping 30 years ago. Her family history is significant for her father having had cirrhosis. She is  and resides with her spouse, Carmelo France [phone] / [phone] in a private home in [place]. There are four stairs to traverse to enter the resident and there are 10 steps to the bedroom. The patient has an adult daughter, Amelie Elizabeth ([phone]). The patient's daughter and the patient's son-in-law reside upstairs in the same residence as the patient. The patient has previously assigned her spouse to be her healthcare agent / proxy although at other times the patient assigned her daughter to be her healthcare agent / proxy . She has decreased vision and uses corrective lenses. She commonly ambulates with the assistance of a rolling walker and / or a cane. Anesthetics have previously resulted in hallucinations. She denied having had headaches, dizziness, or photophobia. She has not had chest pain, or palpitations but she has dyspnea when climbing a flight of stairs. She denied having had abdominal pain, nausea or emesis, diarrhea or constipation, fever or chills. She has not been jaundiced nor has she had dark urine. She has had post-nasal drip and denied having had bleeding from any area other than her tongue. She has had joint pain, mostly in her back. She has depression and anxiety and denied having had suicidal ideation. Her 10-point review of systems was otherwise negative. Her internist is Dr. Orlin Lucero ([phone]). The patient's cardiologist is Dr. Engel ([phone]).    On presentation to PAT / PST she had a:    BP	=	120/60		P	=	82	R	=	18		              Temp	=	37.4		O2 Sat	=	96%	VAS	=	-    She was awake, alert, and fully oriented. She was in no acute distress and did not appear toxic.  She was anicteric. She had reactive pupils and her extra-occular movements were preserved.  She did not have thrush. She had a mass on the left side of her tongue. She did not have JVD.  Her lungs were clear and she had non-labored respirations. She had symmetrical chest wall movements.  She had regular heart tones. She did not have a murmur.  Her abdomen was soft and neither distended nor tender. She was obese. She did not have guarding or rebound. There were no palpable masses or abdominal wall hernias. She had healed surgical scars. There was no CVA tenderness and she had active bowel sounds.  Her extremities were well perfused. She did not have a rash.  She had a healed posterior lumbar surgical scar. She had normal strength. Her musculoskeletal exam was otherwise normal.    Laboratory tests revealed a:    WBC	=	6	Na		=	142	Bilirubin		=	0.9  Neutro	=	-	K		=	3.4	Alk Phos	=	113  Hgb	=	9	Cl		=	104	SGOT		=	22  Hct	=	32%	HCO3		=	21	SGPT		=	6  Plts	=	252	Glucose	=	207  			BUN		=	14	Hemoglobin A1-C	=	7.4  PT	=	-	Creat		=	0.5  PTT	=	-  INR	=	-	Albumin	=	4.0    An EKG revealed NSR. She had inverted "T" waves in lead aVR and aVL.    She was electively admitted to the Otolaryngology service and she underwent anesthesia and surgery commencing at approximately 12:30 PM on 5/17/17. She was ASA 3 and underwent general anesthesia via a 7.0 naso-tracheal tube that was easy to insert. Attempts were made to place a radial arterial cannula. A 16 Fr Lange catheter was inserted. During the approximately 6 hours of anesthesia she was given 2,500 ml of crystalloid and one unit (300 ml) of pRBC. She produced 40 ml of urine and was estimated to have lost 100 ml of blood. During the anesthesia she required vasopressor support with ephedrine and norsynephrine. She was given Lasix. Laboratory tests, obtained at approximately 1 PM, during the anesthesia revealed a:    Na		=	139	Hct	=	29%  K		=	3.6  Glucose	=	114    Prior to commencing the operation she was given 600 mg of clindamycin. She underwent a left glossectomy, left neck dissection and tracheostomy. On completion of the anesthesia and surgery she was transferred to the PACU. During the approximately one hour she was in the PACU she had a:    BP	= 134 - 148/45 - 65	P	= 60 - 67		R	= 12 - 17  Temp	= 37			O2 Sat	= 98% - 100%    She was given aspirin, heparin sq, albuterol, IV fluid, xanax, zoloft, Lamictal, Sinemet, Lasix, Insulin, Lopressor, Losartan, Tylenol, Percocet, and Dilaudid. She was subsequently transferred to the SICU where she is now seen.    While in the SICU she has had a:    BP	=	113 - 152/44 - 65  P	=	60 - 80		O2 Sat	=	94% - 100%  R	=	12 - 19		I/O	=	300 in/440 out (5/17 - 5/18 < 24 hours)  Temp	=	36.4 - 37.2			CRISTIAN = 63 ml    Glucose		=	192		Weight		=	83.5 Kg  						BMI		=	38.5    Awake, alert, and interactive. Communicates effectively. In no distress. Anicteric. Pupils reactive with post-operative changes. Extra-occular movements intact.  Small bore NGT in place.  No thrush. Tongue with operative changes. No bleeding.  Tracheostomy in place with some leonie-tracheal bloody secretions. CRISTIAN sero-sanguinous.  Lungs with scattered rhonchi. Clear with coughing.  COR - RRR without a murmur.  Abdomen obese. Neither tender nor distended.  Extremities well perfused.    WBC	=	13  Neutro	=	-  Hgb	=	11  Hct	=	37%  Plts	=	259    A chest radiograph revealed the presence of a small bore NGT that ended below the diaphragm. She had a tracheostomy in place and she had a TAVR. She had bilateral shoulder prosthesis.    Remains on:    1)	NS @ 50 ml/hr										              2)	Lasix 40 mg via NGT q24 hours  3)	Lopressor 25 mg via NGT q12 hours					                            4)	Losartan 20 mg via NGT q24 hours				              	              5)	Aspirin 81 mg po q24 hours							              6)	Synthroid 25 micrograms via NGT q24 hours					                   7)	Zoloft 25 mg via NGT q24 hours							              8)	Lamictal 100 mg via NGT q24 hours						               9)	Xanax 1 mg via NGT qhs							              10)	Sinemet 25/100 via NGT qid							              11)	Insulin  	a)	Lantus 30 units sq qAM  	b)	Sliding scale  12)	Tylenol 650 mg po q6 hours prn							              13)	Percocet 1 - 2 tab po q4 hours prn						              14)	Zofran prn								              15)	Albuterol 2.5 mg via nebulizer q6 hours						              16)	Heparin 5,000 units sq q12 hours						              17)	NPO except medications with sips / chips.					              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 She is stable. Plan to:    1)	Admit her to the SICU (done).							              2)	Clarify medication orders. No need for parenteral fluid and Lasix concurrently. Will indicate reason for each medication and provide hold parameters for antihypertensive medications.										              3)	Commence enteral feedings. Will commence tube feedings and plan for an oral diet in the near future.										              4)	Change the aspirin and percocet to administration via the NGT, not orally. Will give oxycodone not percocet and will give as a solution / liquid.			              5)	Change the heparin to every 8 hours as opposed to q12 hour dosing.			6)	Commence treatment with a statin. Has had a prior CVA (as evidenced by prior CT scan of her brain) and has coronary artery disease. Unclear as to why she was not being treated with a statin.												7)	Allow and assist her to be out of bed.							8)	Will obtain a physical therapy as well as a speech therapy consultation.			9)	Plan expeditious transfer to the otolaryngology floor.					10)	Full support in place.    Viktor Castro May 17th, 2017  10:40 PM    Day of Hospitalization  Day of Operation  Day of SICU Admission  Initial SICU Evaluation    I was asked to evaluate this patient, render a Surgical Critical Care Consultation and to provide ongoing care and monitoring for this patient. Her chart is reviewed and she is examined.    This patient is a 71-year-old obese, hypertensive, insulin requiring type 2 diabetic female who presented to the Otolaryngology service for elective surgery. Approximately one year ago she had been noted to have a mass on her tongue that intermittently bleed. A biopsy reportedly revealed squamous cell carcinoma prompting the current hospitalization. A PET scan, obtained on 5/15/17 revealed right axillary and retropectoral lymph nodes that were avid for FDG and she had activity in the left side of her tongue. There was no definite FDG avid cervical lymphadenopathy.    She has a medical history significant for having hypertension, hypothyroidism, chronic obstructive pulmonary disease, insulin requiring type 2 diabetes mellitus, obesity, and obstructive sleep apnea. 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 She has chronic diastolic heart failure and is known to have moderate pulmonary hypertension. She has had Parkinson's diease and has had TIA's and a right occipital infarction. She has anxiety and depression. She previously she had a detachment of her left retina and has had bilateral lens implants for cataracts. She had an bioprosthetic aortic valve replacement and she has had coronary artery stents inserted in 2013 (into a mid LAD lesion and a proximal and mid right coronary artery.) She has had a cholecystectomy, a hysterectomy, an appendectomy, and bilateral shoulder surgery for rotator cuff injuries. She has had a lumbar fusion on 5/18/10 at [facility]. On 11/22/14 she was evaluated in the ED at [facility] with complaints of having a cough and dyspnea. Because she had a systolic murmur, on 5/1/15 she was hospitalized on the Medical Service at [facility] for a Trans-esophageal echocardiogram. She was found to have aortic stenosis and was hospitalized on the Cardiac Surgery Surgery Service at [facility] from 6/10/15 - 6/12/15 for placement of a TAVR. She had a 23 mm Bliss Rafia valve inserted. She was again evaluated in the ED at [facility] on 10/19/15 when she had complaints of having dyspnea. Apparently the patient left the ED prior to being evaluated. She was hospitalized on the medical service at [facility] from 4/2/16 - 4/6/16 for evaluation and treatment of weakness and loose bowel movements. While hospitalized her diarrhea resolved and she was discharged to home. She was hospitalized on the Medical Service at [facility] from 2/11/17 - 2/15/17 for evaluation and treatment of syncope. She had a "slight troponin leak" and was treated medically. An EEG failed to reveal evidence of the presence of seizures. A transthoracic echocardiogram, obtained on 2/14/17 revealed a global left ventricular ejection fraction of 60% - 65% with grade II diastolic dysfunction. The right ventricular systolic pressures were estimated to be 52 mm Hg, consistent with moderate pulmonary hypertension. Prior to the current hospitalization she took:    1)	Aspirin		2)	Plavix		3)	Lopressor	4)	Losartan                5)	Lasix		6)	Glucophage	7)	Levemir		8)	Synthroid	              9)	Sinemet	10)	Neupro		11)	Albuterol	12)	Xanax                    13)	Zoloft		14)	Lamotrigine	15)	Motrin		16)	Gabapentin                             17)	Vitamin D	18)	Potassium  	  She uses BIPAP nocturnally. She is reportedly allergic to penicillin that causes angioedema and ultram that results in a rash. She is intolerant of morphine. She is reportedly allergic to latex. She does not abuse ethanol and she smoked 1 pack of cigarettes per day for 10 years stopping 30 years ago. Her family history is significant for her father having had cirrhosis. She is  and resides with her spouse, Carmelo France [phone] / [phone] in a private home in [place]. There are four stairs to traverse to enter the resident and there are 10 steps to the bedroom. The patient has an adult daughter, Amelie Elizabeth ([phone]). The patient's daughter and the patient's son-in-law reside upstairs in the same residence as the patient. The patient has previously assigned her spouse to be her healthcare agent / proxy although at other times the patient assigned her daughter to be her healthcare agent / proxy . She has decreased vision and uses corrective lenses. She commonly ambulates with the assistance of a rolling walker and / or a cane. Anesthetics have previously resulted in hallucinations. She denied having had headaches, dizziness, or photophobia. She has not had chest pain, or palpitations but she has dyspnea when climbing a flight of stairs. She denied having had abdominal pain, nausea or emesis, diarrhea or constipation, fever or chills. She has not been jaundiced nor has she had dark urine. She has had post-nasal drip and denied having had bleeding from any area other than her tongue. She has had joint pain, mostly in her back. She has depression and anxiety and denied having had suicidal ideation. Her 10-point review of systems was otherwise negative. Her internist is Dr. Orlin Lucero ([phone]). The patient's cardiologist is Dr. Engel ([phone]).    On presentation to PAT / PST she had a:    BP	=	120/60		P	=	82	R	=	18		              Temp	=	37.4		O2 Sat	=	96%	VAS	=	-    She was awake, alert, and fully oriented. She was in no acute distress and did not appear toxic.  She was anicteric. She had reactive pupils and her extra-occular movements were preserved.  She did not have thrush. She had a mass on the left side of her tongue. She did not have JVD.  Her lungs were clear and she had non-labored respirations. She had symmetrical chest wall movements.  She had regular heart tones. She did not have a murmur.  Her abdomen was soft and neither distended nor tender. She was obese. She did not have guarding or rebound. There were no palpable masses or abdominal wall hernias. She had healed surgical scars. There was no CVA tenderness and she had active bowel sounds.  Her extremities were well perfused. She did not have a rash.  She had a healed posterior lumbar surgical scar. She had normal strength. Her musculoskeletal exam was otherwise normal.    Laboratory tests revealed a:    WBC	=	6	Na		=	142	Bilirubin		=	0.9  Neutro	=	-	K		=	3.4	Alk Phos	=	113  Hgb	=	9	Cl		=	104	SGOT		=	22  Hct	=	32%	HCO3		=	21	SGPT		=	6  Plts	=	252	Glucose	=	207  			BUN		=	14	Hemoglobin A1-C	=	7.4  PT	=	-	Creat		=	0.5  PTT	=	-  INR	=	-	Albumin	=	4.0    An EKG revealed NSR. She had inverted "T" waves in lead aVR and aVL.    She was electively admitted to the Otolaryngology service and she underwent anesthesia and surgery commencing at approximately 12:30 PM on 5/17/17. She was ASA 3 and underwent general anesthesia via a 7.0 naso-tracheal tube that was easy to insert. Attempts were made to place a radial arterial cannula. A 16 Fr Lange catheter was inserted. During the approximately 6 hours of anesthesia she was given 2,500 ml of crystalloid and one unit (300 ml) of pRBC. She produced 40 ml of urine and was estimated to have lost 100 ml of blood. During the anesthesia she required vasopressor support with ephedrine and norsynephrine. She was given Lasix. Laboratory tests, obtained at approximately 1 PM, during the anesthesia revealed a:    Na		=	139	Hct	=	29%  K		=	3.6  Glucose	=	114    Prior to commencing the operation she was given 600 mg of clindamycin. She underwent a left glossectomy, left neck dissection and tracheostomy. On completion of the anesthesia and surgery she was transferred to the PACU. During the approximately one hour she was in the PACU she had a:    BP	= 134 - 148/45 - 65	P	= 60 - 67		R	= 12 - 17  Temp	= 37			O2 Sat	= 98% - 100%    She was given aspirin, heparin sq, albuterol, IV fluid, xanax, zoloft, Lamictal, Sinemet, Lasix, Insulin, Lopressor, Losartan, Tylenol, Percocet, and Dilaudid. She was subsequently transferred to the SICU where she is now seen.    While in the SICU she has had a:    BP	=	113 - 152/44 - 65  P	=	60 - 80		O2 Sat	=	94% - 100%  R	=	12 - 19		I/O	=	300 in/440 out (5/17 - 5/18 < 24 hours)  Temp	=	36.4 - 37.2			CRISTIAN = 63 ml    Glucose		=	192		Weight		=	83.5 Kg  						BMI		=	38.5    Awake, alert, and interactive. Communicates effectively. In no distress. Anicteric. Pupils reactive with post-operative changes. Extra-occular movements intact.  Small bore NGT in place.  No thrush. Tongue with operative changes. No bleeding.  Tracheostomy in place with some leonie-tracheal bloody secretions. CRISTIAN sero-sanguinous.  Lungs with scattered rhonchi. Clear with coughing.  COR - RRR without a murmur.  Abdomen obese. Neither tender nor distended.  Extremities well perfused.    WBC	=	13  Neutro	=	-  Hgb	=	11  Hct	=	37%  Plts	=	259    A chest radiograph revealed the presence of a small bore NGT that ended below the diaphragm. She had a tracheostomy in place and she had a TAVR. She had bilateral shoulder prosthesis.    Remains on:    1)	NS @ 50 ml/hr										              2)	Lasix 40 mg via NGT q24 hours  3)	Lopressor 25 mg via NGT q12 hours					                            4)	Losartan 20 mg via NGT q24 hours				              	              5)	Aspirin 81 mg po q24 hours							              6)	Synthroid 25 micrograms via NGT q24 hours					                   7)	Zoloft 25 mg via NGT q24 hours							              8)	Lamictal 100 mg via NGT q24 hours						               9)	Xanax 1 mg via NGT qhs							              10)	Sinemet 25/100 via NGT qid							              11)	Insulin  	a)	Lantus 30 units sq qAM  	b)	Sliding scale  12)	Tylenol 650 mg po q6 hours prn							              13)	Percocet 1 - 2 tab po q4 hours prn						              14)	Zofran prn								              15)	Albuterol 2.5 mg via nebulizer q6 hours						              16)	Heparin 5,000 units sq q12 hours						              17)	NPO except medications with sips / chips.					              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 She is stable. Plan to:    1)	Admit her to the SICU (done).							              2)	Clarify medication orders. No need for parenteral fluid and Lasix concurrently. Will indicate reason for each medication and provide hold parameters for antihypertensive medications.										              3)	Commence enteral feedings. Will commence tube feedings and plan for an oral diet in the near future.										              4)	Change the aspirin and percocet to administration via the NGT, not orally. Will give oxycodone not percocet and will give as a solution / liquid.				              5)	Change the heparin to every 8 hours as opposed to q12 hour dosing.		              6)	Commence treatment with a statin. Has had a prior CVA (as evidenced by prior CT scan of her brain) and has coronary artery disease. Unclear as to why she was not being treated with a statin.												7)	Allow and assist her to be out of bed.							8)	Will obtain a physical therapy as well as a speech therapy consultation.			9)	Plan expeditious transfer to the otolaryngology floor.					10)	Full support in place.    Viktor Castro May 17th, 2017  10:40 PM    Day of Hospitalization  Day of Operation  Day of SICU Admission  Initial SICU Evaluation    I was asked to evaluate this patient, render a Surgical Critical Care Consultation and to provide ongoing care and monitoring for this patient. Her chart is reviewed and she is examined.    This patient is a 71-year-old obese, hypertensive, insulin requiring type 2 diabetic female who presented to the Otolaryngology service for elective surgery. Approximately one year ago she had been noted to have a mass on her tongue that intermittently bleed. A biopsy reportedly revealed squamous cell carcinoma prompting the current hospitalization. A PET scan, obtained on 5/15/17 revealed right axillary and retropectoral lymph nodes that were avid for FDG and she had activity in the left side of her tongue. There was no definite FDG avid cervical lymphadenopathy.    She has a medical history significant for having hypertension, hypothyroidism, chronic obstructive pulmonary disease, insulin requiring type 2 diabetes mellitus, obesity, and obstructive sleep apnea. 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 She has chronic diastolic heart failure and is known to have moderate pulmonary hypertension. She has had Parkinson's diease and has had TIA's and a right occipital infarction. She has anxiety and depression. She previously she had a detachment of her left retina and has had bilateral lens implants for cataracts. She had an bioprosthetic aortic valve replacement and she has had coronary artery stents inserted in 2013 (into a mid LAD lesion and a proximal and mid right coronary artery.) She has had a cholecystectomy, a hysterectomy, an appendectomy, and bilateral shoulder surgery for rotator cuff injuries. She has had a lumbar fusion on 5/18/10 at [facility]. On 11/22/14 she was evaluated in the ED at [facility] with complaints of having a cough and dyspnea. Because she had a systolic murmur, on 5/1/15 she was hospitalized on the Medical Service at [facility] for a Trans-esophageal echocardiogram. She was found to have aortic stenosis and was hospitalized on the Cardiac Surgery Surgery Service at [facility] from 6/10/15 - 6/12/15 for placement of a TAVR. She had a 23 mm Bliss Rafia valve inserted. She was again evaluated in the ED at [facility] on 10/19/15 when she had complaints of having dyspnea. Apparently the patient left the ED prior to being evaluated. She was hospitalized on the medical service at [facility] from 4/2/16 - 4/6/16 for evaluation and treatment of weakness and loose bowel movements. While hospitalized her diarrhea resolved and she was discharged to home. She was hospitalized on the Medical Service at [facility] from 2/11/17 - 2/15/17 for evaluation and treatment of syncope. She had a "slight troponin leak" and was treated medically. An EEG failed to reveal evidence of the presence of seizures. A transthoracic echocardiogram, obtained on 2/14/17 revealed a global left ventricular ejection fraction of 60% - 65% with grade II diastolic dysfunction. The right ventricular systolic pressures were estimated to be 52 mm Hg, consistent with moderate pulmonary hypertension. Prior to the current hospitalization she took:    1)	Aspirin		2)	Plavix		3)	Lopressor	4)	Losartan                5)	Lasix		6)	Glucophage	7)	Levemir		8)	Synthroid	              9)	Sinemet	10)	Neupro		11)	Albuterol	12)	Xanax                    13)	Zoloft		14)	Lamotrigine	15)	Motrin		16)	Gabapentin                             17)	Vitamin D	18)	Potassium  	  She uses BIPAP nocturnally. She is reportedly allergic to penicillin that causes angioedema and ultram that results in a rash. She is intolerant of morphine. She is reportedly allergic to latex. She does not abuse ethanol and she smoked 1 pack of cigarettes per day for 10 years stopping 30 years ago. Her family history is significant for her father having had cirrhosis. She is  and resides with her spouse, Carmelo France [phone] / [phone] in a private home in [place]. There are four stairs to traverse to enter the resident and there are 10 steps to the bedroom. The patient has an adult daughter, Amelie Elizabeth ([phone]). The patient's daughter and the patient's son-in-law reside upstairs in the same residence as the patient. The patient has previously assigned her spouse to be her healthcare agent / proxy although at other times the patient assigned her daughter to be her healthcare agent / proxy . She has decreased vision and uses corrective lenses. She commonly ambulates with the assistance of a rolling walker and / or a cane. Anesthetics have previously resulted in hallucinations. She denied having had headaches, dizziness, or photophobia. She has not had chest pain, or palpitations but she has dyspnea when climbing a flight of stairs. She denied having had abdominal pain, nausea or emesis, diarrhea or constipation, fever or chills. She has not been jaundiced nor has she had dark urine. She has had post-nasal drip and denied having had bleeding from any area other than her tongue. She has had joint pain, mostly in her back. She has depression and anxiety and denied having had suicidal ideation. Her 10-point review of systems was otherwise negative. Her internist is Dr. Orlin Lucero ([phone]). The patient's cardiologist is Dr. Engel ([phone]).    On presentation to PAT / PST she had a:    BP	=	120/60		P	=	82	R	=	18		              Temp	=	37.4		O2 Sat	=	96%	VAS	=	-    She was awake, alert, and fully oriented. She was in no acute distress and did not appear toxic.  She was anicteric. She had reactive pupils and her extra-occular movements were preserved.  She did not have thrush. She had a mass on the left side of her tongue. She did not have JVD.  Her lungs were clear and she had non-labored respirations. She had symmetrical chest wall movements.  She had regular heart tones. She did not have a murmur.  Her abdomen was soft and neither distended nor tender. She was obese. She did not have guarding or rebound. There were no palpable masses or abdominal wall hernias. She had healed surgical scars. There was no CVA tenderness and she had active bowel sounds.  Her extremities were well perfused. She did not have a rash.  She had a healed posterior lumbar surgical scar. She had normal strength. Her musculoskeletal exam was otherwise normal.    Laboratory tests revealed a:    WBC	=	6	Na		=	142	Bilirubin		=	0.9  Neutro	=	-	K		=	3.4	Alk Phos	=	113  Hgb	=	9	Cl		=	104	SGOT		=	22  Hct	=	32%	HCO3		=	21	SGPT		=	6  Plts	=	252	Glucose	=	207  			BUN		=	14	Hemoglobin A1-C	=	7.4  PT	=	-	Creat		=	0.5  PTT	=	-  INR	=	-	Albumin	=	4.0    An EKG revealed NSR. She had inverted "T" waves in lead aVR and aVL.    She was electively admitted to the Otolaryngology service and she underwent anesthesia and surgery commencing at approximately 12:30 PM on 5/17/17. She was ASA 3 and underwent general anesthesia via a 7.0 naso-tracheal tube that was easy to insert. Attempts were made to place a radial arterial cannula. A 16 Fr Lange catheter was inserted. During the approximately 6 hours of anesthesia she was given 2,500 ml of crystalloid and one unit (300 ml) of pRBC. She produced 40 ml of urine and was estimated to have lost 100 ml of blood. During the anesthesia she required vasopressor support with ephedrine and norsynephrine. She was given Lasix. Laboratory tests, obtained at approximately 1 PM, during the anesthesia revealed a:    Na		=	139	Hct	=	29%  K		=	3.6  Glucose	=	114    Prior to commencing the operation she was given 600 mg of clindamycin. She underwent a left glossectomy, left neck dissection and tracheostomy. On completion of the anesthesia and surgery she was transferred to the PACU. During the approximately one hour she was in the PACU she had a:    BP	= 134 - 148/45 - 65	P	= 60 - 67		R	= 12 - 17  Temp	= 37			O2 Sat	= 98% - 100%    She was given aspirin, heparin sq, albuterol, IV fluid, xanax, zoloft, Lamictal, Sinemet, Lasix, Insulin, Lopressor, Losartan, Tylenol, Percocet, and Dilaudid. She was subsequently transferred to the SICU where she is now seen.    While in the SICU she has had a:    BP	=	113 - 152/44 - 65  P	=	60 - 80		O2 Sat	=	94% - 100%  R	=	12 - 19		I/O	=	300 in/440 out (5/17 - 5/18 < 24 hours)  Temp	=	36.4 - 37.2			CRISTIAN = 63 ml    Glucose		=	192		Weight		=	83.5 Kg  						BMI		=	38.5    Awake, alert, and interactive. Communicates effectively. In no distress. Anicteric. Pupils reactive with post-operative changes. Extra-occular movements intact.  Small bore NGT in place.  No thrush. Tongue with operative changes. No bleeding.  Tracheostomy in place with some leonie-tracheal bloody secretions. CRISTIAN sero-sanguinous.  Lungs with scattered rhonchi. Clear with coughing.  COR - RRR without a murmur.  Abdomen obese. Neither tender nor distended.  Extremities well perfused.    WBC	=	13  Neutro	=	-  Hgb	=	11  Hct	=	37%  Plts	=	259    A chest radiograph revealed the presence of a small bore NGT that ended below the diaphragm. She had a tracheostomy in place and she had a TAVR. She had bilateral shoulder prosthesis.    Remains on:    1)	NS @ 50 ml/hr										              2)	Lasix 40 mg via NGT q24 hours  3)	Lopressor 25 mg via NGT q12 hours					                            4)	Losartan 20 mg via NGT q24 hours				              	              5)	Aspirin 81 mg po q24 hours							              6)	Synthroid 25 micrograms via NGT q24 hours					                   7)	Zoloft 25 mg via NGT q24 hours							              8)	Lamictal 100 mg via NGT q24 hours						               9)	Xanax 1 mg via NGT qhs							              10)	Sinemet 25/100 via NGT qid							              11)	Insulin  	a)	Lantus 30 units sq qAM  	b)	Sliding scale  12)	Tylenol 650 mg po q6 hours prn							              13)	Percocet 1 - 2 tab po q4 hours prn						              14)	Zofran prn								              15)	Albuterol 2.5 mg via nebulizer q6 hours						              16)	Heparin 5,000 units sq q12 hours						              17)	NPO except medications with sips / chips.					              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 She is stable. Plan to:    1)	Admit her to the SICU (done).							              2)	Clarify medication orders. No need for parenteral fluid and Lasix concurrently. Will indicate reason for each medication and provide hold parameters for antihypertensive medications.										              3)	Commence enteral feedings. Will commence tube feedings and plan for an oral diet in the near future.										              4)	Change the aspirin and percocet to administration via the NGT, not orally. Will give oxycodone not percocet and will give as a solution / liquid.				              5)	Change the heparin to every 8 hours as opposed to q12 hour dosing.		              6)	Commence treatment with a statin. Has had a prior CVA (as evidenced by prior CT scan of her brain) and has coronary artery disease. Unclear as to why she was not being treated with a statin.											              7)	Allow and assist her to be out of bed.					              8)	Will obtain a physical therapy as well as a speech therapy consultation.	              	9)	Plan expeditious transfer to the otolaryngology floor.					10)	Full support in place.    Viktor Castro May 17th, 2017  10:40 PM    Day of Hospitalization  Day of Operation  Day of SICU Admission  Initial SICU Evaluation    I was asked to evaluate this patient, render a Surgical Critical Care Consultation and to provide ongoing care and monitoring for this patient. Her chart is reviewed and she is examined.    This patient is a 71-year-old obese, hypertensive, insulin requiring type 2 diabetic female who presented to the Otolaryngology service for elective surgery. Approximately one year ago she had been noted to have a mass on her tongue that intermittently bleed. A biopsy reportedly revealed squamous cell carcinoma prompting the current hospitalization. A PET scan, obtained on 5/15/17 revealed right axillary and retropectoral lymph nodes that were avid for FDG and she had activity in the left side of her tongue. There was no definite FDG avid cervical lymphadenopathy.    She has a medical history significant for having hypertension, hypothyroidism, chronic obstructive pulmonary disease, insulin requiring type 2 diabetes mellitus, obesity, and obstructive sleep apnea. 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 She has chronic diastolic heart failure and is known to have moderate pulmonary hypertension. She has had Parkinson's diease and has had TIA's and a right occipital infarction. She has anxiety and depression. She previously she had a detachment of her left retina and has had bilateral lens implants for cataracts. She had an bioprosthetic aortic valve replacement and she has had coronary artery stents inserted in 2013 (into a mid LAD lesion and a proximal and mid right coronary artery.) She has had a cholecystectomy, a hysterectomy, an appendectomy, and bilateral shoulder surgery for rotator cuff injuries. She has had a lumbar fusion on 5/18/10 at [facility]. On 11/22/14 she was evaluated in the ED at [facility] with complaints of having a cough and dyspnea. Because she had a systolic murmur, on 5/1/15 she was hospitalized on the Medical Service at [facility] for a Trans-esophageal echocardiogram. She was found to have aortic stenosis and was hospitalized on the Cardiac Surgery Surgery Service at [facility] from 6/10/15 - 6/12/15 for placement of a TAVR. She had a 23 mm Bliss Rafia valve inserted. She was again evaluated in the ED at [facility] on 10/19/15 when she had complaints of having dyspnea. Apparently the patient left the ED prior to being evaluated. She was hospitalized on the medical service at [facility] from 4/2/16 - 4/6/16 for evaluation and treatment of weakness and loose bowel movements. While hospitalized her diarrhea resolved and she was discharged to home. She was hospitalized on the Medical Service at [facility] from 2/11/17 - 2/15/17 for evaluation and treatment of syncope. She had a "slight troponin leak" and was treated medically. An EEG failed to reveal evidence of the presence of seizures. A transthoracic echocardiogram, obtained on 2/14/17 revealed a global left ventricular ejection fraction of 60% - 65% with grade II diastolic dysfunction. The right ventricular systolic pressures were estimated to be 52 mm Hg, consistent with moderate pulmonary hypertension. Prior to the current hospitalization she took:    1)	Aspirin		2)	Plavix		3)	Lopressor	4)	Losartan                5)	Lasix		6)	Glucophage	7)	Levemir		8)	Synthroid	              9)	Sinemet	10)	Neupro		11)	Albuterol	12)	Xanax                    13)	Zoloft		14)	Lamotrigine	15)	Motrin		16)	Gabapentin                             17)	Vitamin D	18)	Potassium  	  She uses BIPAP nocturnally. She is reportedly allergic to penicillin that causes angioedema and ultram that results in a rash. She is intolerant of morphine. She is reportedly allergic to latex. She does not abuse ethanol and she smoked 1 pack of cigarettes per day for 10 years stopping 30 years ago. Her family history is significant for her father having had cirrhosis. She is  and resides with her spouse, Carmelo France [phone] / [phone] in a private home in [place]. There are four stairs to traverse to enter the resident and there are 10 steps to the bedroom. The patient has an adult daughter, Amelie Elizabeth ([phone]). The patient's daughter and the patient's son-in-law reside upstairs in the same residence as the patient. The patient has previously assigned her spouse to be her healthcare agent / proxy although at other times the patient assigned her daughter to be her healthcare agent / proxy . She has decreased vision and uses corrective lenses. She commonly ambulates with the assistance of a rolling walker and / or a cane. Anesthetics have previously resulted in hallucinations. She denied having had headaches, dizziness, or photophobia. She has not had chest pain, or palpitations but she has dyspnea when climbing a flight of stairs. She denied having had abdominal pain, nausea or emesis, diarrhea or constipation, fever or chills. She has not been jaundiced nor has she had dark urine. She has had post-nasal drip and denied having had bleeding from any area other than her tongue. She has had joint pain, mostly in her back. She has depression and anxiety and denied having had suicidal ideation. Her 10-point review of systems was otherwise negative. Her internist is Dr. Orlin Lucero ([phone]). The patient's cardiologist is Dr. Engel ([phone]).    On presentation to PAT / PST she had a:    BP	=	120/60		P	=	82	R	=	18		              Temp	=	37.4		O2 Sat	=	96%	VAS	=	-    She was awake, alert, and fully oriented. She was in no acute distress and did not appear toxic.  She was anicteric. She had reactive pupils and her extra-occular movements were preserved.  She did not have thrush. She had a mass on the left side of her tongue. She did not have JVD.  Her lungs were clear and she had non-labored respirations. She had symmetrical chest wall movements.  She had regular heart tones. She did not have a murmur.  Her abdomen was soft and neither distended nor tender. She was obese. She did not have guarding or rebound. There were no palpable masses or abdominal wall hernias. She had healed surgical scars. There was no CVA tenderness and she had active bowel sounds.  Her extremities were well perfused. She did not have a rash.  She had a healed posterior lumbar surgical scar. She had normal strength. Her musculoskeletal exam was otherwise normal.    Laboratory tests revealed a:    WBC	=	6	Na		=	142	Bilirubin		=	0.9  Neutro	=	-	K		=	3.4	Alk Phos	=	113  Hgb	=	9	Cl		=	104	SGOT		=	22  Hct	=	32%	HCO3		=	21	SGPT		=	6  Plts	=	252	Glucose	=	207  			BUN		=	14	Hemoglobin A1-C	=	7.4  PT	=	-	Creat		=	0.5  PTT	=	-  INR	=	-	Albumin	=	4.0    An EKG revealed NSR. She had inverted "T" waves in lead aVR and aVL.    She was electively admitted to the Otolaryngology service and she underwent anesthesia and surgery commencing at approximately 12:30 PM on 5/17/17. She was ASA 3 and underwent general anesthesia via a 7.0 naso-tracheal tube that was easy to insert. Attempts were made to place a radial arterial cannula. A 16 Fr Lange catheter was inserted. During the approximately 6 hours of anesthesia she was given 2,500 ml of crystalloid and one unit (300 ml) of pRBC. She produced 40 ml of urine and was estimated to have lost 100 ml of blood. During the anesthesia she required vasopressor support with ephedrine and norsynephrine. She was given Lasix. Laboratory tests, obtained at approximately 1 PM, during the anesthesia revealed a:    Na		=	139	Hct	=	29%  K		=	3.6  Glucose	=	114    Prior to commencing the operation she was given 600 mg of clindamycin. She underwent a left glossectomy, left neck dissection and tracheostomy. On completion of the anesthesia and surgery she was transferred to the PACU. During the approximately one hour she was in the PACU she had a:    BP	= 134 - 148/45 - 65	P	= 60 - 67		R	= 12 - 17  Temp	= 37			O2 Sat	= 98% - 100%    She was given aspirin, heparin sq, albuterol, IV fluid, xanax, zoloft, Lamictal, Sinemet, Lasix, Insulin, Lopressor, Losartan, Tylenol, Percocet, and Dilaudid. She was subsequently transferred to the SICU where she is now seen.    While in the SICU she has had a:    BP	=	113 - 152/44 - 65  P	=	60 - 80		O2 Sat	=	94% - 100%  R	=	12 - 19		I/O	=	300 in/440 out (5/17 - 5/18 < 24 hours)  Temp	=	36.4 - 37.2			CRISTIAN = 63 ml    Glucose		=	192		Weight		=	83.5 Kg  						BMI		=	38.5    Awake, alert, and interactive. Communicates effectively. In no distress. Anicteric. Pupils reactive with post-operative changes. Extra-occular movements intact.  Small bore NGT in place.  No thrush. Tongue with operative changes. No bleeding.  Tracheostomy in place with some leonie-tracheal bloody secretions. CRISTIAN sero-sanguinous.  Lungs with scattered rhonchi. Clear with coughing.  COR - RRR without a murmur.  Abdomen obese. Neither tender nor distended.  Extremities well perfused.    WBC	=	13  Neutro	=	-  Hgb	=	11  Hct	=	37%  Plts	=	259    A chest radiograph revealed the presence of a small bore NGT that ended below the diaphragm. She had a tracheostomy in place and she had a TAVR. She had bilateral shoulder prosthesis.    Remains on:    1)	NS @ 50 ml/hr										              2)	Lasix 40 mg via NGT q24 hours  3)	Lopressor 25 mg via NGT q12 hours					                            4)	Losartan 20 mg via NGT q24 hours				              	              5)	Aspirin 81 mg po q24 hours							              6)	Synthroid 25 micrograms via NGT q24 hours					                   7)	Zoloft 25 mg via NGT q24 hours							              8)	Lamictal 100 mg via NGT q24 hours						               9)	Xanax 1 mg via NGT qhs							              10)	Sinemet 25/100 via NGT qid							              11)	Insulin  	a)	Lantus 30 units sq qAM  	b)	Sliding scale  12)	Tylenol 650 mg po q6 hours prn							              13)	Percocet 1 - 2 tab po q4 hours prn						              14)	Zofran prn								              15)	Albuterol 2.5 mg via nebulizer q6 hours						              16)	Heparin 5,000 units sq q12 hours						              17)	NPO except medications with sips / chips.					              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 She is stable. Plan to:    1)	Admit her to the SICU (done).							              2)	Clarify medication orders. No need for parenteral fluid and Lasix concurrently. Will indicate reason for each medication and provide hold parameters for antihypertensive medications.										              3)	Commence enteral feedings. Will commence tube feedings and plan for an oral diet in the near future.										              4)	Change the aspirin and percocet to administration via the NGT, not orally. Will give oxycodone not percocet and will give as a solution / liquid.				              5)	Change the heparin to every 8 hours as opposed to q12 hour dosing.		              6)	Commence treatment with a statin. Has had a prior CVA (as evidenced by prior CT scan of her brain) and has coronary artery disease. Unclear as to why she was not being treated with a statin.											              7)	Allow and assist her to be out of bed.						              8)	Will obtain a physical therapy as well as a speech therapy consultation.	                            9)	Plan expeditious transfer to the otolaryngology floor.			          10)	Full support in place.    Viktor Castro May 17th, 2017  10:40 PM    Day of Hospitalization  Day of Operation  Day of SICU Admission  Initial SICU Evaluation    I was asked to evaluate this patient, render a Surgical Critical Care Consultation and to provide ongoing care and monitoring for this patient. Her chart is reviewed and she is examined.    This patient is a 71-year-old obese, hypertensive, insulin requiring type 2 diabetic female who presented to the Otolaryngology service for elective surgery. Approximately one year ago she had been noted to have a mass on her tongue that intermittently bleed. A biopsy reportedly revealed squamous cell carcinoma prompting the current hospitalization. A PET scan, obtained on 5/15/17 revealed right axillary and retropectoral lymph nodes that were avid for FDG and she had activity in the left side of her tongue. There was no definite FDG avid cervical lymphadenopathy.    She has a medical history significant for having hypertension, hypothyroidism, chronic obstructive pulmonary disease, insulin requiring type 2 diabetes mellitus, obesity, and obstructive sleep apnea. 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 She has chronic diastolic heart failure and is known to have moderate pulmonary hypertension. She has had Parkinson's diease and has had TIA's and a right occipital infarction. She has anxiety and depression. She previously she had a detachment of her left retina and has had bilateral lens implants for cataracts. She had an bioprosthetic aortic valve replacement and she has had coronary artery stents inserted in 2013 (into a mid LAD lesion and a proximal and mid right coronary artery.) She has had a cholecystectomy, a hysterectomy, an appendectomy, and bilateral shoulder surgery for rotator cuff injuries. She has had a lumbar fusion on 5/18/10 at [facility]. On 11/22/14 she was evaluated in the ED at [facility] with complaints of having a cough and dyspnea. Because she had a systolic murmur, on 5/1/15 she was hospitalized on the Medical Service at [facility] for a Trans-esophageal echocardiogram. She was found to have aortic stenosis and was hospitalized on the Cardiac Surgery Surgery Service at [facility] from 6/10/15 - 6/12/15 for placement of a TAVR. She had a 23 mm Bliss Rafia valve inserted. She was again evaluated in the ED at [facility] on 10/19/15 when she had complaints of having dyspnea. Apparently the patient left the ED prior to being evaluated. She was hospitalized on the medical service at [facility] from 4/2/16 - 4/6/16 for evaluation and treatment of weakness and loose bowel movements. While hospitalized her diarrhea resolved and she was discharged to home. She was hospitalized on the Medical Service at [facility] from 2/11/17 - 2/15/17 for evaluation and treatment of syncope. She had a "slight troponin leak" and was treated medically. An EEG failed to reveal evidence of the presence of seizures. A transthoracic echocardiogram, obtained on 2/14/17 revealed a global left ventricular ejection fraction of 60% - 65% with grade II diastolic dysfunction. The right ventricular systolic pressures were estimated to be 52 mm Hg, consistent with moderate pulmonary hypertension. Prior to the current hospitalization she took:    1)	Aspirin		2)	Plavix		3)	Lopressor	4)	Losartan                5)	Lasix		6)	Glucophage	7)	Levemir		8)	Synthroid	              9)	Sinemet	10)	Neupro		11)	Albuterol	12)	Xanax                    13)	Zoloft		14)	Lamotrigine	15)	Motrin		16)	Gabapentin                             17)	Vitamin D	18)	Potassium  	  She uses BIPAP nocturnally. She is reportedly allergic to penicillin that causes angioedema and ultram that results in a rash. She is intolerant of morphine. She is reportedly allergic to latex. She does not abuse ethanol and she smoked 1 pack of cigarettes per day for 10 years stopping 30 years ago. Her family history is significant for her father having had cirrhosis. She is  and resides with her spouse, Carmelo France [phone] / [phone] in a private home in [place]. There are four stairs to traverse to enter the resident and there are 10 steps to the bedroom. The patient has an adult daughter, Amelie Elizabeth ([phone]). The patient's daughter and the patient's son-in-law reside upstairs in the same residence as the patient. The patient has previously assigned her spouse to be her healthcare agent / proxy although at other times the patient assigned her daughter to be her healthcare agent / proxy . She has decreased vision and uses corrective lenses. She commonly ambulates with the assistance of a rolling walker and / or a cane. Anesthetics have previously resulted in hallucinations. She denied having had headaches, dizziness, or photophobia. She has not had chest pain, or palpitations but she has dyspnea when climbing a flight of stairs. She denied having had abdominal pain, nausea or emesis, diarrhea or constipation, fever or chills. She has not been jaundiced nor has she had dark urine. She has had post-nasal drip and denied having had bleeding from any area other than her tongue. She has had joint pain, mostly in her back. She has depression and anxiety and denied having had suicidal ideation. Her 10-point review of systems was otherwise negative. Her internist is Dr. Orlin Lucero ([phone]). The patient's cardiologist is Dr. Engel ([phone]).    On presentation to PAT / PST she had a:    BP	=	120/60		P	=	82	R	=	18		              Temp	=	37.4		O2 Sat	=	96%	VAS	=	-    She was awake, alert, and fully oriented. She was in no acute distress and did not appear toxic.  She was anicteric. She had reactive pupils and her extra-occular movements were preserved.  She did not have thrush. She had a mass on the left side of her tongue. She did not have JVD.  Her lungs were clear and she had non-labored respirations. She had symmetrical chest wall movements.  She had regular heart tones. She did not have a murmur.  Her abdomen was soft and neither distended nor tender. She was obese. She did not have guarding or rebound. There were no palpable masses or abdominal wall hernias. She had healed surgical scars. There was no CVA tenderness and she had active bowel sounds.  Her extremities were well perfused. She did not have a rash.  She had a healed posterior lumbar surgical scar. She had normal strength. Her musculoskeletal exam was otherwise normal.    Laboratory tests revealed a:    WBC	=	6	Na		=	142	Bilirubin		=	0.9  Neutro	=	-	K		=	3.4	Alk Phos	=	113  Hgb	=	9	Cl		=	104	SGOT		=	22  Hct	=	32%	HCO3		=	21	SGPT		=	6  Plts	=	252	Glucose	=	207  			BUN		=	14	Hemoglobin A1-C	=	7.4  PT	=	-	Creat		=	0.5  PTT	=	-  INR	=	-	Albumin	=	4.0    An EKG revealed NSR. She had inverted "T" waves in lead aVR and aVL.    She was electively admitted to the Otolaryngology service and she underwent anesthesia and surgery commencing at approximately 12:30 PM on 5/17/17. She was ASA 3 and underwent general anesthesia via a 7.0 naso-tracheal tube that was easy to insert. Attempts were made to place a radial arterial cannula. A 16 Fr Lange catheter was inserted. During the approximately 6 hours of anesthesia she was given 2,500 ml of crystalloid and one unit (300 ml) of pRBC. She produced 40 ml of urine and was estimated to have lost 100 ml of blood. During the anesthesia she required vasopressor support with ephedrine and norsynephrine. She was given Lasix. Laboratory tests, obtained at approximately 1 PM, during the anesthesia revealed a:    Na		=	139	Hct	=	29%  K		=	3.6  Glucose	=	114    Prior to commencing the operation she was given 600 mg of clindamycin. She underwent a left glossectomy, left neck dissection and tracheostomy. On completion of the anesthesia and surgery she was transferred to the PACU. During the approximately one hour she was in the PACU she had a:    BP	= 134 - 148/45 - 65	P	= 60 - 67		R	= 12 - 17  Temp	= 37			O2 Sat	= 98% - 100%    She was given aspirin, heparin sq, albuterol, IV fluid, xanax, zoloft, Lamictal, Sinemet, Lasix, Insulin, Lopressor, Losartan, Tylenol, Percocet, and Dilaudid. She was subsequently transferred to the SICU where she is now seen.    While in the SICU she has had a:    BP	=	113 - 152/44 - 65  P	=	60 - 80		O2 Sat	=	94% - 100%  R	=	12 - 19		I/O	=	300 in/440 out (5/17 - 5/18 < 24 hours)  Temp	=	36.4 - 37.2			CRISTIAN = 63 ml    Glucose		=	192		Weight		=	83.5 Kg  						BMI		=	38.5    Awake, alert, and interactive. Communicates effectively. In no distress. Anicteric. Pupils reactive with post-operative changes. Extra-occular movements intact.  Small bore NGT in place.  No thrush. Tongue with operative changes. No bleeding.  Tracheostomy in place with some leonie-tracheal bloody secretions. CRISTIAN sero-sanguinous.  Lungs with scattered rhonchi. Clear with coughing.  COR - RRR without a murmur.  Abdomen obese. Neither tender nor distended.  Extremities well perfused.    WBC	=	13  Neutro	=	-  Hgb	=	11  Hct	=	37%  Plts	=	259    A chest radiograph revealed the presence of a small bore NGT that ended below the diaphragm. She had a tracheostomy in place and she had a TAVR. She had bilateral shoulder prosthesis.    Remains on:    1)	NS @ 50 ml/hr										              2)	Lasix 40 mg via NGT q24 hours  3)	Lopressor 25 mg via NGT q12 hours					                            4)	Losartan 20 mg via NGT q24 hours				              	              5)	Aspirin 81 mg po q24 hours							              6)	Synthroid 25 micrograms via NGT q24 hours					                   7)	Zoloft 25 mg via NGT q24 hours							              8)	Lamictal 100 mg via NGT q24 hours						               9)	Xanax 1 mg via NGT qhs							              10)	Sinemet 25/100 via NGT qid							              11)	Insulin  	a)	Lantus 30 units sq qAM  	b)	Sliding scale  12)	Tylenol 650 mg po q6 hours prn							              13)	Percocet 1 - 2 tab po q4 hours prn						              14)	Zofran prn								              15)	Albuterol 2.5 mg via nebulizer q6 hours						              16)	Heparin 5,000 units sq q12 hours						              17)	NPO except medications with sips / chips.					              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 She is stable. Plan to:    1)	Admit her to the SICU (done).							              2)	Clarify medication orders. No need for parenteral fluid and Lasix concurrently. Will indicate reason for each medication and provide hold parameters for antihypertensive medications.										              3)	Commence enteral feedings. Will commence tube feedings and plan for an oral diet in the near future.										              4)	Change the aspirin and percocet to administration via the NGT, not orally. Will give oxycodone not percocet and will give as a solution / liquid.				              5)	Change the heparin to every 8 hours as opposed to q12 hour dosing.		              6)	Commence treatment with a statin. Has had a prior CVA (as evidenced by prior CT scan of her brain) and has coronary artery disease. Unclear as to why she was not being treated with a statin.											              7)	Allow and assist her to be out of bed.						              8)	Will obtain a physical therapy as well as a speech therapy consultation.	                            9)	Plan expeditious transfer to the otolaryngology floor.				          10)	Full support in place.    Viktor Castro May 17th, 2017  10:40 PM    Day of Hospitalization  Day of Operation  Day of SICU Admission  Initial SICU Evaluation    I was asked to evaluate this patient, render a Surgical Critical Care Consultation and to provide ongoing care and monitoring for this patient. Her chart is reviewed and she is examined.    This patient is a 71-year-old obese, hypertensive, insulin requiring type 2 diabetic female who presented to the Otolaryngology service for elective surgery. Approximately one year ago she had been noted to have a mass on her tongue that intermittently bleed. A biopsy reportedly revealed squamous cell carcinoma prompting the current hospitalization. A PET scan, obtained on 5/15/17 revealed right axillary and retropectoral lymph nodes that were avid for FDG and she had activity in the left side of her tongue. There was no definite FDG avid cervical lymphadenopathy.    She has a medical history significant for having hypertension, hypothyroidism, chronic obstructive pulmonary disease, insulin requiring type 2 diabetes mellitus, obesity, and obstructive sleep apnea. 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 She has chronic diastolic heart failure and is known to have moderate pulmonary hypertension. She has had Parkinson's diease and has had TIA's and a right occipital infarction. She has anxiety and depression. She previously she had a detachment of her left retina and has had bilateral lens implants for cataracts. She had an bioprosthetic aortic valve replacement and she has had coronary artery stents inserted in 2013 (into a mid LAD lesion and a proximal and mid right coronary artery.) She has had a cholecystectomy, a hysterectomy, an appendectomy, and bilateral shoulder surgery for rotator cuff injuries. She has had a lumbar fusion on 5/18/10 at [facility]. On 11/22/14 she was evaluated in the ED at [facility] with complaints of having a cough and dyspnea. Because she had a systolic murmur, on 5/1/15 she was hospitalized on the Medical Service at [facility] for a Trans-esophageal echocardiogram. She was found to have aortic stenosis and was hospitalized on the Cardiac Surgery Surgery Service at [facility] from 6/10/15 - 6/12/15 for placement of a TAVR. She had a 23 mm Bliss Rafia valve inserted. She was again evaluated in the ED at [facility] on 10/19/15 when she had complaints of having dyspnea. Apparently the patient left the ED prior to being evaluated. She was hospitalized on the medical service at [facility] from 4/2/16 - 4/6/16 for evaluation and treatment of weakness and loose bowel movements. While hospitalized her diarrhea resolved and she was discharged to home. She was hospitalized on the Medical Service at [facility] from 2/11/17 - 2/15/17 for evaluation and treatment of syncope. She had a "slight troponin leak" and was treated medically. An EEG failed to reveal evidence of the presence of seizures. A transthoracic echocardiogram, obtained on 2/14/17 revealed a global left ventricular ejection fraction of 60% - 65% with grade II diastolic dysfunction. The right ventricular systolic pressures were estimated to be 52 mm Hg, consistent with moderate pulmonary hypertension. Prior to the current hospitalization she took:    1)	Aspirin		2)	Plavix		3)	Lopressor	4)	Losartan                5)	Lasix		6)	Glucophage	7)	Levemir		8)	Synthroid	              9)	Sinemet	10)	Neupro		11)	Albuterol	12)	Xanax                    13)	Zoloft		14)	Lamotrigine	15)	Motrin		16)	Gabapentin                             17)	Vitamin D	18)	Potassium  	  She uses BIPAP nocturnally. She is reportedly allergic to penicillin that causes angioedema and ultram that results in a rash. She is intolerant of morphine. She is reportedly allergic to latex. She does not abuse ethanol and she smoked 1 pack of cigarettes per day for 10 years stopping 30 years ago. Her family history is significant for her father having had cirrhosis. She is  and resides with her spouse, Carmelo France [phone] / [phone] in a private home in [place]. There are four stairs to traverse to enter the resident and there are 10 steps to the bedroom. The patient has an adult daughter, Amelie Elizabeth ([phone]). The patient's daughter and the patient's son-in-law reside upstairs in the same residence as the patient. The patient has previously assigned her spouse to be her healthcare agent / proxy although at other times the patient assigned her daughter to be her healthcare agent / proxy . She has decreased vision and uses corrective lenses. She commonly ambulates with the assistance of a rolling walker and / or a cane. Anesthetics have previously resulted in hallucinations. She denied having had headaches, dizziness, or photophobia. She has not had chest pain, or palpitations but she has dyspnea when climbing a flight of stairs. She denied having had abdominal pain, nausea or emesis, diarrhea or constipation, fever or chills. She has not been jaundiced nor has she had dark urine. She has had post-nasal drip and denied having had bleeding from any area other than her tongue. She has had joint pain, mostly in her back. She has depression and anxiety and denied having had suicidal ideation. Her 10-point review of systems was otherwise negative. Her internist is Dr. Orlin Lucero ([phone]). The patient's cardiologist is Dr. Engel ([phone]).    On presentation to PAT / PST she had a:    BP	=	120/60		P	=	82	R	=	18		              Temp	=	37.4		O2 Sat	=	96%	VAS	=	-    She was awake, alert, and fully oriented. She was in no acute distress and did not appear toxic.  She was anicteric. She had reactive pupils and her extra-occular movements were preserved.  She did not have thrush. She had a mass on the left side of her tongue. She did not have JVD.  Her lungs were clear and she had non-labored respirations. She had symmetrical chest wall movements.  She had regular heart tones. She did not have a murmur.  Her abdomen was soft and neither distended nor tender. She was obese. She did not have guarding or rebound. There were no palpable masses or abdominal wall hernias. She had healed surgical scars. There was no CVA tenderness and she had active bowel sounds.  Her extremities were well perfused. She did not have a rash.  She had a healed posterior lumbar surgical scar. She had normal strength. Her musculoskeletal exam was otherwise normal.    Laboratory tests revealed a:    WBC	=	6	Na		=	142	Bilirubin		=	0.9  Neutro	=	-	K		=	3.4	Alk Phos	=	113  Hgb	=	9	Cl		=	104	SGOT		=	22  Hct	=	32%	HCO3		=	21	SGPT		=	6  Plts	=	252	Glucose	=	207  			BUN		=	14	Hemoglobin A1-C	=	7.4  PT	=	-	Creat		=	0.5  PTT	=	-  INR	=	-	Albumin	=	4.0    An EKG revealed NSR. She had inverted "T" waves in lead aVR and aVL.    She was electively admitted to the Otolaryngology service and she underwent anesthesia and surgery commencing at approximately 12:30 PM on 5/17/17. She was ASA 3 and underwent general anesthesia via a 7.0 naso-tracheal tube that was easy to insert. Attempts were made to place a radial arterial cannula. A 16 Fr Lange catheter was inserted. During the approximately 6 hours of anesthesia she was given 2,500 ml of crystalloid and one unit (300 ml) of pRBC. She produced 40 ml of urine and was estimated to have lost 100 ml of blood. During the anesthesia she required vasopressor support with ephedrine and norsynephrine. She was given Lasix. Laboratory tests, obtained at approximately 1 PM, during the anesthesia revealed a:    Na		=	139	Hct	=	29%  K		=	3.6  Glucose	=	114    Prior to commencing the operation she was given 600 mg of clindamycin. She underwent a left glossectomy, left neck dissection and tracheostomy. On completion of the anesthesia and surgery she was transferred to the PACU. During the approximately one hour she was in the PACU she had a:    BP	= 134 - 148/45 - 65	P	= 60 - 67		R	= 12 - 17  Temp	= 37			O2 Sat	= 98% - 100%    She was given aspirin, heparin sq, albuterol, IV fluid, xanax, zoloft, Lamictal, Sinemet, Lasix, Insulin, Lopressor, Losartan, Tylenol, Percocet, and Dilaudid. She was subsequently transferred to the SICU where she is now seen.    While in the SICU she has had a:    BP	=	113 - 152/44 - 65  P	=	60 - 80		O2 Sat	=	94% - 100%  R	=	12 - 19		I/O	=	300 in/440 out (5/17 - 5/18 < 24 hours)  Temp	=	36.4 - 37.2			CRISTIAN = 63 ml    Glucose		=	192		Weight		=	83.5 Kg  						BMI		=	38.5    Awake, alert, and interactive. Communicates effectively. In no distress. Anicteric. Pupils reactive with post-operative changes. Extra-occular movements intact.  Small bore NGT in place.  No thrush. Tongue with operative changes. No bleeding.  Tracheostomy in place with some leonie-tracheal bloody secretions. CRISTIAN sero-sanguinous.  Lungs with scattered rhonchi. Clear with coughing.  COR - RRR without a murmur.  Abdomen obese. Neither tender nor distended.  Extremities well perfused.    WBC	=	13  Neutro	=	-  Hgb	=	11  Hct	=	37%  Plts	=	259    A chest radiograph revealed the presence of a small bore NGT that ended below the diaphragm. She had a tracheostomy in place and she had a TAVR. She had bilateral shoulder prosthesis.    Remains on:    1)	NS @ 50 ml/hr										              2)	Lasix 40 mg via NGT q24 hours  3)	Lopressor 25 mg via NGT q12 hours					                            4)	Losartan 20 mg via NGT q24 hours				              	              5)	Aspirin 81 mg po q24 hours							              6)	Synthroid 25 micrograms via NGT q24 hours					                   7)	Zoloft 25 mg via NGT q24 hours							              8)	Lamictal 100 mg via NGT q24 hours						               9)	Xanax 1 mg via NGT qhs							              10)	Sinemet 25/100 via NGT qid							              11)	Insulin  	a)	Lantus 30 units sq qAM  	b)	Sliding scale  12)	Tylenol 650 mg po q6 hours prn							              13)	Percocet 1 - 2 tab po q4 hours prn						              14)	Zofran prn									              15)	Albuterol 2.5 mg via nebulizer q6 hours						              16)	Heparin 5,000 units sq q12 hours						              17)	NPO except medications with sips / chips.					              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 She is stable. Plan to:    1)	Admit her to the SICU (done).							              2)	Clarify medication orders. No need for parenteral fluid and Lasix concurrently. Will indicate reason for each medication and provide hold parameters for antihypertensive medications.										              3)	Commence enteral feedings. Will commence tube feedings and plan for an oral diet in the near future.										              4)	Change the aspirin and percocet to administration via the NGT, not orally. Will give oxycodone not percocet and will give as a solution / liquid.				              5)	Change the heparin to every 8 hours as opposed to q12 hour dosing.		              6)	Commence treatment with a statin. Has had a prior CVA (as evidenced by prior CT scan of her brain) and has coronary artery disease. Unclear as to why she was not being treated with a statin.											              7)	Allow and assist her to be out of bed.						              8)	Will obtain a physical therapy as well as a speech therapy consultation.	                            9)	Plan expeditious transfer to the otolaryngology floor.				          10)	Full support in place.    Viktor Castro

## 2017-05-17 NOTE — CONSULT NOTE ADULT - SUBJECTIVE AND OBJECTIVE BOX
HPI:  70 y/o female with hx of tongue lesion which started approximately 1 year ago which would bleed at times, malignant on biopsy. Now s/p Left Glossectomy, left Neck Dissection  and Tracheostomy 5/17/17      Allergies    latex (Other)  penicillin (Angioedema)  Ultram (Rash)    Intolerances    morphine (Other)      MEDICATIONS  (STANDING):  sodium chloride 0.9%. 1000milliLiter(s) IV Continuous <Continuous>  aspirin enteric coated 81milliGRAM(s) Oral daily  ALBUTerol    0.083% 2.5milliGRAM(s) Nebulizer every 6 hours  ALBUTerol    90 MICROgram(s) HFA Inhaler 1Puff(s) Inhalation every 4 hours  insulin lispro (HumaLOG) corrective regimen sliding scale  SubCutaneous three times a day before meals  losartan 25milliGRAM(s) Oral daily  sertraline 25milliGRAM(s) Oral daily  lamoTRIgine 100milliGRAM(s) Oral daily  carbidopa/levodopa  25/100 1Tablet(s) Oral four times a day  furosemide    Tablet 40milliGRAM(s) Oral daily  levothyroxine 25MICROGram(s) Oral daily  heparin  Injectable 5000Unit(s) SubCutaneous every 12 hours  insulin glargine Injectable (LANTUS) 30Unit(s) SubCutaneous every morning  dextrose 5%. 1000milliLiter(s) IV Continuous <Continuous>    metoprolol 25milliGRAM(s) Oral two times a day    MEDICATIONS  (PRN):  acetaminophen   Tablet. 650milliGRAM(s) Oral every 6 hours PRN Mild Pain (1 - 3)  oxyCODONE  5 mG/acetaminophen 325 mG 1Tablet(s) Oral every 4 hours PRN Moderate Pain (4 - 6)  oxyCODONE  5 mG/acetaminophen 325 mG 2Tablet(s) Oral every 4 hours PRN Severe Pain (7 - 10)  HYDROmorphone  Injectable 0.8milliGRAM(s) IV Push every 10 minutes PRN Severe Pain (7 - 10)  HYDROmorphone  Injectable 0.4milliGRAM(s) IV Push every 10 minutes PRN Moderate Pain (4 - 6)  ondansetron Injectable 4milliGRAM(s) IV Push once PRN Nausea and/or Vomiting  ALPRAZolam 1milliGRAM(s) Oral at bedtime PRN Anti-anxiety      PAST MEDICAL & SURGICAL HISTORY:  Sleep apnea  Tongue cancer  COPD (chronic obstructive pulmonary disease)  Retinal defect, left  Aortic stenosis  Parkinson disease  Anxiety  Diabetes  Hypertension  Hypothyroid  History of total shoulder replacement, right  History of total shoulder replacement, left  Obesity  Stented coronary artery: pt does not remember when she had stent  History of coronary artery stent placement  H/O aortic valve replacement: History of valve replacement (cow, per pt). TAVR approximately 2015 per pt  S/P shoulder surgery: Bilateral Rotator cuff  S/P orthopedic surgery, follow-up exam: back  S/P cholecystectomy  S/P hysterectomy  S/P CABG x 2      FAMILY HISTORY:  Family history of cirrhosis of liver: Father  Family history of cirrhosis of liver      SOCIAL HISTORY:    ADVANCE DIRECTIVES:      N	    ICU Vital Signs Last 24 Hrs  T(C): 37.2, Max: 37.2 (05-17 @ 09:33)  T(F): 99, Max: 99 (05-17 @ 20:00)  HR: 65 (60 - 69)  BP: 147/48 (127/64 - 152/56)  BP(mean): --  ABP: --  ABP(mean): --  RR: 19 (11 - 19)  SpO2: 96% (96% - 100%)    I&O's Detail    I & Os for current day (as of 17 May 2017 20:52)  =============================================  IN:    sodium chloride 0.9%.: 100 ml    Total IN: 100 ml  ---------------------------------------------  OUT:    Indwelling Catheter - Urethral: 100 ml    Bulb: 30 ml    Total OUT: 130 ml  ---------------------------------------------  Total NET: -30 ml        PHYSICAL EXAM:      Constitutional: Normal Appearing     Neck: Tracheotomy ( suture intact) , No active bleeding along the incision site , Left CRISTIAN : Serosanguinous Drainage     Respiratory: On Ventilatory Support : 400/12/7/50% (SIMV)     Cardiovascular: S1 S2 [place], Regular , Rate , Rhythm,No Gallops     Gastrointestinal: Soft Son Distended, Non Tender       Vascular:    Neurological:    Skin:    Lymph Nodes:    Musculoskeletal:    Psychiatric:        LABS                  CAPILLARY BLOOD GLUCOSE  138 (17 May 2017 09:33)        IMPRESSION: No evidence of acute cardiopulmonary disease. Stable exam   without significant change since the previous study..      EKG:    ECHO, US:    RADIOLOGY: HPI:  70 y/o female with hx of tongue lesion which started approximately 1 year ago which would bleed at times, malignant on biopsy. Now s/p Left Glossectomy, left Neck Dissection  and Tracheostomy 5/17/17      Allergies    latex (Other)  penicillin (Angioedema)  Ultram (Rash)    Intolerances    morphine (Other)      MEDICATIONS  (STANDING):  sodium chloride 0.9%. 1000milliLiter(s) IV Continuous <Continuous>  aspirin enteric coated 81milliGRAM(s) Oral daily  ALBUTerol    0.083% 2.5milliGRAM(s) Nebulizer every 6 hours  ALBUTerol    90 MICROgram(s) HFA Inhaler 1Puff(s) Inhalation every 4 hours  insulin lispro (HumaLOG) corrective regimen sliding scale  SubCutaneous three times a day before meals  losartan 25milliGRAM(s) Oral daily  sertraline 25milliGRAM(s) Oral daily  lamoTRIgine 100milliGRAM(s) Oral daily  carbidopa/levodopa  25/100 1Tablet(s) Oral four times a day  furosemide    Tablet 40milliGRAM(s) Oral daily  levothyroxine 25MICROGram(s) Oral daily  heparin  Injectable 5000Unit(s) SubCutaneous every 12 hours  insulin glargine Injectable (LANTUS) 30Unit(s) SubCutaneous every morning  dextrose 5%. 1000milliLiter(s) IV Continuous <Continuous>    metoprolol 25milliGRAM(s) Oral two times a day    MEDICATIONS  (PRN):  acetaminophen   Tablet. 650milliGRAM(s) Oral every 6 hours PRN Mild Pain (1 - 3)  oxyCODONE  5 mG/acetaminophen 325 mG 1Tablet(s) Oral every 4 hours PRN Moderate Pain (4 - 6)  oxyCODONE  5 mG/acetaminophen 325 mG 2Tablet(s) Oral every 4 hours PRN Severe Pain (7 - 10)  HYDROmorphone  Injectable 0.8milliGRAM(s) IV Push every 10 minutes PRN Severe Pain (7 - 10)  HYDROmorphone  Injectable 0.4milliGRAM(s) IV Push every 10 minutes PRN Moderate Pain (4 - 6)  ondansetron Injectable 4milliGRAM(s) IV Push once PRN Nausea and/or Vomiting  ALPRAZolam 1milliGRAM(s) Oral at bedtime PRN Anti-anxiety      PAST MEDICAL & SURGICAL HISTORY:  Sleep apnea  Tongue cancer  COPD (chronic obstructive pulmonary disease)  Retinal defect, left  Aortic stenosis  Parkinson disease  Anxiety  Diabetes  Hypertension  Hypothyroid  History of total shoulder replacement, right  History of total shoulder replacement, left  Obesity  Stented coronary artery: pt does not remember when she had stent  History of coronary artery stent placement  H/O aortic valve replacement: History of valve replacement (cow, per pt). TAVR approximately 2015 per pt  S/P shoulder surgery: Bilateral Rotator cuff  S/P orthopedic surgery, follow-up exam: back  S/P cholecystectomy  S/P hysterectomy  S/P CABG x 2      FAMILY HISTORY:  Family history of cirrhosis of liver: Father  Family history of cirrhosis of liver      SOCIAL HISTORY:    ADVANCE DIRECTIVES:      N	    ICU Vital Signs Last 24 Hrs  T(C): 37.2, Max: 37.2 (05-17 @ 09:33)  T(F): 99, Max: 99 (05-17 @ 20:00)  HR: 65 (60 - 69)  BP: 147/48 (127/64 - 152/56)  BP(mean): --  ABP: --  ABP(mean): --  RR: 19 (11 - 19)  SpO2: 96% (96% - 100%)    I&O's Detail    I & Os for current day (as of 17 May 2017 20:52)  =============================================  IN:    sodium chloride 0.9%.: 100 ml    Total IN: 100 ml  ---------------------------------------------  OUT:    Indwelling Catheter - Urethral: 100 ml    Bulb: 30 ml    Total OUT: 130 ml  ---------------------------------------------  Total NET: -30 ml        PHYSICAL EXAM:      Constitutional: Normal Appearing     Neck: Tracheotomy ( suture intact) , No active bleeding along the incision site , Left CRISTIAN : Serosanguinous Drainage     Respiratory: On Ventilatory Support : 400/12/7/50% (SIMV)     Cardiovascular: S1 S2 [place], Regular , Rate , Rhythm,No Gallops     Gastrointestinal: Soft , Non  Distended, Non Tender , Positive Bowel Sounds     Vascular: Warm , Well perfused Extremities     Neurological: Non Focal     Psychiatric: Awake         LABS  Complete Blood Count in AM (04.04.16 @ 09:23)    WBC Count: 8.83 K/uL    RBC Count: 5.39 M/uL    Hemoglobin: 12.5 g/dL    Hematocrit: 39.3 %    Mean Cell Volume: 72.9 fl    Mean Cell Hemoglobin: 23.2 pg    Mean Cell Hemoglobin Conc: 31.8 g/dL    Red Cell Distrib Width: 17.6 %    Platelet Count - Automated: 266 K/uL        Comprehensive Metabolic Panel (11.22.14 @ 17:52)    Sodium, Serum: 137 mmol/L    Potassium, Serum: 3.2 mmol/L    Chloride, Serum: 96 mmol/L    Carbon Dioxide, Serum: 24.0 mmol/L    Anion Gap, Serum: 17 mmol/L    Blood Urea Nitrogen, Serum: 16.0 mg/dL    Creatinine, Serum: 0.61 mg/dL    Glucose, Serum: 169 mg/dL    Calcium, Total Serum: 10.1 mg/dL    Protein Total, Serum: 7.1 g/dL    Albumin, Serum: 4.0 g/dL    Bilirubin Total, Serum: 0.9 mg/dL    Alkaline Phosphatase, Serum: 105 U/L    Aspartate Aminotransferase (AST/SGOT): 43 U/L    Alanine Aminotransferase (ALT/SGPT): <5 U/L    CAPILLARY BLOOD GLUCOSE  138 (17 May 2017 09:33)    CXR:     IMPRESSION: No evidence of acute cardiopulmonary disease. Stable exam   without significant change since the previous study..     A&P:  70 y/o female with hx of tongue lesion which started approximately 1 year ago which would bleed at times, malignant on biopsy. Now s/p Left Glossectomy, left Neck Dissection  and Tracheostomy 5/17/17    Neuro : On Tylenol, Dilaudid , Percocet for Pain Control, c/w Sertraline , Xanax , Sinemet   Resp : On Mechanical Vent- Monitor , Trach collar trails - Wean   Cardio: On Metoprolol , Losartan   GI:NPO /IVF , Advance as tolerated   : Trend Input and Output , Replete electrolytes as needed   Heme: On ASA, SQH for VTE Prophylaxis  Endo: On Synthroid, ISS , Lantus - Trend FS HPI:  70 y/o female with hx of tongue lesion which started approximately 1 year ago which would bleed at times, malignant on biopsy. Now s/p Left Glossectomy, left Neck Dissection  and Tracheostomy 5/17/17      Allergies    latex (Other)  penicillin (Angioedema)  Ultram (Rash)    Intolerances    morphine (Other)      MEDICATIONS  (STANDING):  sodium chloride 0.9%. 1000milliLiter(s) IV Continuous <Continuous>  aspirin enteric coated 81milliGRAM(s) Oral daily  ALBUTerol    0.083% 2.5milliGRAM(s) Nebulizer every 6 hours  ALBUTerol    90 MICROgram(s) HFA Inhaler 1Puff(s) Inhalation every 4 hours  insulin lispro (HumaLOG) corrective regimen sliding scale  SubCutaneous three times a day before meals  losartan 25milliGRAM(s) Oral daily  sertraline 25milliGRAM(s) Oral daily  lamoTRIgine 100milliGRAM(s) Oral daily  carbidopa/levodopa  25/100 1Tablet(s) Oral four times a day  furosemide    Tablet 40milliGRAM(s) Oral daily  levothyroxine 25MICROGram(s) Oral daily  heparin  Injectable 5000Unit(s) SubCutaneous every 12 hours  insulin glargine Injectable (LANTUS) 30Unit(s) SubCutaneous every morning  dextrose 5%. 1000milliLiter(s) IV Continuous <Continuous>    metoprolol 25milliGRAM(s) Oral two times a day    MEDICATIONS  (PRN):  acetaminophen   Tablet. 650milliGRAM(s) Oral every 6 hours PRN Mild Pain (1 - 3)  oxyCODONE  5 mG/acetaminophen 325 mG 1Tablet(s) Oral every 4 hours PRN Moderate Pain (4 - 6)  oxyCODONE  5 mG/acetaminophen 325 mG 2Tablet(s) Oral every 4 hours PRN Severe Pain (7 - 10)  HYDROmorphone  Injectable 0.8milliGRAM(s) IV Push every 10 minutes PRN Severe Pain (7 - 10)  HYDROmorphone  Injectable 0.4milliGRAM(s) IV Push every 10 minutes PRN Moderate Pain (4 - 6)  ondansetron Injectable 4milliGRAM(s) IV Push once PRN Nausea and/or Vomiting  ALPRAZolam 1milliGRAM(s) Oral at bedtime PRN Anti-anxiety      PAST MEDICAL & SURGICAL HISTORY:  Sleep apnea  Tongue cancer  COPD (chronic obstructive pulmonary disease)  Retinal defect, left  Aortic stenosis  Parkinson disease  Anxiety  Diabetes  Hypertension  Hypothyroid  History of total shoulder replacement, right  History of total shoulder replacement, left  Obesity  Stented coronary artery: pt does not remember when she had stent  History of coronary artery stent placement  H/O aortic valve replacement: History of valve replacement (cow, per pt). TAVR approximately 2015 per pt  S/P shoulder surgery: Bilateral Rotator cuff  S/P orthopedic surgery, follow-up exam: back  S/P cholecystectomy  S/P hysterectomy  S/P CABG x 2      FAMILY HISTORY:  Family history of cirrhosis of liver: Father  Family history of cirrhosis of liver      SOCIAL HISTORY:    ADVANCE DIRECTIVES:      N	    ICU Vital Signs Last 24 Hrs  T(C): 37.2, Max: 37.2 (05-17 @ 09:33)  T(F): 99, Max: 99 (05-17 @ 20:00)  HR: 65 (60 - 69)  BP: 147/48 (127/64 - 152/56)  BP(mean): --  ABP: --  ABP(mean): --  RR: 19 (11 - 19)  SpO2: 96% (96% - 100%)    I&O's Detail    I & Os for current day (as of 17 May 2017 20:52)  =============================================  IN:    sodium chloride 0.9%.: 100 ml    Total IN: 100 ml  ---------------------------------------------  OUT:    Indwelling Catheter - Urethral: 100 ml    Bulb: 30 ml    Total OUT: 130 ml  ---------------------------------------------  Total NET: -30 ml        PHYSICAL EXAM:      Constitutional: Normal Appearing     Neck: Tracheotomy ( suture intact) , No active bleeding along the incision site , Left CRISTIAN : Serosanguinous Drainage     Respiratory: On Ventilatory Support : 400/12/7/50% (SIMV)     Cardiovascular: S1 S2 [place], Regular , Rate , Rhythm,No Gallops     Gastrointestinal: Soft , Non  Distended, Non Tender , Positive Bowel Sounds     Vascular: Warm , Well perfused Extremities     Neurological: Non Focal     Psychiatric: Awake         LABS  Complete Blood Count in AM (04.04.16 @ 09:23)    WBC Count: 8.83 K/uL    RBC Count: 5.39 M/uL    Hemoglobin: 12.5 g/dL    Hematocrit: 39.3 %    Mean Cell Volume: 72.9 fl    Mean Cell Hemoglobin: 23.2 pg    Mean Cell Hemoglobin Conc: 31.8 g/dL    Red Cell Distrib Width: 17.6 %    Platelet Count - Automated: 266 K/uL        Comprehensive Metabolic Panel (11.22.14 @ 17:52)    Sodium, Serum: 137 mmol/L    Potassium, Serum: 3.2 mmol/L    Chloride, Serum: 96 mmol/L    Carbon Dioxide, Serum: 24.0 mmol/L    Anion Gap, Serum: 17 mmol/L    Blood Urea Nitrogen, Serum: 16.0 mg/dL    Creatinine, Serum: 0.61 mg/dL    Glucose, Serum: 169 mg/dL    Calcium, Total Serum: 10.1 mg/dL    Protein Total, Serum: 7.1 g/dL    Albumin, Serum: 4.0 g/dL    Bilirubin Total, Serum: 0.9 mg/dL    Alkaline Phosphatase, Serum: 105 U/L    Aspartate Aminotransferase (AST/SGOT): 43 U/L    Alanine Aminotransferase (ALT/SGPT): <5 U/L    CAPILLARY BLOOD GLUCOSE  138 (17 May 2017 09:33)    CXR:     IMPRESSION: No evidence of acute cardiopulmonary disease. Stable exam   without significant change since the previous study..     A&P:  70 y/o female with hx of tongue lesion which started approximately 1 year ago which would bleed at times, malignant on biopsy. Now s/p Left Glossectomy, left Neck Dissection  and Tracheostomy 5/17/17    Neuro : On Tylenol, Dilaudid , Percocet for Pain Control, c/w Sertraline , Xanax , Sinemet   Resp : Trach collar   Cardio: On Metoprolol , Losartan   GI:NPO /IVF , Advance as tolerated   : Trend Input and Output , Replete electrolytes as needed   Heme: On ASA, SQH for VTE Prophylaxis  Endo: On Synthroid, ISS , Lantus - Trend FS    Discussed With Attending , Admitted to SICU

## 2017-05-17 NOTE — ASU PREOP CHECKLIST - COMMENTS
sip of water synthroid, losarten, metoprolol caridopa levadopa sip of water synthroid, losartan, metoprolol carbidopa levodopa

## 2017-05-18 ENCOUNTER — TRANSCRIPTION ENCOUNTER (OUTPATIENT)
Age: 72
End: 2017-05-18

## 2017-05-18 LAB
BUN SERPL-MCNC: 10 MG/DL — SIGNIFICANT CHANGE UP (ref 7–23)
CALCIUM SERPL-MCNC: 8.9 MG/DL — SIGNIFICANT CHANGE UP (ref 8.4–10.5)
CHLORIDE SERPL-SCNC: 103 MMOL/L — SIGNIFICANT CHANGE UP (ref 98–107)
CO2 SERPL-SCNC: 26 MMOL/L — SIGNIFICANT CHANGE UP (ref 22–31)
CREAT SERPL-MCNC: 0.82 MG/DL — SIGNIFICANT CHANGE UP (ref 0.5–1.3)
GLUCOSE SERPL-MCNC: 195 MG/DL — HIGH (ref 70–99)
HCT VFR BLD CALC: 35.3 % — SIGNIFICANT CHANGE UP (ref 34.5–45)
HGB BLD-MCNC: 10.5 G/DL — LOW (ref 11.5–15.5)
MAGNESIUM SERPL-MCNC: 1.4 MG/DL — LOW (ref 1.6–2.6)
MCHC RBC-ENTMCNC: 22.1 PG — LOW (ref 27–34)
MCHC RBC-ENTMCNC: 29.7 % — LOW (ref 32–36)
MCV RBC AUTO: 74.3 FL — LOW (ref 80–100)
PHOSPHATE SERPL-MCNC: 3.8 MG/DL — SIGNIFICANT CHANGE UP (ref 2.5–4.5)
PLATELET # BLD AUTO: 248 K/UL — SIGNIFICANT CHANGE UP (ref 150–400)
PMV BLD: 10 FL — SIGNIFICANT CHANGE UP (ref 7–13)
POTASSIUM SERPL-MCNC: 4 MMOL/L — SIGNIFICANT CHANGE UP (ref 3.5–5.3)
POTASSIUM SERPL-SCNC: 4 MMOL/L — SIGNIFICANT CHANGE UP (ref 3.5–5.3)
RBC # BLD: 4.75 M/UL — SIGNIFICANT CHANGE UP (ref 3.8–5.2)
RBC # FLD: 15.6 % — HIGH (ref 10.3–14.5)
SODIUM SERPL-SCNC: 144 MMOL/L — SIGNIFICANT CHANGE UP (ref 135–145)
WBC # BLD: 15.79 K/UL — HIGH (ref 3.8–10.5)
WBC # FLD AUTO: 15.79 K/UL — HIGH (ref 3.8–10.5)

## 2017-05-18 PROCEDURE — 71010: CPT | Mod: 26,76

## 2017-05-18 RX ORDER — METOPROLOL TARTRATE 50 MG
25 TABLET ORAL
Qty: 0 | Refills: 0 | Status: DISCONTINUED | OUTPATIENT
Start: 2017-05-18 | End: 2017-05-22

## 2017-05-18 RX ORDER — HEPARIN SODIUM 5000 [USP'U]/ML
5000 INJECTION INTRAVENOUS; SUBCUTANEOUS EVERY 8 HOURS
Qty: 0 | Refills: 0 | Status: DISCONTINUED | OUTPATIENT
Start: 2017-05-18 | End: 2017-05-27

## 2017-05-18 RX ORDER — ACETAMINOPHEN 500 MG
650 TABLET ORAL EVERY 6 HOURS
Qty: 0 | Refills: 0 | Status: DISCONTINUED | OUTPATIENT
Start: 2017-05-18 | End: 2017-05-22

## 2017-05-18 RX ORDER — FUROSEMIDE 40 MG
40 TABLET ORAL DAILY
Qty: 0 | Refills: 0 | Status: DISCONTINUED | OUTPATIENT
Start: 2017-05-18 | End: 2017-05-22

## 2017-05-18 RX ORDER — OXYCODONE HYDROCHLORIDE 5 MG/1
5 TABLET ORAL EVERY 4 HOURS
Qty: 0 | Refills: 0 | Status: DISCONTINUED | OUTPATIENT
Start: 2017-05-18 | End: 2017-05-22

## 2017-05-18 RX ORDER — LOSARTAN POTASSIUM 100 MG/1
25 TABLET, FILM COATED ORAL DAILY
Qty: 0 | Refills: 0 | Status: DISCONTINUED | OUTPATIENT
Start: 2017-05-18 | End: 2017-05-22

## 2017-05-18 RX ORDER — LOSARTAN POTASSIUM 100 MG/1
25 TABLET, FILM COATED ORAL DAILY
Qty: 0 | Refills: 0 | Status: DISCONTINUED | OUTPATIENT
Start: 2017-05-18 | End: 2017-05-18

## 2017-05-18 RX ORDER — SERTRALINE 25 MG/1
25 TABLET, FILM COATED ORAL DAILY
Qty: 0 | Refills: 0 | Status: DISCONTINUED | OUTPATIENT
Start: 2017-05-18 | End: 2017-05-22

## 2017-05-18 RX ORDER — OXYCODONE HYDROCHLORIDE 5 MG/1
10 TABLET ORAL EVERY 4 HOURS
Qty: 0 | Refills: 0 | Status: DISCONTINUED | OUTPATIENT
Start: 2017-05-18 | End: 2017-05-22

## 2017-05-18 RX ORDER — ATORVASTATIN CALCIUM 80 MG/1
20 TABLET, FILM COATED ORAL AT BEDTIME
Qty: 0 | Refills: 0 | Status: DISCONTINUED | OUTPATIENT
Start: 2017-05-18 | End: 2017-05-22

## 2017-05-18 RX ORDER — LEVOTHYROXINE SODIUM 125 MCG
25 TABLET ORAL DAILY
Qty: 0 | Refills: 0 | Status: DISCONTINUED | OUTPATIENT
Start: 2017-05-18 | End: 2017-05-22

## 2017-05-18 RX ORDER — CARBIDOPA AND LEVODOPA 25; 100 MG/1; MG/1
1 TABLET ORAL
Qty: 0 | Refills: 0 | Status: DISCONTINUED | OUTPATIENT
Start: 2017-05-18 | End: 2017-05-22

## 2017-05-18 RX ORDER — ASPIRIN/CALCIUM CARB/MAGNESIUM 324 MG
81 TABLET ORAL DAILY
Qty: 0 | Refills: 0 | Status: DISCONTINUED | OUTPATIENT
Start: 2017-05-18 | End: 2017-05-24

## 2017-05-18 RX ORDER — METOPROLOL TARTRATE 50 MG
25 TABLET ORAL
Qty: 0 | Refills: 0 | Status: DISCONTINUED | OUTPATIENT
Start: 2017-05-18 | End: 2017-05-18

## 2017-05-18 RX ORDER — LAMOTRIGINE 25 MG/1
100 TABLET, ORALLY DISINTEGRATING ORAL DAILY
Qty: 0 | Refills: 0 | Status: DISCONTINUED | OUTPATIENT
Start: 2017-05-18 | End: 2017-05-22

## 2017-05-18 RX ORDER — MAGNESIUM SULFATE 500 MG/ML
2 VIAL (ML) INJECTION ONCE
Qty: 0 | Refills: 0 | Status: COMPLETED | OUTPATIENT
Start: 2017-05-18 | End: 2017-05-18

## 2017-05-18 RX ORDER — ATORVASTATIN CALCIUM 80 MG/1
20 TABLET, FILM COATED ORAL AT BEDTIME
Qty: 0 | Refills: 0 | Status: DISCONTINUED | OUTPATIENT
Start: 2017-05-18 | End: 2017-05-18

## 2017-05-18 RX ADMIN — CARBIDOPA AND LEVODOPA 1 TABLET(S): 25; 100 TABLET ORAL at 01:45

## 2017-05-18 RX ADMIN — ALBUTEROL 2.5 MILLIGRAM(S): 90 AEROSOL, METERED ORAL at 12:05

## 2017-05-18 RX ADMIN — OXYCODONE HYDROCHLORIDE 5 MILLIGRAM(S): 5 TABLET ORAL at 06:44

## 2017-05-18 RX ADMIN — CARBIDOPA AND LEVODOPA 1 TABLET(S): 25; 100 TABLET ORAL at 12:14

## 2017-05-18 RX ADMIN — ATORVASTATIN CALCIUM 20 MILLIGRAM(S): 80 TABLET, FILM COATED ORAL at 23:16

## 2017-05-18 RX ADMIN — Medication 25 MICROGRAM(S): at 06:08

## 2017-05-18 RX ADMIN — CARBIDOPA AND LEVODOPA 1 TABLET(S): 25; 100 TABLET ORAL at 17:13

## 2017-05-18 RX ADMIN — LAMOTRIGINE 100 MILLIGRAM(S): 25 TABLET, ORALLY DISINTEGRATING ORAL at 12:14

## 2017-05-18 RX ADMIN — Medication 2: at 00:45

## 2017-05-18 RX ADMIN — Medication 650 MILLIGRAM(S): at 11:09

## 2017-05-18 RX ADMIN — HEPARIN SODIUM 5000 UNIT(S): 5000 INJECTION INTRAVENOUS; SUBCUTANEOUS at 06:07

## 2017-05-18 RX ADMIN — ALBUTEROL 2.5 MILLIGRAM(S): 90 AEROSOL, METERED ORAL at 04:45

## 2017-05-18 RX ADMIN — Medication 2: at 06:44

## 2017-05-18 RX ADMIN — Medication 40 MILLIGRAM(S): at 06:08

## 2017-05-18 RX ADMIN — Medication 2: at 17:25

## 2017-05-18 RX ADMIN — LOSARTAN POTASSIUM 25 MILLIGRAM(S): 100 TABLET, FILM COATED ORAL at 06:08

## 2017-05-18 RX ADMIN — HEPARIN SODIUM 5000 UNIT(S): 5000 INJECTION INTRAVENOUS; SUBCUTANEOUS at 23:16

## 2017-05-18 RX ADMIN — SERTRALINE 25 MILLIGRAM(S): 25 TABLET, FILM COATED ORAL at 12:16

## 2017-05-18 RX ADMIN — Medication 25 MILLIGRAM(S): at 06:08

## 2017-05-18 RX ADMIN — Medication 50 GRAM(S): at 06:20

## 2017-05-18 RX ADMIN — OXYCODONE HYDROCHLORIDE 5 MILLIGRAM(S): 5 TABLET ORAL at 06:07

## 2017-05-18 RX ADMIN — Medication 25 MILLIGRAM(S): at 17:33

## 2017-05-18 RX ADMIN — CARBIDOPA AND LEVODOPA 1 TABLET(S): 25; 100 TABLET ORAL at 06:09

## 2017-05-18 RX ADMIN — ALBUTEROL 2.5 MILLIGRAM(S): 90 AEROSOL, METERED ORAL at 22:42

## 2017-05-18 RX ADMIN — INSULIN GLARGINE 30 UNIT(S): 100 INJECTION, SOLUTION SUBCUTANEOUS at 09:47

## 2017-05-18 RX ADMIN — Medication 650 MILLIGRAM(S): at 10:39

## 2017-05-18 RX ADMIN — Medication 81 MILLIGRAM(S): at 12:14

## 2017-05-18 RX ADMIN — OXYCODONE HYDROCHLORIDE 5 MILLIGRAM(S): 5 TABLET ORAL at 17:43

## 2017-05-18 RX ADMIN — HEPARIN SODIUM 5000 UNIT(S): 5000 INJECTION INTRAVENOUS; SUBCUTANEOUS at 14:34

## 2017-05-18 RX ADMIN — OXYCODONE HYDROCHLORIDE 5 MILLIGRAM(S): 5 TABLET ORAL at 17:13

## 2017-05-18 RX ADMIN — ALBUTEROL 2.5 MILLIGRAM(S): 90 AEROSOL, METERED ORAL at 16:21

## 2017-05-18 RX ADMIN — CARBIDOPA AND LEVODOPA 1 TABLET(S): 25; 100 TABLET ORAL at 23:16

## 2017-05-18 RX ADMIN — Medication 2: at 12:13

## 2017-05-18 NOTE — PROGRESS NOTE ADULT - SUBJECTIVE AND OBJECTIVE BOX
HPI:  70 y/o female with hx of tongue lesion which started approximately 1 year ago which would bleed at times, malignant on biopsy. Now scheduled for Left Glossectomy, left Neck Dissection  and Tracheostomy 5/17/17. (15 May 2017 14:45)      Allergies    latex (Other)  penicillin (Angioedema)  Ultram (Rash)    Intolerances    morphine (Other)    MEDICATIONS  (STANDING):  ALBUTerol    0.083% 2.5milliGRAM(s) Nebulizer every 6 hours  insulin glargine Injectable (LANTUS) 30Unit(s) SubCutaneous every morning  insulin lispro (HumaLOG) corrective regimen sliding scale  SubCutaneous every 6 hours  aspirin  chewable 81milliGRAM(s) Oral daily  losartan 25milliGRAM(s) Oral daily  metoprolol 25milliGRAM(s) Oral two times a day  carbidopa/levodopa  25/100 1Tablet(s) Oral four times a day  lamoTRIgine 100milliGRAM(s) Oral daily  levothyroxine 25MICROGram(s) Oral daily  sertraline 25milliGRAM(s) Oral daily  furosemide    Tablet 40milliGRAM(s) Oral daily  heparin  Injectable 5000Unit(s) SubCutaneous every 8 hours  atorvastatin 20milliGRAM(s) Oral at bedtime    MEDICATIONS  (PRN):  ondansetron Injectable 4milliGRAM(s) IV Push once PRN Nausea and/or Vomiting  ALPRAZolam 1milliGRAM(s) Oral at bedtime PRN Anti-anxiety  oxyCODONE Solution 5milliGRAM(s) Oral every 4 hours PRN Moderate Pain (4 - 6)  oxyCODONE Solution 10milliGRAM(s) Oral every 4 hours PRN Severe Pain (7 - 10)  acetaminophen    Suspension. 650milliGRAM(s) Oral every 6 hours PRN Mild Pain (1 - 3)    PAST MEDICAL & SURGICAL HISTORY:  Sleep apnea  Tongue cancer  COPD (chronic obstructive pulmonary disease)  Retinal defect, left  Aortic stenosis  Parkinson disease  Anxiety  Diabetes  Hypertension  Hypothyroid  History of total shoulder replacement, right  History of total shoulder replacement, left  Obesity  Stented coronary artery: pt does not remember when she had stent  History of coronary artery stent placement  H/O aortic valve replacement: History of valve replacement (cow, per pt). TAVR approximately 2015 per pt  S/P shoulder surgery: Bilateral Rotator cuff  S/P orthopedic surgery, follow-up exam: back  S/P cholecystectomy  S/P hysterectomy  S/P CABG x 2    FAMILY HISTORY:  Family history of cirrhosis of liver: Father  Family history of cirrhosis of liver    SOCIAL HISTORY:    ADVANCE DIRECTIVES:    OVERNIGHT EVENTS:    Pt was extubated and transferred to SICU for close monitoring . Pt was sating well on Trach Colalrs, No bleeding or increased secretions noted .         VITAL SIGNS:   ICU Vital Signs Last 24 Hrs  T(C): 36.6, Max: 37.2 (05-17 @ 09:33)  T(F): 97.9, Max: 99 (05-17 @ 20:00)  HR: 79 (60 - 81)  BP: 133/50 (113/47 - 156/59)  BP(mean): 71 (59 - 85)  ABP: --  ABP(mean): --  RR: 20 (11 - 24)  SpO2: 96% (94% - 100%)    I&O's Detail    I & Os for current day (as of 18 May 2017 05:36)  =============================================  IN:    sodium chloride 0.9%: 400 ml    Enteral Tube Flush: 40 ml    Total IN: 440 ml  ---------------------------------------------  OUT:    Indwelling Catheter - Urethral: 505 ml    Bulb: 92.5 ml    Total OUT: 597.5 ml  ---------------------------------------------  Total NET: -157.5 ml        PHYSICAL EXAM:      Constitutional: Normal Appearance    Eyes: EOMI, PERRLA    ENT: WNL     Neck: Tarcheostomy , Sutures intact , (L)) CRISTIAN drain : serasanguinous drainage     Respiratory: Clear breath sounds b/l , Normal lung expansion or effort     Cardiovascular: S1S2 heard, RRR, no murmurs , gallops     Gastrointestinal: Soft , non tender, Non distended .    Genitourinary: Lange     Vascular: warm , well perfused     Neurological: WNL , no deficits     Skin: Good turgor     Musculoskeletal: able to move all extremities     Psychiatric: Awake        LABS:                        10.5   15.79 )-----------( 248      ( 18 May 2017 04:10 )             35.3     05-18    144  |  103  |  10  ----------------------------<  195<H>  4.0   |  26  |  0.82    Ca    8.9      18 May 2017 04:10  Phos  3.8     05-18  Mg     1.4     05-18                CBC Full  -  ( 18 May 2017 04:10 )  WBC Count : 15.79 K/uL  Hemoglobin : 10.5 g/dL  Hematocrit : 35.3 %  Platelet Count - Automated : 248 K/uL  Mean Cell Volume : 74.3 fL  Mean Cell Hemoglobin : 22.1 pg  Mean Cell Hemoglobin Concentration : 29.7 %  Auto Neutrophil # : x  Auto Lymphocyte # : x  Auto Monocyte # : x  Auto Eosinophil # : x  Auto Basophil # : x  Auto Neutrophil % : x  Auto Lymphocyte % : x  Auto Monocyte % : x  Auto Eosinophil % : x  Auto Basophil % : x    05-18    144  |  103  |  10  ----------------------------<  195<H>  4.0   |  26  |  0.82    Ca    8.9      18 May 2017 04:10  Phos  3.8     05-18  Mg     1.4     05-18      CAPILLARY BLOOD GLUCOSE  192 (18 May 2017 00:42)          EKG:    ECHO, US:    RADIOLOGY:    ASSESSMENT:  ASSESSMENT:  71y Female with  Malignant neoplasm of base of tongue  Diabetes mellitus  Stented coronary artery  Diabetes  Sleep apnea  Tongue cancer      PLAN:   Neurologic: c/w Tylenol , Oxycodone , Xanax, Sinemet , lamictal   Respiratory: On trach Collar , Albuterol PRN , PT eval, OOB   Cardiovascular: c/w Atorvastatin , Cozaar , metoprolol   Gastrointestinal/Nutrition: NPO, Zofran PRn, Advance diet as tolerated   Renal/Genitourinary: Trend UO, on lasix,   Hematologic: c/w ASA, SQH   Infectious Disease:   Lines/Tubes: PIV , Lange   Endocrine: c/w Synthroid , lantus, ISS       Disposition:  Stable to be listed       Attending Note: HPI:  70 y/o female with hx of tongue lesion which started approximately 1 year ago which would bleed at times, malignant on biopsy. Now scheduled for Left Glossectomy, left Neck Dissection  and Tracheostomy 5/17/17. (15 May 2017 14:45)      Allergies    latex (Other)  penicillin (Angioedema)  Ultram (Rash)    Intolerances    morphine (Other)    MEDICATIONS  (STANDING):  ALBUTerol    0.083% 2.5milliGRAM(s) Nebulizer every 6 hours  insulin glargine Injectable (LANTUS) 30Unit(s) SubCutaneous every morning  insulin lispro (HumaLOG) corrective regimen sliding scale  SubCutaneous every 6 hours  aspirin  chewable 81milliGRAM(s) Oral daily  losartan 25milliGRAM(s) Oral daily  metoprolol 25milliGRAM(s) Oral two times a day  carbidopa/levodopa  25/100 1Tablet(s) Oral four times a day  lamoTRIgine 100milliGRAM(s) Oral daily  levothyroxine 25MICROGram(s) Oral daily  sertraline 25milliGRAM(s) Oral daily  furosemide    Tablet 40milliGRAM(s) Oral daily  heparin  Injectable 5000Unit(s) SubCutaneous every 8 hours  atorvastatin 20milliGRAM(s) Oral at bedtime    MEDICATIONS  (PRN):  ondansetron Injectable 4milliGRAM(s) IV Push once PRN Nausea and/or Vomiting  ALPRAZolam 1milliGRAM(s) Oral at bedtime PRN Anti-anxiety  oxyCODONE Solution 5milliGRAM(s) Oral every 4 hours PRN Moderate Pain (4 - 6)  oxyCODONE Solution 10milliGRAM(s) Oral every 4 hours PRN Severe Pain (7 - 10)  acetaminophen    Suspension. 650milliGRAM(s) Oral every 6 hours PRN Mild Pain (1 - 3)    PAST MEDICAL & SURGICAL HISTORY:  Sleep apnea  Tongue cancer  COPD (chronic obstructive pulmonary disease)  Retinal defect, left  Aortic stenosis  Parkinson disease  Anxiety  Diabetes  Hypertension  Hypothyroid  History of total shoulder replacement, right  History of total shoulder replacement, left  Obesity  Stented coronary artery: pt does not remember when she had stent  History of coronary artery stent placement  H/O aortic valve replacement: History of valve replacement (cow, per pt). TAVR approximately 2015 per pt  S/P shoulder surgery: Bilateral Rotator cuff  S/P orthopedic surgery, follow-up exam: back  S/P cholecystectomy  S/P hysterectomy  S/P CABG x 2    FAMILY HISTORY:  Family history of cirrhosis of liver: Father  Family history of cirrhosis of liver    SOCIAL HISTORY:    ADVANCE DIRECTIVES:    OVERNIGHT EVENTS:    Pt was extubated and transferred to SICU for close monitoring . Pt was sating well on Trach Collar, No bleeding or increased secretions noted .         VITAL SIGNS:   ICU Vital Signs Last 24 Hrs  T(C): 36.6, Max: 37.2 (05-17 @ 09:33)  T(F): 97.9, Max: 99 (05-17 @ 20:00)  HR: 79 (60 - 81)  BP: 133/50 (113/47 - 156/59)  BP(mean): 71 (59 - 85)  ABP: --  ABP(mean): --  RR: 20 (11 - 24)  SpO2: 96% (94% - 100%)    I&O's Detail    I & Os for current day (as of 18 May 2017 05:36)  =============================================  IN:    sodium chloride 0.9%: 400 ml    Enteral Tube Flush: 40 ml    Total IN: 440 ml  ---------------------------------------------  OUT:    Indwelling Catheter - Urethral: 505 ml    Bulb: 92.5 ml    Total OUT: 597.5 ml  ---------------------------------------------  Total NET: -157.5 ml        PHYSICAL EXAM:      Constitutional: Normal Appearance    Eyes: EOMI, PERRLA    ENT: WNL     Neck: Tarcheostomy , Sutures intact , (L)) CRISTIAN drain : serasanguinous drainage     Respiratory: Clear breath sounds b/l , Normal lung expansion or effort     Cardiovascular: S1S2 heard, RRR, no murmurs , gallops     Gastrointestinal: Soft , non tender, Non distended .    Genitourinary: Lange     Vascular: warm , well perfused     Neurological: WNL , no deficits     Skin: Good turgor     Musculoskeletal: able to move all extremities     Psychiatric: Awake        LABS:                        10.5   15.79 )-----------( 248      ( 18 May 2017 04:10 )             35.3     05-18    144  |  103  |  10  ----------------------------<  195<H>  4.0   |  26  |  0.82    Ca    8.9      18 May 2017 04:10  Phos  3.8     05-18  Mg     1.4     05-18                CBC Full  -  ( 18 May 2017 04:10 )  WBC Count : 15.79 K/uL  Hemoglobin : 10.5 g/dL  Hematocrit : 35.3 %  Platelet Count - Automated : 248 K/uL  Mean Cell Volume : 74.3 fL  Mean Cell Hemoglobin : 22.1 pg  Mean Cell Hemoglobin Concentration : 29.7 %  Auto Neutrophil # : x  Auto Lymphocyte # : x  Auto Monocyte # : x  Auto Eosinophil # : x  Auto Basophil # : x  Auto Neutrophil % : x  Auto Lymphocyte % : x  Auto Monocyte % : x  Auto Eosinophil % : x  Auto Basophil % : x    05-18    144  |  103  |  10  ----------------------------<  195<H>  4.0   |  26  |  0.82    Ca    8.9      18 May 2017 04:10  Phos  3.8     05-18  Mg     1.4     05-18      CAPILLARY BLOOD GLUCOSE  192 (18 May 2017 00:42)          EKG:    ECHO, US:    RADIOLOGY:    ASSESSMENT:  ASSESSMENT:  71y Female with  Malignant neoplasm of base of tongue  Diabetes mellitus  Stented coronary artery  Diabetes  Sleep apnea  Tongue cancer      PLAN:   Neurologic: c/w Tylenol , Oxycodone , Xanax, Sinemet , lamictal   Respiratory: On trach Collar , Albuterol PRN , PT eval, OOB   Cardiovascular: c/w Atorvastatin , Cozaar , metoprolol   Gastrointestinal/Nutrition: NPO, Zofran PRn, Advance diet as tolerated   Renal/Genitourinary: Trend UO, on lasix,   Hematologic: c/w ASA, SQH   Infectious Disease:   Lines/Tubes: PIV , Lange   Endocrine: c/w Synthroid , lantus, ISS       Disposition:  Stable to be listed       Attending Note: HPI:  70 y/o female with hx of tongue lesion which started approximately 1 year ago which would bleed at times, malignant on biopsy. Now scheduled for Left Glossectomy, left Neck Dissection  and Tracheostomy 5/17/17. (15 May 2017 14:45)      Allergies    latex (Other)  penicillin (Angioedema)  Ultram (Rash)    Intolerances    morphine (Other)    MEDICATIONS  (STANDING):  ALBUTerol    0.083% 2.5milliGRAM(s) Nebulizer every 6 hours  insulin glargine Injectable (LANTUS) 30Unit(s) SubCutaneous every morning  insulin lispro (HumaLOG) corrective regimen sliding scale  SubCutaneous every 6 hours  aspirin  chewable 81milliGRAM(s) Oral daily  losartan 25milliGRAM(s) Oral daily  metoprolol 25milliGRAM(s) Oral two times a day  carbidopa/levodopa  25/100 1Tablet(s) Oral four times a day  lamoTRIgine 100milliGRAM(s) Oral daily  levothyroxine 25MICROGram(s) Oral daily  sertraline 25milliGRAM(s) Oral daily  furosemide    Tablet 40milliGRAM(s) Oral daily  heparin  Injectable 5000Unit(s) SubCutaneous every 8 hours  atorvastatin 20milliGRAM(s) Oral at bedtime    MEDICATIONS  (PRN):  ondansetron Injectable 4milliGRAM(s) IV Push once PRN Nausea and/or Vomiting  ALPRAZolam 1milliGRAM(s) Oral at bedtime PRN Anti-anxiety  oxyCODONE Solution 5milliGRAM(s) Oral every 4 hours PRN Moderate Pain (4 - 6)  oxyCODONE Solution 10milliGRAM(s) Oral every 4 hours PRN Severe Pain (7 - 10)  acetaminophen    Suspension. 650milliGRAM(s) Oral every 6 hours PRN Mild Pain (1 - 3)    PAST MEDICAL & SURGICAL HISTORY:  Sleep apnea  Tongue cancer  COPD (chronic obstructive pulmonary disease)  Retinal defect, left  Aortic stenosis  Parkinson disease  Anxiety  Diabetes  Hypertension  Hypothyroid  History of total shoulder replacement, right  History of total shoulder replacement, left  Obesity  Stented coronary artery: pt does not remember when she had stent  History of coronary artery stent placement  H/O aortic valve replacement: History of valve replacement (cow, per pt). TAVR approximately 2015 per pt  S/P shoulder surgery: Bilateral Rotator cuff  S/P orthopedic surgery, follow-up exam: back  S/P cholecystectomy  S/P hysterectomy  S/P CABG x 2    FAMILY HISTORY:  Family history of cirrhosis of liver: Father  Family history of cirrhosis of liver    SOCIAL HISTORY:    ADVANCE DIRECTIVES:    OVERNIGHT EVENTS:    Pt was extubated and transferred to SICU for close monitoring . Pt was sating well on Trach Collar, No bleeding or increased secretions noted .         VITAL SIGNS:   ICU Vital Signs Last 24 Hrs  T(C): 36.6, Max: 37.2 (05-17 @ 09:33)  T(F): 97.9, Max: 99 (05-17 @ 20:00)  HR: 79 (60 - 81)  BP: 133/50 (113/47 - 156/59)  BP(mean): 71 (59 - 85)  ABP: --  ABP(mean): --  RR: 20 (11 - 24)  SpO2: 96% (94% - 100%)    I&O's Detail    I & Os for current day (as of 18 May 2017 05:36)  =============================================  IN:    sodium chloride 0.9%: 400 ml    Enteral Tube Flush: 40 ml    Total IN: 440 ml  ---------------------------------------------  OUT:    Indwelling Catheter - Urethral: 505 ml    Bulb: 92.5 ml    Total OUT: 597.5 ml  ---------------------------------------------  Total NET: -157.5 ml        PHYSICAL EXAM:      Constitutional: Normal Appearance    Eyes: EOMI, PERRLA    ENT: WNL     Neck: Tarcheostomy , Sutures intact , (L)) CRISTIAN drain : serasanguinous drainage     Respiratory: Clear breath sounds b/l , Normal lung expansion or effort     Cardiovascular: S1S2 heard, RRR, no murmurs , gallops     Gastrointestinal: Soft , non tender, Non distended .    Genitourinary: Lange     Vascular: warm , well perfused     Neurological: WNL , no deficits     Skin: Good turgor     Musculoskeletal: able to move all extremities     Psychiatric: Awake        LABS:                        10.5   15.79 )-----------( 248      ( 18 May 2017 04:10 )             35.3     05-18    144  |  103  |  10  ----------------------------<  195<H>  4.0   |  26  |  0.82    Ca    8.9      18 May 2017 04:10  Phos  3.8     05-18  Mg     1.4     05-18                CBC Full  -  ( 18 May 2017 04:10 )  WBC Count : 15.79 K/uL  Hemoglobin : 10.5 g/dL  Hematocrit : 35.3 %  Platelet Count - Automated : 248 K/uL  Mean Cell Volume : 74.3 fL  Mean Cell Hemoglobin : 22.1 pg  Mean Cell Hemoglobin Concentration : 29.7 %  Auto Neutrophil # : x  Auto Lymphocyte # : x  Auto Monocyte # : x  Auto Eosinophil # : x  Auto Basophil # : x  Auto Neutrophil % : x  Auto Lymphocyte % : x  Auto Monocyte % : x  Auto Eosinophil % : x  Auto Basophil % : x    05-18    144  |  103  |  10  ----------------------------<  195<H>  4.0   |  26  |  0.82    Ca    8.9      18 May 2017 04:10  Phos  3.8     05-18  Mg     1.4     05-18      CAPILLARY BLOOD GLUCOSE  192 (18 May 2017 00:42)          EKG:    ECHO, US:    RADIOLOGY:    ASSESSMENT:  ASSESSMENT:  71y Female with  Malignant neoplasm of base of tongue  Diabetes mellitus  Stented coronary artery  Diabetes  Sleep apnea  Tongue cancer      PLAN:   Neurologic: c/w Tylenol , Oxycodone , Xanax, Sinemet , lamictal   Respiratory: On trach Collar , Albuterol PRN , PT eval, OOB   Cardiovascular: c/w Atorvastatin , Cozaar , metoprolol   Gastrointestinal/Nutrition: NPO, Zofran PRn, Advance diet as tolerated   Renal/Genitourinary: Trend UO, on lasix,   Hematologic: c/w ASA, SQH   Infectious Disease:   Lines/Tubes: PIV , Lange   Endocrine: c/w Synthroid , lantus, ISS       Disposition:  Stable to be listed

## 2017-05-19 LAB
BUN SERPL-MCNC: 10 MG/DL — SIGNIFICANT CHANGE UP (ref 7–23)
CA-I BLD-SCNC: 1.07 MMOL/L — SIGNIFICANT CHANGE UP (ref 1.03–1.23)
CALCIUM SERPL-MCNC: 9.1 MG/DL — SIGNIFICANT CHANGE UP (ref 8.4–10.5)
CHLORIDE SERPL-SCNC: 101 MMOL/L — SIGNIFICANT CHANGE UP (ref 98–107)
CO2 SERPL-SCNC: 24 MMOL/L — SIGNIFICANT CHANGE UP (ref 22–31)
CREAT SERPL-MCNC: 0.59 MG/DL — SIGNIFICANT CHANGE UP (ref 0.5–1.3)
GLUCOSE SERPL-MCNC: 194 MG/DL — HIGH (ref 70–99)
HCT VFR BLD CALC: 33.6 % — LOW (ref 34.5–45)
HGB BLD-MCNC: 9.8 G/DL — LOW (ref 11.5–15.5)
MAGNESIUM SERPL-MCNC: 2 MG/DL — SIGNIFICANT CHANGE UP (ref 1.6–2.6)
MCHC RBC-ENTMCNC: 21.9 PG — LOW (ref 27–34)
MCHC RBC-ENTMCNC: 29.2 % — LOW (ref 32–36)
MCV RBC AUTO: 75 FL — LOW (ref 80–100)
PHOSPHATE SERPL-MCNC: 2.6 MG/DL — SIGNIFICANT CHANGE UP (ref 2.5–4.5)
PLATELET # BLD AUTO: 212 K/UL — SIGNIFICANT CHANGE UP (ref 150–400)
PMV BLD: 10.1 FL — SIGNIFICANT CHANGE UP (ref 7–13)
POTASSIUM SERPL-MCNC: 3.6 MMOL/L — SIGNIFICANT CHANGE UP (ref 3.5–5.3)
POTASSIUM SERPL-SCNC: 3.6 MMOL/L — SIGNIFICANT CHANGE UP (ref 3.5–5.3)
RBC # BLD: 4.48 M/UL — SIGNIFICANT CHANGE UP (ref 3.8–5.2)
RBC # FLD: 15.9 % — HIGH (ref 10.3–14.5)
SODIUM SERPL-SCNC: 141 MMOL/L — SIGNIFICANT CHANGE UP (ref 135–145)
WBC # BLD: 12.44 K/UL — HIGH (ref 3.8–10.5)
WBC # FLD AUTO: 12.44 K/UL — HIGH (ref 3.8–10.5)

## 2017-05-19 PROCEDURE — 99233 SBSQ HOSP IP/OBS HIGH 50: CPT

## 2017-05-19 RX ORDER — POTASSIUM PHOSPHATE, MONOBASIC POTASSIUM PHOSPHATE, DIBASIC 236; 224 MG/ML; MG/ML
15 INJECTION, SOLUTION INTRAVENOUS ONCE
Qty: 0 | Refills: 0 | Status: COMPLETED | OUTPATIENT
Start: 2017-05-19 | End: 2017-05-19

## 2017-05-19 RX ADMIN — LOSARTAN POTASSIUM 25 MILLIGRAM(S): 100 TABLET, FILM COATED ORAL at 06:02

## 2017-05-19 RX ADMIN — CARBIDOPA AND LEVODOPA 1 TABLET(S): 25; 100 TABLET ORAL at 06:02

## 2017-05-19 RX ADMIN — LAMOTRIGINE 100 MILLIGRAM(S): 25 TABLET, ORALLY DISINTEGRATING ORAL at 12:19

## 2017-05-19 RX ADMIN — Medication 25 MICROGRAM(S): at 06:02

## 2017-05-19 RX ADMIN — ALBUTEROL 2.5 MILLIGRAM(S): 90 AEROSOL, METERED ORAL at 03:59

## 2017-05-19 RX ADMIN — HEPARIN SODIUM 5000 UNIT(S): 5000 INJECTION INTRAVENOUS; SUBCUTANEOUS at 13:52

## 2017-05-19 RX ADMIN — INSULIN GLARGINE 30 UNIT(S): 100 INJECTION, SOLUTION SUBCUTANEOUS at 09:33

## 2017-05-19 RX ADMIN — Medication 25 MILLIGRAM(S): at 17:43

## 2017-05-19 RX ADMIN — ALBUTEROL 2.5 MILLIGRAM(S): 90 AEROSOL, METERED ORAL at 09:27

## 2017-05-19 RX ADMIN — HEPARIN SODIUM 5000 UNIT(S): 5000 INJECTION INTRAVENOUS; SUBCUTANEOUS at 06:02

## 2017-05-19 RX ADMIN — ATORVASTATIN CALCIUM 20 MILLIGRAM(S): 80 TABLET, FILM COATED ORAL at 21:01

## 2017-05-19 RX ADMIN — Medication 1 MILLIGRAM(S): at 21:00

## 2017-05-19 RX ADMIN — SERTRALINE 25 MILLIGRAM(S): 25 TABLET, FILM COATED ORAL at 12:19

## 2017-05-19 RX ADMIN — ALBUTEROL 2.5 MILLIGRAM(S): 90 AEROSOL, METERED ORAL at 21:42

## 2017-05-19 RX ADMIN — CARBIDOPA AND LEVODOPA 1 TABLET(S): 25; 100 TABLET ORAL at 12:19

## 2017-05-19 RX ADMIN — POTASSIUM PHOSPHATE, MONOBASIC POTASSIUM PHOSPHATE, DIBASIC 62.5 MILLIMOLE(S): 236; 224 INJECTION, SOLUTION INTRAVENOUS at 06:02

## 2017-05-19 RX ADMIN — CARBIDOPA AND LEVODOPA 1 TABLET(S): 25; 100 TABLET ORAL at 17:43

## 2017-05-19 RX ADMIN — ALBUTEROL 2.5 MILLIGRAM(S): 90 AEROSOL, METERED ORAL at 15:57

## 2017-05-19 RX ADMIN — Medication: at 06:42

## 2017-05-19 RX ADMIN — Medication 81 MILLIGRAM(S): at 12:18

## 2017-05-19 RX ADMIN — Medication 25 MILLIGRAM(S): at 06:02

## 2017-05-19 RX ADMIN — Medication 40 MILLIGRAM(S): at 06:02

## 2017-05-19 RX ADMIN — HEPARIN SODIUM 5000 UNIT(S): 5000 INJECTION INTRAVENOUS; SUBCUTANEOUS at 21:01

## 2017-05-19 NOTE — PROGRESS NOTE ADULT - SUBJECTIVE AND OBJECTIVE BOX
SICU Progress Note  =====================================================  Interval/Overnight Events:     HPI: 71F s/p left partial glossectomy, Left neck dissection, tracheostomy for SCC tongue    HPI:  72 y/o female with hx of tongue lesion which started approximately 1 year ago which would bleed at times, malignant on biopsy. Now scheduled for Left Glossectomy, left Neck Dissection  and Tracheostomy 5/17/17. (15 May 2017 14:45)      HISTORY  71y Female  Allergies:  latex (Other)  morphine (Other)  penicillin (Angioedema)  Ultram (Rash)    Problems:   Malignant neoplasm of base of tongue  Sleep apnea  Tongue cancer  COPD (chronic obstructive pulmonary disease)  Retinal defect, left  Aortic stenosis  Parkinson disease  Anxiety  Diabetes  Hypertension  Hypothyroid  Diabetes mellitus  Stented coronary artery  Diabetes  Sleep apnea  Tongue cancer  History of total shoulder replacement, right  History of total shoulder replacement, left  Obesity  Stented coronary artery  History of coronary artery stent placement  H/O aortic valve replacement  S/P shoulder surgery  S/P orthopedic surgery, follow-up exam  S/P cholecystectomy  S/P hysterectomy  S/P CABG x 2      MEDICATIONS:   --------------------------------------------------------------------------------------  Neurologic Medications:  ondansetron Injectable 4milliGRAM(s) IV Push once PRN  ALPRAZolam 1milliGRAM(s) Oral at bedtime PRN  carbidopa/levodopa  25/100 1Tablet(s) Oral four times a day  lamoTRIgine 100milliGRAM(s) Oral daily  oxyCODONE Solution 5milliGRAM(s) Oral every 4 hours PRN  oxyCODONE Solution 10milliGRAM(s) Oral every 4 hours PRN  acetaminophen    Suspension. 650milliGRAM(s) Oral every 6 hours PRN  sertraline 25milliGRAM(s) Oral daily    Respiratory Medications:  ALBUTerol    0.083% 2.5milliGRAM(s) Nebulizer every 6 hours    Cardiovascular Medications:  losartan 25milliGRAM(s) Oral daily  metoprolol 25milliGRAM(s) Oral two times a day  furosemide    Tablet 40milliGRAM(s) Oral daily    Gastrointestinal Medications:    Genitourinary Medications:    Hematologic/Oncologic Medications:  aspirin  chewable 81milliGRAM(s) Oral daily  heparin  Injectable 5000Unit(s) SubCutaneous every 8 hours    Antimicrobials/Immunologic Medications:    Endocrine/Metabolic Medications:  insulin glargine Injectable (LANTUS) 30Unit(s) SubCutaneous every morning  insulin lispro (HumaLOG) corrective regimen sliding scale  SubCutaneous every 6 hours  levothyroxine 25MICROGram(s) Oral daily  atorvastatin 20milliGRAM(s) Oral at bedtime    Topical/Other Medications:    --------------------------------------------------------------------------------------    VITAL SIGNS, INS/OUTS (last 24 hours):  --------------------------------------------------------------------------------------  T(C): 37.3, Max: 37.6 (05-18 @ 16:00)  HR: 86 (66 - 775)  BP: 133/55 (98/40 - 145/47)  ABP: --  ABP(mean): --  RR: 24 (19 - 26)  SpO2: 94% (93% - 100%)  Wt(kg): --  CVP(mm Hg): --    I & Os for 24h ending 05-18 @ 07:00  =============================================  IN:    sodium chloride 0.9%: 400 ml    Enteral Tube Flush: 140 ml    IV PiggyBack: 50 ml    Total IN: 590 ml  ---------------------------------------------  OUT:    Indwelling Catheter - Urethral: 605 ml    Bulb: 122.5 ml    Total OUT: 727.5 ml  ---------------------------------------------  Total NET: -137.5 ml    I & Os for current day (as of 05-19 @ 04:09)  =============================================  IN:    Glucerna: 514 ml    Enteral Tube Flush: 180 ml    Total IN: 694 ml  ---------------------------------------------  OUT:    Indwelling Catheter - Urethral: 600 ml    Voided: 100 ml    Bulb: 50 ml    Total OUT: 750 ml  ---------------------------------------------  Total NET: -56 ml    --------------------------------------------------------------------------------------    EXAM:     NEUROLOGY:  RASS:   GCS:   Exam: Normal, alert, NAD, no focal deficits. FLORENCE.  [x] Adequacy of sedation and pain control has been assessed and adjusted    RESPIRATORY:  Exam: Lungs clear to auscultation, Normal expansion/effort.   [] Tracheostomy   [] Intubated  Mechanical Ventilation:   [x] Extubation Readiness Assessed    CARDIOVASCULAR:  Exam: S1, S2.  Regular rate and rhythm.  Peripheral edema  Cardiac Rhythm: Normal Sinus Rhythm  ECHO:     GI/NUTRITION:  Exam: Abdomen soft, Non-tender, Non-distended.  Gastrostomy / Jejunostomy tube in place.  Nasogastric tube in place.  Colostomy / Ileostomy.    Wound:   Current Diet:     METABOLIC/FLUIDS/ELECTROLYTES:       HEMATOLOGIC:  [x] DVT Prophylaxis: aspirin  chewable 81milliGRAM(s) Oral daily  heparin  Injectable 5000Unit(s) SubCutaneous every 8 hours    Transfusions:	[] PRBC	[] Platelets		[] FFP	[] Cryoprecipitate    INFECTIOUS DISEASE:  Antimicrobials/Immunologic Medications:      Tubes/Lines/Drains:  [x] Peripheral IV  [] Central Venous Line		[] R	[] L	[] IJ	[] Fem	[] SC	Date Placed:   [] Arterial Line		[] R	[] L	[] Fem	[] Rad	[] Ax	Date Placed:   [] PICC:			[] Midline		[] Mediport  [] Urinary Catheter	Date Placed:   [x] Necessity of urinary, arterial, and venous catheters discussed    VASCULAR:  Exam: Extremities warm, pink, well-perfused.    MUSCULOSKELETAL:   Exam: All extremities moving spontaneously without limitations    SKIN:   Exam: Good skin turgor, no skin breakdown.     LABS:   --------------------------------------------------------------------------------------                                            9.8                   Neurophils% (auto):   x      (05-19 @ 03:10):    12.44)-----------(212          Lymphocytes% (auto):  x                                             33.6                   Eosinphils% (auto):   x        Manual%: Neutrophils x    ; Lymphocytes x    ; Eosinophils x    ; Bands%: x    ; Blasts x          05-18    144  |  103  |  10  ----------------------------<  195<H>  4.0   |  26  |  0.82    Ca    8.9      18 May 2017 04:10  Phos  3.8     05-18  Mg     1.4     05-18            RECENT CULTURES:      --------------------------------------------------------------------------------------    OTHER LABORATORY:     IMAGING STUDIES:   CXR:     ASSESSMENT:  71F s/p left partial glossectomy, Left neck dissection, tracheostomy for SCC tongue    PLAN:   Neurologic: oxycodone solution for pain  Respiratory: continue trach collar  Cardiovascular: metoprolol and losartan for hypertension  Gastrointestinal/Nutrition: NPO with tube feeds  Renal/Genitourinary: Lasix for diuresis  Hematologic: SQH for DVT prophylaxis  Infectious Disease: no infectious disease issues  Tubes/Lines/Drains: trach, PIV, CRISTIAN drain  Endocrine: Lantus and ISS for DM, synthroid for hyperthyroidism  Disposition: transfer to the floor    -------------------------------------------------------------------------------------- SICU Progress Note  =====================================================  Interval/Overnight Events:     HPI: 71F s/p left partial glossectomy, Left neck dissection, tracheostomy for SCC tongue    HPI:  72 y/o female with hx of tongue lesion which started approximately 1 year ago which would bleed at times, malignant on biopsy. Now scheduled for Left Glossectomy, left Neck Dissection  and Tracheostomy 5/17/17. (15 May 2017 14:45)      HISTORY  71y Female  Allergies:  latex (Other)  morphine (Other)  penicillin (Angioedema)  Ultram (Rash)    Problems:   Malignant neoplasm of base of tongue  Sleep apnea  Tongue cancer  COPD (chronic obstructive pulmonary disease)  Retinal defect, left  Aortic stenosis  Parkinson disease  Anxiety  Diabetes  Hypertension  Hypothyroid  Diabetes mellitus  Stented coronary artery  Diabetes  Sleep apnea  Tongue cancer  History of total shoulder replacement, right  History of total shoulder replacement, left  Obesity  Stented coronary artery  History of coronary artery stent placement  H/O aortic valve replacement  S/P shoulder surgery  S/P orthopedic surgery, follow-up exam  S/P cholecystectomy  S/P hysterectomy  S/P CABG x 2      MEDICATIONS:   --------------------------------------------------------------------------------------  Neurologic Medications:  ondansetron Injectable 4milliGRAM(s) IV Push once PRN  ALPRAZolam 1milliGRAM(s) Oral at bedtime PRN  carbidopa/levodopa  25/100 1Tablet(s) Oral four times a day  lamoTRIgine 100milliGRAM(s) Oral daily  oxyCODONE Solution 5milliGRAM(s) Oral every 4 hours PRN  oxyCODONE Solution 10milliGRAM(s) Oral every 4 hours PRN  acetaminophen    Suspension. 650milliGRAM(s) Oral every 6 hours PRN  sertraline 25milliGRAM(s) Oral daily    Respiratory Medications:  ALBUTerol    0.083% 2.5milliGRAM(s) Nebulizer every 6 hours    Cardiovascular Medications:  losartan 25milliGRAM(s) Oral daily  metoprolol 25milliGRAM(s) Oral two times a day  furosemide    Tablet 40milliGRAM(s) Oral daily    Gastrointestinal Medications:    Genitourinary Medications:    Hematologic/Oncologic Medications:  aspirin  chewable 81milliGRAM(s) Oral daily  heparin  Injectable 5000Unit(s) SubCutaneous every 8 hours    Antimicrobials/Immunologic Medications:    Endocrine/Metabolic Medications:  insulin glargine Injectable (LANTUS) 30Unit(s) SubCutaneous every morning  insulin lispro (HumaLOG) corrective regimen sliding scale  SubCutaneous every 6 hours  levothyroxine 25MICROGram(s) Oral daily  atorvastatin 20milliGRAM(s) Oral at bedtime    Topical/Other Medications:    --------------------------------------------------------------------------------------    VITAL SIGNS, INS/OUTS (last 24 hours):  --------------------------------------------------------------------------------------  T(C): 37.3, Max: 37.6 (05-18 @ 16:00)  HR: 86 (66 - 775)  BP: 133/55 (98/40 - 145/47)  ABP: --  ABP(mean): --  RR: 24 (19 - 26)  SpO2: 94% (93% - 100%)  Wt(kg): --  CVP(mm Hg): --    I & Os for 24h ending 05-18 @ 07:00  =============================================  IN:    sodium chloride 0.9%: 400 ml    Enteral Tube Flush: 140 ml    IV PiggyBack: 50 ml    Total IN: 590 ml  ---------------------------------------------  OUT:    Indwelling Catheter - Urethral: 605 ml    Bulb: 122.5 ml    Total OUT: 727.5 ml  ---------------------------------------------  Total NET: -137.5 ml    I & Os for current day (as of 05-19 @ 04:09)  =============================================  IN:    Glucerna: 514 ml    Enteral Tube Flush: 180 ml    Total IN: 694 ml  ---------------------------------------------  OUT:    Indwelling Catheter - Urethral: 600 ml    Voided: 100 ml    Bulb: 50 ml    Total OUT: 750 ml  ---------------------------------------------  Total NET: -56 ml    --------------------------------------------------------------------------------------    EXAM:     NEUROLOGY:  RASS:   GCS:   Exam: Normal, alert, NAD, no focal deficits. FLORENCE.  [x] Adequacy of sedation and pain control has been assessed and adjusted    RESPIRATORY:  Exam: Lungs clear to auscultation, Normal expansion/effort.   [x] Tracheostomy   [] Intubated  Mechanical Ventilation:   [x] Extubation Readiness Assessed    CARDIOVASCULAR:  Exam: S1, S2.  Regular rate and rhythm.  Peripheral edema  Cardiac Rhythm: Normal Sinus Rhythm    GI/NUTRITION:  Exam: Abdomen soft, Non-tender, Non-distended.  Gastrostomy / Jejunostomy tube in place.  Nasogastric tube in place.  Colostomy / Ileostomy.    Wound:   Current Diet:     METABOLIC/FLUIDS/ELECTROLYTES:       HEMATOLOGIC:  [x] DVT Prophylaxis: aspirin  chewable 81milliGRAM(s) Oral daily  heparin  Injectable 5000Unit(s) SubCutaneous every 8 hours    Transfusions:	[] PRBC	[] Platelets		[] FFP	[] Cryoprecipitate    INFECTIOUS DISEASE:  Antimicrobials/Immunologic Medications:      Tubes/Lines/Drains:  [x] Peripheral IV  [] Central Venous Line		[] R	[] L	[] IJ	[] Fem	[] SC	Date Placed:   [] Arterial Line		[] R	[] L	[] Fem	[] Rad	[] Ax	Date Placed:   [] PICC:			[] Midline		[] Mediport  [] Urinary Catheter	Date Placed:   [x] Necessity of urinary, arterial, and venous catheters discussed    VASCULAR:  Exam: Extremities warm, pink, well-perfused.    MUSCULOSKELETAL:   Exam: All extremities moving spontaneously without limitations    SKIN:   Exam: Good skin turgor, no skin breakdown.     LABS:   --------------------------------------------------------------------------------------                                            9.8                   Neurophils% (auto):   x      (05-19 @ 03:10):    12.44)-----------(212          Lymphocytes% (auto):  x                                             33.6                   Eosinphils% (auto):   x        Manual%: Neutrophils x    ; Lymphocytes x    ; Eosinophils x    ; Bands%: x    ; Blasts x          05-18    144  |  103  |  10  ----------------------------<  195<H>  4.0   |  26  |  0.82    Ca    8.9      18 May 2017 04:10  Phos  3.8     05-18  Mg     1.4     05-18            RECENT CULTURES:      --------------------------------------------------------------------------------------    OTHER LABORATORY:     IMAGING STUDIES:   CXR:     ASSESSMENT:  71F s/p left partial glossectomy, Left neck dissection, tracheostomy for SCC tongue    PLAN:   Neurologic: oxycodone solution for pain  Respiratory: continue trach collar  Cardiovascular: metoprolol and losartan for hypertension  Gastrointestinal/Nutrition: NPO with tube feeds  Renal/Genitourinary: Lasix for diuresis  Hematologic: SQH for DVT prophylaxis  Infectious Disease: no infectious disease issues  Tubes/Lines/Drains: trach, PIV, CRISTIAN drain  Endocrine: Lantus and ISS for DM, synthroid for hyperthyroidism  Disposition: transfer to the floor    -------------------------------------------------------------------------------------- SICU Progress Note  =====================================================  Interval/Overnight Events: No overnight events.  Patient awaiting floor bed    HPI: 71F s/p left partial glossectomy, Left neck dissection, tracheostomy for SCC tongue    HPI:  72 y/o female with hx of tongue lesion which started approximately 1 year ago which would bleed at times, malignant on biopsy. Now scheduled for Left Glossectomy, left Neck Dissection  and Tracheostomy 5/17/17. (15 May 2017 14:45)      HISTORY  71y Female  Allergies:  latex (Other)  morphine (Other)  penicillin (Angioedema)  Ultram (Rash)    Problems:   Malignant neoplasm of base of tongue  Sleep apnea  Tongue cancer  COPD (chronic obstructive pulmonary disease)  Retinal defect, left  Aortic stenosis  Parkinson disease  Anxiety  Diabetes  Hypertension  Hypothyroid  Diabetes mellitus  Stented coronary artery  Diabetes  Sleep apnea  Tongue cancer  History of total shoulder replacement, right  History of total shoulder replacement, left  Obesity  Stented coronary artery  History of coronary artery stent placement  H/O aortic valve replacement  S/P shoulder surgery  S/P orthopedic surgery, follow-up exam  S/P cholecystectomy  S/P hysterectomy  S/P CABG x 2      MEDICATIONS:   --------------------------------------------------------------------------------------  Neurologic Medications:  ondansetron Injectable 4milliGRAM(s) IV Push once PRN  ALPRAZolam 1milliGRAM(s) Oral at bedtime PRN  carbidopa/levodopa  25/100 1Tablet(s) Oral four times a day  lamoTRIgine 100milliGRAM(s) Oral daily  oxyCODONE Solution 5milliGRAM(s) Oral every 4 hours PRN  oxyCODONE Solution 10milliGRAM(s) Oral every 4 hours PRN  acetaminophen    Suspension. 650milliGRAM(s) Oral every 6 hours PRN  sertraline 25milliGRAM(s) Oral daily    Respiratory Medications:  ALBUTerol    0.083% 2.5milliGRAM(s) Nebulizer every 6 hours    Cardiovascular Medications:  losartan 25milliGRAM(s) Oral daily  metoprolol 25milliGRAM(s) Oral two times a day  furosemide    Tablet 40milliGRAM(s) Oral daily    Gastrointestinal Medications:    Genitourinary Medications:    Hematologic/Oncologic Medications:  aspirin  chewable 81milliGRAM(s) Oral daily  heparin  Injectable 5000Unit(s) SubCutaneous every 8 hours    Antimicrobials/Immunologic Medications:    Endocrine/Metabolic Medications:  insulin glargine Injectable (LANTUS) 30Unit(s) SubCutaneous every morning  insulin lispro (HumaLOG) corrective regimen sliding scale  SubCutaneous every 6 hours  levothyroxine 25MICROGram(s) Oral daily  atorvastatin 20milliGRAM(s) Oral at bedtime    Topical/Other Medications:    --------------------------------------------------------------------------------------    VITAL SIGNS, INS/OUTS (last 24 hours):  --------------------------------------------------------------------------------------  T(C): 36.8, Max: 37.6 (05-18 @ 16:00)  HR: 88 (66 - 775)  BP: 129/48 (98/40 - 133/55)  ABP: --  ABP(mean): --  RR: 21 (19 - 26)  SpO2: 94% (93% - 100%)  Wt(kg): --  CVP(mm Hg): --      I & Os for current day (as of 05-19 @ 07:40)  =============================================  IN:    Glucerna: 618 ml    IV PiggyBack: 250 ml    Enteral Tube Flush: 240 ml    Total IN: 1108 ml  ---------------------------------------------  OUT:    Indwelling Catheter - Urethral: 600 ml    Voided: 100 ml    Bulb: 70 ml    Total OUT: 770 ml  ---------------------------------------------  Total NET: 338 ml    --------------------------------------------------------------------------------------    EXAM:     NEUROLOGY:  RASS: 0  GCS:  15  Exam: Normal, alert, NAD, no focal deficits. PERRLA.  [x] Adequacy of sedation and pain control has been assessed and adjusted    RESPIRATORY:  Exam: Lungs clear to auscultation, Normal expansion/effort.   [x] Tracheostomy on trach collar, CDI    CARDIOVASCULAR:  Exam: S1, S2.  Regular rate and rhythm. No peripheral edema  Cardiac Rhythm: Normal Sinus Rhythm    GI/NUTRITION:  Exam: Abdomen soft, Non-tender, Non-distended. Nasogastric tube in place.     Current Diet: NPO w/ TF @ 52/goal    METABOLIC/FLUIDS/ELECTROLYTES: no IVF    HEMATOLOGIC:  [x] DVT Prophylaxis: aspirin  chewable 81milliGRAM(s) Oral daily  heparin  Injectable 5000Unit(s) SubCutaneous every 8 hours    INFECTIOUS DISEASE:  Antimicrobials/Immunologic Medications: none      Tubes/Lines/Drains:  [x] Peripheral IV  [] Central Venous Line		[] R	[] L	[] IJ	[] Fem	[] SC	Date Placed:   [] Arterial Line		[] R	[] L	[] Fem	[] Rad	[] Ax	Date Placed:   [] PICC:			[] Midline		[] Mediport  [] Urinary Catheter	Date Placed:   [x] Necessity of urinary, arterial, and venous catheters discussed    VASCULAR:  Exam: Extremities warm, pink, well-perfused.    MUSCULOSKELETAL:   Exam: All extremities moving spontaneously without limitations    SKIN:   Exam: Good skin turgor, no skin breakdown.     LABS:   --------------------------------------------------------------------------------------                                            9.8                   Neurophils% (auto):   x      (05-19 @ 03:10):    12.44)-----------(212          Lymphocytes% (auto):  x                                             33.6                   Eosinphils% (auto):   x        Manual%: Neutrophils x    ; Lymphocytes x    ; Eosinophils x    ; Bands%: x    ; Blasts x          05-18    144  |  103  |  10  ----------------------------<  195<H>  4.0   |  26  |  0.82    Ca    8.9      18 May 2017 04:10  Phos  3.8     05-18  Mg     1.4     05-18    --------------------------------------------------------------------------------------    IMAGING STUDIES:   CXR:     ASSESSMENT:  71F s/p left partial glossectomy, Left neck dissection, tracheostomy for SCC tongue    PLAN:   Neurologic: oxycodone solution for pain  Respiratory: continue trach collar  Cardiovascular: metoprolol and losartan for hypertension  Gastrointestinal/Nutrition: NPO with tube feeds  Renal/Genitourinary: Lasix for diuresis  Hematologic: SQH for DVT prophylaxis  Infectious Disease: no infectious disease issues  Tubes/Lines/Drains: trach, PIV, CRISTIAN drain  Endocrine: Lantus and ISS for DM, synthroid for hyperthyroidism  Disposition: transfer to the floor    -------------------------------------------------------------------------------------- SICU Progress Note  =====================================================  Interval/Overnight Events: No overnight events.  Patient awaiting floor bed    HPI: 71F s/p left partial glossectomy, Left neck dissection, tracheostomy for SCC tongue    HPI:  72 y/o female with hx of tongue lesion which started approximately 1 year ago which would bleed at times, malignant on biopsy. Now scheduled for Left Glossectomy, left Neck Dissection  and Tracheostomy 5/17/17. (15 May 2017 14:45)      HISTORY  71y Female  Allergies:  latex (Other)  morphine (Other)  penicillin (Angioedema)  Ultram (Rash)    Problems:   Malignant neoplasm of base of tongue  Sleep apnea  Tongue cancer  COPD (chronic obstructive pulmonary disease)  Retinal defect, left  Aortic stenosis  Parkinson disease  Anxiety  Diabetes  Hypertension  Hypothyroid  Diabetes mellitus  Stented coronary artery  Diabetes  Sleep apnea  Tongue cancer  History of total shoulder replacement, right  History of total shoulder replacement, left  Obesity  Stented coronary artery  History of coronary artery stent placement  H/O aortic valve replacement  S/P shoulder surgery  S/P orthopedic surgery, follow-up exam  S/P cholecystectomy  S/P hysterectomy  S/P CABG x 2      MEDICATIONS:   --------------------------------------------------------------------------------------  Neurologic Medications:  ondansetron Injectable 4milliGRAM(s) IV Push once PRN  ALPRAZolam 1milliGRAM(s) Oral at bedtime PRN  carbidopa/levodopa  25/100 1Tablet(s) Oral four times a day  lamoTRIgine 100milliGRAM(s) Oral daily  oxyCODONE Solution 5milliGRAM(s) Oral every 4 hours PRN  oxyCODONE Solution 10milliGRAM(s) Oral every 4 hours PRN  acetaminophen    Suspension. 650milliGRAM(s) Oral every 6 hours PRN  sertraline 25milliGRAM(s) Oral daily    Respiratory Medications:  ALBUTerol    0.083% 2.5milliGRAM(s) Nebulizer every 6 hours    Cardiovascular Medications:  losartan 25milliGRAM(s) Oral daily  metoprolol 25milliGRAM(s) Oral two times a day  furosemide    Tablet 40milliGRAM(s) Oral daily    Gastrointestinal Medications:    Genitourinary Medications:    Hematologic/Oncologic Medications:  aspirin  chewable 81milliGRAM(s) Oral daily  heparin  Injectable 5000Unit(s) SubCutaneous every 8 hours    Antimicrobials/Immunologic Medications:    Endocrine/Metabolic Medications:  insulin glargine Injectable (LANTUS) 30Unit(s) SubCutaneous every morning  insulin lispro (HumaLOG) corrective regimen sliding scale  SubCutaneous every 6 hours  levothyroxine 25MICROGram(s) Oral daily  atorvastatin 20milliGRAM(s) Oral at bedtime    Topical/Other Medications:    --------------------------------------------------------------------------------------    VITAL SIGNS, INS/OUTS (last 24 hours):  --------------------------------------------------------------------------------------  T(C): 36.8, Max: 37.6 (05-18 @ 16:00)  HR: 88 (66 - 775)  BP: 129/48 (98/40 - 133/55)  ABP: --  ABP(mean): --  RR: 21 (19 - 26)  SpO2: 94% (93% - 100%)  Wt(kg): --  CVP(mm Hg): --      I & Os for current day (as of 05-19 @ 07:40)  =============================================  IN:    Glucerna: 618 ml    IV PiggyBack: 250 ml    Enteral Tube Flush: 240 ml    Total IN: 1108 ml  ---------------------------------------------  OUT:    Indwelling Catheter - Urethral: 600 ml    Voided: 100 ml    Bulb: 70 ml    Total OUT: 770 ml  ---------------------------------------------  Total NET: 338 ml    --------------------------------------------------------------------------------------    EXAM:     NEUROLOGY:  RASS: 0  GCS:  15  Exam: Normal, alert, NAD, no focal deficits. PERRLA.  [x] Adequacy of sedation and pain control has been assessed and adjusted    RESPIRATORY:  Exam: Lungs clear to auscultation, Normal expansion/effort.   [x] Tracheostomy on trach collar, CDI    CARDIOVASCULAR:  Exam: S1, S2.  Regular rate and rhythm. No peripheral edema  Cardiac Rhythm: Normal Sinus Rhythm    GI/NUTRITION:  Exam: Abdomen soft, Non-tender, Non-distended. Nasogastric tube in place.     Current Diet: NPO w/ TF @ 52/goal    METABOLIC/FLUIDS/ELECTROLYTES: no IVF    HEMATOLOGIC:  [x] DVT Prophylaxis: aspirin  chewable 81milliGRAM(s) Oral daily  heparin  Injectable 5000Unit(s) SubCutaneous every 8 hours    INFECTIOUS DISEASE:  Antimicrobials/Immunologic Medications: none      Tubes/Lines/Drains:  [x] Peripheral IV  [] Central Venous Line		[] R	[] L	[] IJ	[] Fem	[] SC	Date Placed:   [] Arterial Line		[] R	[] L	[] Fem	[] Rad	[] Ax	Date Placed:   [] PICC:			[] Midline		[] Mediport  [] Urinary Catheter	Date Placed:   [x] Necessity of urinary, arterial, and venous catheters discussed    VASCULAR:  Exam: Extremities warm, pink, well-perfused.    MUSCULOSKELETAL:   Exam: All extremities moving spontaneously without limitations    SKIN:   Exam: Good skin turgor, no skin breakdown.     LABS:   --------------------------------------------------------------------------------------                                            9.8                   Neurophils% (auto):   x      (05-19 @ 03:10):    12.44)-----------(212          Lymphocytes% (auto):  x                                             33.6                   Eosinphils% (auto):   x        Manual%: Neutrophils x    ; Lymphocytes x    ; Eosinophils x    ; Bands%: x    ; Blasts x          05-18    144  |  103  |  10  ----------------------------<  195<H>  4.0   |  26  |  0.82    Ca    8.9      18 May 2017 04:10  Phos  3.8     05-18  Mg     1.4     05-18    --------------------------------------------------------------------------------------    IMAGING STUDIES:   CXR: x    ASSESSMENT:  71F s/p left partial glossectomy, Left neck dissection, tracheostomy for SCC tongue    Critical Care Diagnoses:   Morbid Obesity  s/p tracheostomy  respirator abnormality  tongue cancer  Severe calorie protein malnutrition    PLAN:   Neurologic: oxycodone solution for pain  Respiratory: continue trach collar  Cardiovascular: metoprolol and losartan for hypertension  Gastrointestinal/Nutrition: NPO with tube feeds  Renal/Genitourinary: Lasix for diuresis  Hematologic: SQH for DVT prophylaxis  Infectious Disease: no infectious disease issues  Tubes/Lines/Drains: trach, PIV, CRISTIAN drain  Endocrine: Lantus and ISS for DM, synthroid for hyperthyroidism  Disposition: transfer to the floor    --------------------------------------------------------------------------------------

## 2017-05-19 NOTE — PROGRESS NOTE ADULT - ATTENDING COMMENTS
Patent airway, no bleeding/bronchospasm.  May start enteral tube feeds today, monitor for PONV.    The patient is a critical care patient with hemodynamic and metabolic instability in SICU.  I have personally interviewed and examined this patient, reviewed labs and x-rays, discussed with other consultants, House staff and PA's.  I spent  31   minutes  in total providing critical care for the diagnoses, assessment and plan above.  These diagnoses are unrelated to the surgical procedure noted above.  Time involved in performance of separately billable procedures was not counted toward my critical care time.  There is no overlap.
71F s/p left partial glossectomy, Left neck dissection, tracheostomy for SCC tongue    Critical Care Diagnoses:   Morbid Obesity  s/p tracheostomy  respirator abnormality  tongue cancer  Severe calorie protein malnutrition    PLAN:   Neurologic: oxycodone solution for pain  Respiratory: continue trach collar  Cardiovascular: metoprolol and losartan for hypertension  Gastrointestinal/Nutrition: NPO with tube feeds  Renal/Genitourinary: Lasix for diuresis  Hematologic: SQH for DVT prophylaxis  Infectious Disease: no infectious disease issues  Tubes/Lines/Drains: trach, PIV, CRISTIAN drain  Endocrine: Lantus and ISS for DM, synthroid for hyperthyroidism  Disposition: transfer to the floor  I have seen and examined the patient, reviewed the laboratory and radiologic data and agree with practitioner's history, physical assessment, plan and reviewed and edited where appropriate.    The patient is a critical care patient with hemodynamic and metabolic instability in SICU.  I have personally interviewed and examined this patient, reviewed labs and x-rays, discussed with other consultants, House staff and PA's.  I spent   45 min in total providing critical care for the diagnoses, assessment and plan above.  These diagnoses are unrelated to the surgical procedure noted above.    I met with family     min to get further history and make care decisions for this patient who is unable to participate due to altered mental status.    Time involved in performance of separately billable procedures was not counted toward my critical care time.  There is no overlap.

## 2017-05-20 LAB
BUN SERPL-MCNC: 11 MG/DL — SIGNIFICANT CHANGE UP (ref 7–23)
CALCIUM SERPL-MCNC: 9.8 MG/DL — SIGNIFICANT CHANGE UP (ref 8.4–10.5)
CHLORIDE SERPL-SCNC: 103 MMOL/L — SIGNIFICANT CHANGE UP (ref 98–107)
CO2 SERPL-SCNC: 25 MMOL/L — SIGNIFICANT CHANGE UP (ref 22–31)
CREAT SERPL-MCNC: 0.5 MG/DL — SIGNIFICANT CHANGE UP (ref 0.5–1.3)
GLUCOSE SERPL-MCNC: 153 MG/DL — HIGH (ref 70–99)
HCT VFR BLD CALC: 34.1 % — LOW (ref 34.5–45)
HGB BLD-MCNC: 10 G/DL — LOW (ref 11.5–15.5)
MAGNESIUM SERPL-MCNC: 1.9 MG/DL — SIGNIFICANT CHANGE UP (ref 1.6–2.6)
MCHC RBC-ENTMCNC: 22.1 PG — LOW (ref 27–34)
MCHC RBC-ENTMCNC: 29.3 % — LOW (ref 32–36)
MCV RBC AUTO: 75.3 FL — LOW (ref 80–100)
PHOSPHATE SERPL-MCNC: 2.2 MG/DL — LOW (ref 2.5–4.5)
PLATELET # BLD AUTO: 203 K/UL — SIGNIFICANT CHANGE UP (ref 150–400)
PMV BLD: 10.5 FL — SIGNIFICANT CHANGE UP (ref 7–13)
POTASSIUM SERPL-MCNC: 3.8 MMOL/L — SIGNIFICANT CHANGE UP (ref 3.5–5.3)
POTASSIUM SERPL-SCNC: 3.8 MMOL/L — SIGNIFICANT CHANGE UP (ref 3.5–5.3)
RBC # BLD: 4.53 M/UL — SIGNIFICANT CHANGE UP (ref 3.8–5.2)
RBC # FLD: 16 % — HIGH (ref 10.3–14.5)
SODIUM SERPL-SCNC: 143 MMOL/L — SIGNIFICANT CHANGE UP (ref 135–145)
WBC # BLD: 8.55 K/UL — SIGNIFICANT CHANGE UP (ref 3.8–10.5)
WBC # FLD AUTO: 8.55 K/UL — SIGNIFICANT CHANGE UP (ref 3.8–10.5)

## 2017-05-20 PROCEDURE — 71010: CPT | Mod: 26

## 2017-05-20 RX ORDER — POTASSIUM PHOSPHATE, MONOBASIC POTASSIUM PHOSPHATE, DIBASIC 236; 224 MG/ML; MG/ML
15 INJECTION, SOLUTION INTRAVENOUS ONCE
Qty: 0 | Refills: 0 | Status: COMPLETED | OUTPATIENT
Start: 2017-05-20 | End: 2017-05-20

## 2017-05-20 RX ORDER — MAGNESIUM SULFATE 500 MG/ML
1 VIAL (ML) INJECTION ONCE
Qty: 0 | Refills: 0 | Status: COMPLETED | OUTPATIENT
Start: 2017-05-20 | End: 2017-05-20

## 2017-05-20 RX ORDER — SENNA PLUS 8.6 MG/1
10 TABLET ORAL AT BEDTIME
Qty: 0 | Refills: 0 | Status: DISCONTINUED | OUTPATIENT
Start: 2017-05-20 | End: 2017-05-22

## 2017-05-20 RX ADMIN — INSULIN GLARGINE 30 UNIT(S): 100 INJECTION, SOLUTION SUBCUTANEOUS at 07:24

## 2017-05-20 RX ADMIN — SENNA PLUS 10 MILLILITER(S): 8.6 TABLET ORAL at 22:32

## 2017-05-20 RX ADMIN — POTASSIUM PHOSPHATE, MONOBASIC POTASSIUM PHOSPHATE, DIBASIC 62.5 MILLIMOLE(S): 236; 224 INJECTION, SOLUTION INTRAVENOUS at 14:49

## 2017-05-20 RX ADMIN — Medication 25 MILLIGRAM(S): at 17:32

## 2017-05-20 RX ADMIN — Medication 2: at 07:23

## 2017-05-20 RX ADMIN — CARBIDOPA AND LEVODOPA 1 TABLET(S): 25; 100 TABLET ORAL at 17:32

## 2017-05-20 RX ADMIN — Medication 100 GRAM(S): at 13:49

## 2017-05-20 RX ADMIN — SERTRALINE 25 MILLIGRAM(S): 25 TABLET, FILM COATED ORAL at 13:21

## 2017-05-20 RX ADMIN — ALBUTEROL 2.5 MILLIGRAM(S): 90 AEROSOL, METERED ORAL at 15:31

## 2017-05-20 RX ADMIN — ALBUTEROL 2.5 MILLIGRAM(S): 90 AEROSOL, METERED ORAL at 21:56

## 2017-05-20 RX ADMIN — Medication 81 MILLIGRAM(S): at 13:21

## 2017-05-20 RX ADMIN — ALBUTEROL 2.5 MILLIGRAM(S): 90 AEROSOL, METERED ORAL at 10:10

## 2017-05-20 RX ADMIN — HEPARIN SODIUM 5000 UNIT(S): 5000 INJECTION INTRAVENOUS; SUBCUTANEOUS at 05:32

## 2017-05-20 RX ADMIN — HEPARIN SODIUM 5000 UNIT(S): 5000 INJECTION INTRAVENOUS; SUBCUTANEOUS at 22:32

## 2017-05-20 RX ADMIN — ALBUTEROL 2.5 MILLIGRAM(S): 90 AEROSOL, METERED ORAL at 04:00

## 2017-05-20 RX ADMIN — HEPARIN SODIUM 5000 UNIT(S): 5000 INJECTION INTRAVENOUS; SUBCUTANEOUS at 13:21

## 2017-05-20 RX ADMIN — CARBIDOPA AND LEVODOPA 1 TABLET(S): 25; 100 TABLET ORAL at 22:37

## 2017-05-20 RX ADMIN — LAMOTRIGINE 100 MILLIGRAM(S): 25 TABLET, ORALLY DISINTEGRATING ORAL at 13:21

## 2017-05-20 RX ADMIN — CARBIDOPA AND LEVODOPA 1 TABLET(S): 25; 100 TABLET ORAL at 13:21

## 2017-05-20 RX ADMIN — ATORVASTATIN CALCIUM 20 MILLIGRAM(S): 80 TABLET, FILM COATED ORAL at 22:32

## 2017-05-21 LAB
BUN SERPL-MCNC: 9 MG/DL — SIGNIFICANT CHANGE UP (ref 7–23)
CALCIUM SERPL-MCNC: 9.7 MG/DL — SIGNIFICANT CHANGE UP (ref 8.4–10.5)
CHLORIDE SERPL-SCNC: 101 MMOL/L — SIGNIFICANT CHANGE UP (ref 98–107)
CO2 SERPL-SCNC: 26 MMOL/L — SIGNIFICANT CHANGE UP (ref 22–31)
CREAT SERPL-MCNC: 0.49 MG/DL — LOW (ref 0.5–1.3)
GLUCOSE SERPL-MCNC: 156 MG/DL — HIGH (ref 70–99)
HCT VFR BLD CALC: 35 % — SIGNIFICANT CHANGE UP (ref 34.5–45)
HGB BLD-MCNC: 10 G/DL — LOW (ref 11.5–15.5)
MAGNESIUM SERPL-MCNC: 1.7 MG/DL — SIGNIFICANT CHANGE UP (ref 1.6–2.6)
MCHC RBC-ENTMCNC: 21.6 PG — LOW (ref 27–34)
MCHC RBC-ENTMCNC: 28.6 % — LOW (ref 32–36)
MCV RBC AUTO: 75.4 FL — LOW (ref 80–100)
PHOSPHATE SERPL-MCNC: 2.6 MG/DL — SIGNIFICANT CHANGE UP (ref 2.5–4.5)
PLATELET # BLD AUTO: 215 K/UL — SIGNIFICANT CHANGE UP (ref 150–400)
PMV BLD: 10.6 FL — SIGNIFICANT CHANGE UP (ref 7–13)
POTASSIUM SERPL-MCNC: 3.8 MMOL/L — SIGNIFICANT CHANGE UP (ref 3.5–5.3)
POTASSIUM SERPL-SCNC: 3.8 MMOL/L — SIGNIFICANT CHANGE UP (ref 3.5–5.3)
RBC # BLD: 4.64 M/UL — SIGNIFICANT CHANGE UP (ref 3.8–5.2)
RBC # FLD: 16.1 % — HIGH (ref 10.3–14.5)
SODIUM SERPL-SCNC: 142 MMOL/L — SIGNIFICANT CHANGE UP (ref 135–145)
WBC # BLD: 7.46 K/UL — SIGNIFICANT CHANGE UP (ref 3.8–10.5)
WBC # FLD AUTO: 7.46 K/UL — SIGNIFICANT CHANGE UP (ref 3.8–10.5)

## 2017-05-21 RX ORDER — POTASSIUM CHLORIDE 20 MEQ
20 PACKET (EA) ORAL ONCE
Qty: 0 | Refills: 0 | Status: COMPLETED | OUTPATIENT
Start: 2017-05-21 | End: 2017-05-21

## 2017-05-21 RX ORDER — MAGNESIUM SULFATE 500 MG/ML
2 VIAL (ML) INJECTION ONCE
Qty: 0 | Refills: 0 | Status: COMPLETED | OUTPATIENT
Start: 2017-05-21 | End: 2017-05-21

## 2017-05-21 RX ORDER — SODIUM,POTASSIUM PHOSPHATES 278-250MG
2 POWDER IN PACKET (EA) ORAL
Qty: 0 | Refills: 0 | Status: COMPLETED | OUTPATIENT
Start: 2017-05-21 | End: 2017-05-22

## 2017-05-21 RX ORDER — INSULIN GLARGINE 100 [IU]/ML
15 INJECTION, SOLUTION SUBCUTANEOUS ONCE
Qty: 0 | Refills: 0 | Status: COMPLETED | OUTPATIENT
Start: 2017-05-21 | End: 2017-05-21

## 2017-05-21 RX ADMIN — Medication 25 MILLIGRAM(S): at 05:51

## 2017-05-21 RX ADMIN — HEPARIN SODIUM 5000 UNIT(S): 5000 INJECTION INTRAVENOUS; SUBCUTANEOUS at 14:39

## 2017-05-21 RX ADMIN — ALBUTEROL 2.5 MILLIGRAM(S): 90 AEROSOL, METERED ORAL at 21:57

## 2017-05-21 RX ADMIN — ALBUTEROL 2.5 MILLIGRAM(S): 90 AEROSOL, METERED ORAL at 11:13

## 2017-05-21 RX ADMIN — Medication 2: at 06:54

## 2017-05-21 RX ADMIN — Medication 50 GRAM(S): at 16:25

## 2017-05-21 RX ADMIN — ALBUTEROL 2.5 MILLIGRAM(S): 90 AEROSOL, METERED ORAL at 03:47

## 2017-05-21 RX ADMIN — CARBIDOPA AND LEVODOPA 1 TABLET(S): 25; 100 TABLET ORAL at 12:07

## 2017-05-21 RX ADMIN — LAMOTRIGINE 100 MILLIGRAM(S): 25 TABLET, ORALLY DISINTEGRATING ORAL at 12:07

## 2017-05-21 RX ADMIN — Medication 81 MILLIGRAM(S): at 12:07

## 2017-05-21 RX ADMIN — ALBUTEROL 2.5 MILLIGRAM(S): 90 AEROSOL, METERED ORAL at 17:11

## 2017-05-21 RX ADMIN — Medication 40 MILLIGRAM(S): at 05:51

## 2017-05-21 RX ADMIN — Medication 2 TABLET(S): at 18:45

## 2017-05-21 RX ADMIN — HEPARIN SODIUM 5000 UNIT(S): 5000 INJECTION INTRAVENOUS; SUBCUTANEOUS at 05:51

## 2017-05-21 RX ADMIN — Medication 25 MILLIGRAM(S): at 18:44

## 2017-05-21 RX ADMIN — SERTRALINE 25 MILLIGRAM(S): 25 TABLET, FILM COATED ORAL at 12:07

## 2017-05-21 RX ADMIN — CARBIDOPA AND LEVODOPA 1 TABLET(S): 25; 100 TABLET ORAL at 05:51

## 2017-05-21 RX ADMIN — Medication 25 MICROGRAM(S): at 05:51

## 2017-05-21 RX ADMIN — Medication 20 MILLIEQUIVALENT(S): at 18:44

## 2017-05-21 RX ADMIN — CARBIDOPA AND LEVODOPA 1 TABLET(S): 25; 100 TABLET ORAL at 18:44

## 2017-05-21 RX ADMIN — LOSARTAN POTASSIUM 25 MILLIGRAM(S): 100 TABLET, FILM COATED ORAL at 05:51

## 2017-05-21 RX ADMIN — INSULIN GLARGINE 15 UNIT(S): 100 INJECTION, SOLUTION SUBCUTANEOUS at 07:44

## 2017-05-21 NOTE — OCCUPATIONAL THERAPY INITIAL EVALUATION ADULT - MD ORDER
Occupational Therapy to evaluate and treat. Occupational Therapy to evaluate and treat. Per JAKUB Blake, pt is okay to participate in OT evaluation and perform OOB activity.

## 2017-05-21 NOTE — OCCUPATIONAL THERAPY INITIAL EVALUATION ADULT - LIVES WITH, PROFILE
Pt. reports she lives with her  in an apartment with no steps to enter. Once inside, pt. reports she has full flight of steps to negotiate to reach the basement where her apartment is located. Per pt., she has a shower stall in her bathroom + shower chair and grab bars available.

## 2017-05-21 NOTE — PROGRESS NOTE ADULT - SUBJECTIVE AND OBJECTIVE BOX
Pt seen and examined. NGT replaced yesterday. Changed to bolus TF. Tolerating feeds. Ambulating w/ assistance.     AVSS  NAD  NGT in place w/ tape  OC/OP: mild tongue edema, tongue soft and mobile, sutures intact, no bleeding  Neck: incision c/d/i, CRISTIAN serosang output (33.5cc/24hr), soft, flat, no crepitus  6LPC in place    A/P: 71F POD4 s/p L partial gloss, L SND 1-4, trach   -NPO x 5 days  -cont bolus TF  -CRISTIAN to bulb suction, monitor output  -dvt ppx: sqh  -cont home meds  -FSG and sliding scale  -pain control  -trach care  -swab sticks to tongue  -f/u am labs  -bowel regimen  -PT/OT

## 2017-05-21 NOTE — OCCUPATIONAL THERAPY INITIAL EVALUATION ADULT - RANGE OF MOTION EXAMINATION, UPPER EXTREMITY
bilateral UE Active ROM was WFL  (within functional limits)/except B/L Shoulder Flexion/Extension AROM 0-80 degrees

## 2017-05-21 NOTE — OCCUPATIONAL THERAPY INITIAL EVALUATION ADULT - PERTINENT HX OF CURRENT PROBLEM, REHAB EVAL
Pt is a 71 year old female with hx of tongue lesion which started approximately 1 year ago which would bleed at times, malignant on biopsy. Pt is now s/p Left Glossectomy, left Neck Dissection  and Tracheostomy 5/17/17.

## 2017-05-21 NOTE — OCCUPATIONAL THERAPY INITIAL EVALUATION ADULT - ANTICIPATED DISCHARGE DISPOSITION, OT EVAL
Anticipate Home with no skilled OT services. Pt. may benefit from PT services. However, please continue to follow progress notes closely.

## 2017-05-21 NOTE — OCCUPATIONAL THERAPY INITIAL EVALUATION ADULT - DIAGNOSIS, OT EVAL
s/p Left Glossectomy, left Neck Dissection  and Tracheostomy; Decreased standing balance; Decreased functional mobility; Decreased ADL management

## 2017-05-21 NOTE — PROVIDER CONTACT NOTE (MEDICATION) - ASSESSMENT
Patient is ordered for 30 units of lantus in AM . MD ordered to give 15 units of Lantus this morning. Clarified with MD, patient is only taking 15 units of lantus this morning. No further orders.

## 2017-05-21 NOTE — OCCUPATIONAL THERAPY INITIAL EVALUATION ADULT - GENERAL OBSERVATIONS, REHAB EVAL
Pt. received semisupine in bed. NAD. +Trach, +Heplock, +CRISTIAN Drains, +NG tube. PT present bedside to perform PT evaluation in conjunction w/ OT evaluation.

## 2017-05-22 ENCOUNTER — TRANSCRIPTION ENCOUNTER (OUTPATIENT)
Age: 72
End: 2017-05-22

## 2017-05-22 LAB
BUN SERPL-MCNC: 9 MG/DL — SIGNIFICANT CHANGE UP (ref 7–23)
CALCIUM SERPL-MCNC: 9.6 MG/DL — SIGNIFICANT CHANGE UP (ref 8.4–10.5)
CHLORIDE SERPL-SCNC: 97 MMOL/L — LOW (ref 98–107)
CO2 SERPL-SCNC: 27 MMOL/L — SIGNIFICANT CHANGE UP (ref 22–31)
CREAT SERPL-MCNC: 0.47 MG/DL — LOW (ref 0.5–1.3)
GLUCOSE SERPL-MCNC: 145 MG/DL — HIGH (ref 70–99)
HCT VFR BLD CALC: 35.8 % — SIGNIFICANT CHANGE UP (ref 34.5–45)
HGB BLD-MCNC: 10.4 G/DL — LOW (ref 11.5–15.5)
MAGNESIUM SERPL-MCNC: 1.8 MG/DL — SIGNIFICANT CHANGE UP (ref 1.6–2.6)
MCHC RBC-ENTMCNC: 21.8 PG — LOW (ref 27–34)
MCHC RBC-ENTMCNC: 29.1 % — LOW (ref 32–36)
MCV RBC AUTO: 74.9 FL — LOW (ref 80–100)
PHOSPHATE SERPL-MCNC: 2.3 MG/DL — LOW (ref 2.5–4.5)
PLATELET # BLD AUTO: 247 K/UL — SIGNIFICANT CHANGE UP (ref 150–400)
PMV BLD: 11.3 FL — SIGNIFICANT CHANGE UP (ref 7–13)
POTASSIUM SERPL-MCNC: 3.8 MMOL/L — SIGNIFICANT CHANGE UP (ref 3.5–5.3)
POTASSIUM SERPL-SCNC: 3.8 MMOL/L — SIGNIFICANT CHANGE UP (ref 3.5–5.3)
RBC # BLD: 4.78 M/UL — SIGNIFICANT CHANGE UP (ref 3.8–5.2)
RBC # FLD: 16.2 % — HIGH (ref 10.3–14.5)
SODIUM SERPL-SCNC: 141 MMOL/L — SIGNIFICANT CHANGE UP (ref 135–145)
WBC # BLD: 7.59 K/UL — SIGNIFICANT CHANGE UP (ref 3.8–10.5)
WBC # FLD AUTO: 7.59 K/UL — SIGNIFICANT CHANGE UP (ref 3.8–10.5)

## 2017-05-22 PROCEDURE — 31502 CHANGE OF WINDPIPE AIRWAY: CPT

## 2017-05-22 RX ORDER — INSULIN LISPRO 100/ML
VIAL (ML) SUBCUTANEOUS
Qty: 0 | Refills: 0 | Status: DISCONTINUED | OUTPATIENT
Start: 2017-05-22 | End: 2017-06-01

## 2017-05-22 RX ORDER — OXYCODONE HYDROCHLORIDE 5 MG/1
5 TABLET ORAL EVERY 4 HOURS
Qty: 0 | Refills: 0 | Status: DISCONTINUED | OUTPATIENT
Start: 2017-05-22 | End: 2017-05-27

## 2017-05-22 RX ORDER — ATORVASTATIN CALCIUM 80 MG/1
20 TABLET, FILM COATED ORAL AT BEDTIME
Qty: 0 | Refills: 0 | Status: DISCONTINUED | OUTPATIENT
Start: 2017-05-22 | End: 2017-06-01

## 2017-05-22 RX ORDER — MAGNESIUM SULFATE 500 MG/ML
2 VIAL (ML) INJECTION ONCE
Qty: 0 | Refills: 0 | Status: COMPLETED | OUTPATIENT
Start: 2017-05-22 | End: 2017-05-22

## 2017-05-22 RX ORDER — LOSARTAN POTASSIUM 100 MG/1
25 TABLET, FILM COATED ORAL DAILY
Qty: 0 | Refills: 0 | Status: DISCONTINUED | OUTPATIENT
Start: 2017-05-22 | End: 2017-06-01

## 2017-05-22 RX ORDER — METOPROLOL TARTRATE 50 MG
5 TABLET ORAL ONCE
Qty: 0 | Refills: 0 | Status: COMPLETED | OUTPATIENT
Start: 2017-05-22 | End: 2017-05-22

## 2017-05-22 RX ORDER — LOSARTAN POTASSIUM 100 MG/1
25 TABLET, FILM COATED ORAL DAILY
Qty: 0 | Refills: 0 | Status: DISCONTINUED | OUTPATIENT
Start: 2017-05-22 | End: 2017-05-22

## 2017-05-22 RX ORDER — METOPROLOL TARTRATE 50 MG
25 TABLET ORAL ONCE
Qty: 0 | Refills: 0 | Status: COMPLETED | OUTPATIENT
Start: 2017-05-22 | End: 2017-05-22

## 2017-05-22 RX ORDER — SENNA PLUS 8.6 MG/1
2 TABLET ORAL AT BEDTIME
Qty: 0 | Refills: 0 | Status: DISCONTINUED | OUTPATIENT
Start: 2017-05-22 | End: 2017-06-01

## 2017-05-22 RX ORDER — LAMOTRIGINE 25 MG/1
25 TABLET, ORALLY DISINTEGRATING ORAL DAILY
Qty: 0 | Refills: 0 | Status: DISCONTINUED | OUTPATIENT
Start: 2017-05-22 | End: 2017-06-01

## 2017-05-22 RX ORDER — FUROSEMIDE 40 MG
40 TABLET ORAL DAILY
Qty: 0 | Refills: 0 | Status: DISCONTINUED | OUTPATIENT
Start: 2017-05-22 | End: 2017-06-01

## 2017-05-22 RX ORDER — LEVOTHYROXINE SODIUM 125 MCG
25 TABLET ORAL DAILY
Qty: 0 | Refills: 0 | Status: DISCONTINUED | OUTPATIENT
Start: 2017-05-22 | End: 2017-06-01

## 2017-05-22 RX ORDER — ALPRAZOLAM 0.25 MG
1 TABLET ORAL EVERY 8 HOURS
Qty: 0 | Refills: 0 | Status: DISCONTINUED | OUTPATIENT
Start: 2017-05-22 | End: 2017-05-27

## 2017-05-22 RX ORDER — CARBIDOPA AND LEVODOPA 25; 100 MG/1; MG/1
1 TABLET ORAL DAILY
Qty: 0 | Refills: 0 | Status: DISCONTINUED | OUTPATIENT
Start: 2017-05-22 | End: 2017-05-22

## 2017-05-22 RX ORDER — OXYCODONE HYDROCHLORIDE 5 MG/1
15 TABLET ORAL EVERY 4 HOURS
Qty: 0 | Refills: 0 | Status: DISCONTINUED | OUTPATIENT
Start: 2017-05-22 | End: 2017-05-27

## 2017-05-22 RX ORDER — CARBIDOPA AND LEVODOPA 25; 100 MG/1; MG/1
1 TABLET ORAL
Qty: 0 | Refills: 0 | Status: DISCONTINUED | OUTPATIENT
Start: 2017-05-22 | End: 2017-06-01

## 2017-05-22 RX ORDER — FUROSEMIDE 40 MG
20 TABLET ORAL DAILY
Qty: 0 | Refills: 0 | Status: DISCONTINUED | OUTPATIENT
Start: 2017-05-22 | End: 2017-05-22

## 2017-05-22 RX ADMIN — ALBUTEROL 2.5 MILLIGRAM(S): 90 AEROSOL, METERED ORAL at 16:04

## 2017-05-22 RX ADMIN — Medication 5 MILLIGRAM(S): at 10:43

## 2017-05-22 RX ADMIN — ALBUTEROL 2.5 MILLIGRAM(S): 90 AEROSOL, METERED ORAL at 10:18

## 2017-05-22 RX ADMIN — Medication 50 GRAM(S): at 16:28

## 2017-05-22 RX ADMIN — CARBIDOPA AND LEVODOPA 1 TABLET(S): 25; 100 TABLET ORAL at 23:57

## 2017-05-22 RX ADMIN — LOSARTAN POTASSIUM 25 MILLIGRAM(S): 100 TABLET, FILM COATED ORAL at 14:44

## 2017-05-22 RX ADMIN — HEPARIN SODIUM 5000 UNIT(S): 5000 INJECTION INTRAVENOUS; SUBCUTANEOUS at 14:44

## 2017-05-22 RX ADMIN — ALBUTEROL 2.5 MILLIGRAM(S): 90 AEROSOL, METERED ORAL at 22:38

## 2017-05-22 RX ADMIN — Medication 2: at 01:02

## 2017-05-22 RX ADMIN — Medication 25 MILLIGRAM(S): at 14:42

## 2017-05-22 RX ADMIN — CARBIDOPA AND LEVODOPA 1 TABLET(S): 25; 100 TABLET ORAL at 17:55

## 2017-05-22 RX ADMIN — ALBUTEROL 2.5 MILLIGRAM(S): 90 AEROSOL, METERED ORAL at 04:05

## 2017-05-22 RX ADMIN — Medication 4: at 17:56

## 2017-05-22 RX ADMIN — HEPARIN SODIUM 5000 UNIT(S): 5000 INJECTION INTRAVENOUS; SUBCUTANEOUS at 22:07

## 2017-05-22 RX ADMIN — LAMOTRIGINE 25 MILLIGRAM(S): 25 TABLET, ORALLY DISINTEGRATING ORAL at 14:43

## 2017-05-22 RX ADMIN — Medication 40 MILLIGRAM(S): at 14:44

## 2017-05-22 RX ADMIN — ATORVASTATIN CALCIUM 20 MILLIGRAM(S): 80 TABLET, FILM COATED ORAL at 22:07

## 2017-05-22 NOTE — PHYSICAL THERAPY INITIAL EVALUATION ADULT - RANGE OF MOTION EXAMINATION, REHAB EVAL
bilateral lower extremity ROM was WFL (within functional limits)/bilateral upper extremity ROM was WFL (within functional limits)/deficits as listed below/ex. b/l sh/: 0-100

## 2017-05-22 NOTE — PROGRESS NOTE ADULT - SUBJECTIVE AND OBJECTIVE BOX
Pt seen and examined. NGT removed by pt overnight.      AVSS  NAD  OC/OP: mild tongue edema, tongue soft and mobile, sutures intact, no bleeding  Neck: incision c/d/i, CRISTIAN serosang output, soft, flat, no crepitus  6LPC in place    A/P: 71F POD5 s/p L partial gloss, L SND 1-4, trach   -NPO x 5 days  -PO trial today after swallow  -CRISTIAN to bulb suction, monitor output  -dvt ppx: sqh  -cont home meds  -FSG and sliding scale  -pain control  -trach care  -swab sticks to tongue  -f/u am labs  -bowel regimen  -PT/OT

## 2017-05-22 NOTE — DISCHARGE NOTE ADULT - HOSPITAL COURSE
s/p partial glossectomy, tracheostomy tube and lefty neck dissection on... s/p partial glossectomy, tracheostomy tube and lefty neck dissection on 5/17  admitted for inpatient care  started on D1 diet, seen by PT who recc home with walker. Path showed reactive LN  now decannulated, tolerating PO, pain well controlled  clear for DC home

## 2017-05-22 NOTE — SWALLOW BEDSIDE ASSESSMENT ADULT - ASR SWALLOW LINGUAL MOBILITY
impaired protrusion/impaired anterior elevation/impaired left lateral movement/impaired right lateral movement

## 2017-05-22 NOTE — DISCHARGE NOTE ADULT - MEDICATION SUMMARY - MEDICATIONS TO TAKE
I will START or STAY ON the medications listed below when I get home from the hospital:    acetaminophen-oxycodone 325 mg-5 mg oral tablet  -- 1 tab(s) by mouth every 6 hours prn severe pain MDD:4 tab  -- Caution federal law prohibits the transfer of this drug to any person other  than the person for whom it was prescribed.  May cause drowsiness.  Alcohol may intensify this effect.  Use care when operating dangerous machinery.  This prescription cannot be refilled.  This product contains acetaminophen.  Do not use  with any other product containing acetaminophen to prevent possible liver damage.  Using more of this medication than prescribed may cause serious breathing problems.    -- Indication: For pain    Aspir-Low 81 mg oral delayed release tablet  -- 1 tab(s) by mouth once a day  -- Indication: For Home    losartan 25 mg oral tablet  -- 1 tab(s) by mouth once a day  -- Indication: For Home    lamoTRIgine 100 mg oral tablet  -- 1 tab(s) by mouth once a day  -- Indication: For Home    sertraline 100 mg oral tablet  -- 1 tab(s) by mouth once a day  -- Indication: For Home    Levemir 100 units/mL subcutaneous solution  -- 60 unit(s) subcutaneous once a day (at bedtime)  -- Indication: For Home    metFORMIN 500 mg oral tablet  -- 1 tab(s) by mouth every 8 hours  -- Indication: For Home    atorvastatin 20 mg oral tablet  -- 1 tab(s) by mouth once a day (at bedtime)  -- Indication: For [place]    carbidopa-levodopa 25 mg-100 mg oral tablet  -- 1 tab(s) by mouth 4 times a day  -- Indication: For Home    Neupro 4 mg/24 hr transdermal film, extended release  -- 1 patch by transdermal patch once a day  -- Indication: For Home    carbidopa-levodopa 50 mg-200 mg oral tablet, extended release  -- 1 tab(s) by mouth once a day (at bedtime)  -- Indication: For Home    Plavix 75 mg oral tablet  -- 1 tab(s) by mouth once a day  -- Indication: For Home    ALPRAZolam 1 mg oral tablet  -- 1 tab(s) by mouth once a day (at bedtime), As Needed -Anti-anxiety   -- Indication: For Home    metoprolol succinate 50 mg oral tablet, extended release  -- 1 tab(s) by mouth once a day  -- Indication: For Home    albuterol 90 mcg/inh inhalation aerosol  --  inhaled , As Needed  -- Indication: For Home    albuterol 2.5 mg/3 mL (0.083%) inhalation solution  -- 2.5 milligram(s) inhaled every 6 hours, As Needed  -- Indication: For Home    Lasix 40 mg oral tablet  -- 1 tab(s) by mouth once a day  -- Indication: For Home    senna oral tablet  -- 2 tab(s) by mouth once a day (at bedtime)  -- Indication: For Home    potassium chloride 10 mEq oral capsule, extended release  -- 1 cap(s) by mouth 2 times a day  -- Indication: For Home    levothyroxine 25 mcg (0.025 mg) oral capsule  -- 1 cap(s) by mouth once a day  -- Indication: For Home    ergocalciferol 50,000 intl units (1.25 mg) oral capsule  -- 1 cap(s) by mouth 3 times a week (MW)  -- Indication: For Home

## 2017-05-22 NOTE — DISCHARGE NOTE ADULT - CARE PROVIDER_API CALL
Jose Dias), Otolaryngology  [address] [address] [place] [address]  Phone: [phone]  Fax: [phone]

## 2017-05-22 NOTE — DISCHARGE NOTE ADULT - CARE PLAN
Principal Discharge DX:	Tongue cancer  Goal:	partial glossectomy  Instructions for follow-up, activity and diet:	diet:

## 2017-05-22 NOTE — DISCHARGE NOTE ADULT - PATIENT PORTAL LINK FT
“You can access the Follow[facility] Patient Portal, offered by [facility], by registering with the following website: [email or web address]”

## 2017-05-23 LAB
BUN SERPL-MCNC: 10 MG/DL — SIGNIFICANT CHANGE UP (ref 7–23)
CALCIUM SERPL-MCNC: 9.7 MG/DL — SIGNIFICANT CHANGE UP (ref 8.4–10.5)
CHLORIDE SERPL-SCNC: 99 MMOL/L — SIGNIFICANT CHANGE UP (ref 98–107)
CO2 SERPL-SCNC: 28 MMOL/L — SIGNIFICANT CHANGE UP (ref 22–31)
CREAT SERPL-MCNC: 0.52 MG/DL — SIGNIFICANT CHANGE UP (ref 0.5–1.3)
GLUCOSE SERPL-MCNC: 134 MG/DL — HIGH (ref 70–99)
INR BLD: 1.17 — SIGNIFICANT CHANGE UP (ref 0.88–1.17)
MAGNESIUM SERPL-MCNC: 2 MG/DL — SIGNIFICANT CHANGE UP (ref 1.6–2.6)
PHOSPHATE SERPL-MCNC: 3.2 MG/DL — SIGNIFICANT CHANGE UP (ref 2.5–4.5)
POTASSIUM SERPL-MCNC: 3.7 MMOL/L — SIGNIFICANT CHANGE UP (ref 3.5–5.3)
POTASSIUM SERPL-SCNC: 3.7 MMOL/L — SIGNIFICANT CHANGE UP (ref 3.5–5.3)
PROTHROM AB SERPL-ACNC: 13.1 SEC — SIGNIFICANT CHANGE UP (ref 9.8–13.1)
SODIUM SERPL-SCNC: 142 MMOL/L — SIGNIFICANT CHANGE UP (ref 135–145)

## 2017-05-23 RX ORDER — SCOPALAMINE 1 MG/3D
1.5 PATCH, EXTENDED RELEASE TRANSDERMAL ONCE
Qty: 0 | Refills: 0 | Status: COMPLETED | OUTPATIENT
Start: 2017-05-23 | End: 2017-05-24

## 2017-05-23 RX ADMIN — CARBIDOPA AND LEVODOPA 1 TABLET(S): 25; 100 TABLET ORAL at 06:31

## 2017-05-23 RX ADMIN — CARBIDOPA AND LEVODOPA 1 TABLET(S): 25; 100 TABLET ORAL at 23:28

## 2017-05-23 RX ADMIN — ALBUTEROL 2.5 MILLIGRAM(S): 90 AEROSOL, METERED ORAL at 10:00

## 2017-05-23 RX ADMIN — INSULIN GLARGINE 30 UNIT(S): 100 INJECTION, SOLUTION SUBCUTANEOUS at 09:35

## 2017-05-23 RX ADMIN — CARBIDOPA AND LEVODOPA 1 TABLET(S): 25; 100 TABLET ORAL at 19:05

## 2017-05-23 RX ADMIN — Medication 81 MILLIGRAM(S): at 12:41

## 2017-05-23 RX ADMIN — HEPARIN SODIUM 5000 UNIT(S): 5000 INJECTION INTRAVENOUS; SUBCUTANEOUS at 14:05

## 2017-05-23 RX ADMIN — HEPARIN SODIUM 5000 UNIT(S): 5000 INJECTION INTRAVENOUS; SUBCUTANEOUS at 23:23

## 2017-05-23 RX ADMIN — ALBUTEROL 2.5 MILLIGRAM(S): 90 AEROSOL, METERED ORAL at 22:10

## 2017-05-23 RX ADMIN — ALBUTEROL 2.5 MILLIGRAM(S): 90 AEROSOL, METERED ORAL at 16:54

## 2017-05-23 RX ADMIN — CARBIDOPA AND LEVODOPA 1 TABLET(S): 25; 100 TABLET ORAL at 12:41

## 2017-05-23 RX ADMIN — LAMOTRIGINE 25 MILLIGRAM(S): 25 TABLET, ORALLY DISINTEGRATING ORAL at 12:41

## 2017-05-23 RX ADMIN — Medication 40 MILLIGRAM(S): at 06:31

## 2017-05-23 RX ADMIN — HEPARIN SODIUM 5000 UNIT(S): 5000 INJECTION INTRAVENOUS; SUBCUTANEOUS at 06:32

## 2017-05-23 RX ADMIN — LOSARTAN POTASSIUM 25 MILLIGRAM(S): 100 TABLET, FILM COATED ORAL at 06:31

## 2017-05-23 RX ADMIN — ATORVASTATIN CALCIUM 20 MILLIGRAM(S): 80 TABLET, FILM COATED ORAL at 23:23

## 2017-05-23 RX ADMIN — ALBUTEROL 2.5 MILLIGRAM(S): 90 AEROSOL, METERED ORAL at 04:02

## 2017-05-23 RX ADMIN — Medication 25 MICROGRAM(S): at 06:31

## 2017-05-23 NOTE — PROGRESS NOTE ADULT - ASSESSMENT
s/p left partial glossectomy, left SND 1-4, trach on 5/17  - cap trach  - encourage po  - home meds  - am labs  - dvt ppx  - dispo planning  - pt follow up

## 2017-05-23 NOTE — PROGRESS NOTE ADULT - SUBJECTIVE AND OBJECTIVE BOX
patient seen and examined at bedside  no acute events  changed trach   tolerating po     afvss  nad  awake, alert  breathing comfortably on trach collar  oc: clear, no bleeding, well healing hemiglossectomy  neck: soft and flat, incision c/d/i, 4CFS

## 2017-05-24 ENCOUNTER — RESULT REVIEW (OUTPATIENT)
Age: 72
End: 2017-05-24

## 2017-05-24 LAB
APTT BLD: 29.4 SEC — SIGNIFICANT CHANGE UP (ref 27.5–37.4)
HCT VFR BLD CALC: 36.4 % — SIGNIFICANT CHANGE UP (ref 34.5–45)
HGB BLD-MCNC: 10.5 G/DL — LOW (ref 11.5–15.5)
INR BLD: 1.14 — SIGNIFICANT CHANGE UP (ref 0.88–1.17)
MCHC RBC-ENTMCNC: 21.5 PG — LOW (ref 27–34)
MCHC RBC-ENTMCNC: 28.8 % — LOW (ref 32–36)
MCV RBC AUTO: 74.6 FL — LOW (ref 80–100)
PLATELET # BLD AUTO: 260 K/UL — SIGNIFICANT CHANGE UP (ref 150–400)
PMV BLD: 11.1 FL — SIGNIFICANT CHANGE UP (ref 7–13)
PROTHROM AB SERPL-ACNC: 12.8 SEC — SIGNIFICANT CHANGE UP (ref 9.8–13.1)
RBC # BLD: 4.88 M/UL — SIGNIFICANT CHANGE UP (ref 3.8–5.2)
RBC # FLD: 16.2 % — HIGH (ref 10.3–14.5)
WBC # BLD: 8.53 K/UL — SIGNIFICANT CHANGE UP (ref 3.8–10.5)
WBC # FLD AUTO: 8.53 K/UL — SIGNIFICANT CHANGE UP (ref 3.8–10.5)

## 2017-05-24 PROCEDURE — 88305 TISSUE EXAM BY PATHOLOGIST: CPT | Mod: 26

## 2017-05-24 PROCEDURE — 88189 FLOWCYTOMETRY/READ 16 & >: CPT

## 2017-05-24 PROCEDURE — 76942 ECHO GUIDE FOR BIOPSY: CPT | Mod: 26

## 2017-05-24 PROCEDURE — 71010: CPT | Mod: 26

## 2017-05-24 PROCEDURE — 88173 CYTOPATH EVAL FNA REPORT: CPT | Mod: 26

## 2017-05-24 PROCEDURE — 10022: CPT

## 2017-05-24 RX ORDER — ASPIRIN/CALCIUM CARB/MAGNESIUM 324 MG
81 TABLET ORAL DAILY
Qty: 0 | Refills: 0 | Status: DISCONTINUED | OUTPATIENT
Start: 2017-05-24 | End: 2017-06-01

## 2017-05-24 RX ADMIN — HEPARIN SODIUM 5000 UNIT(S): 5000 INJECTION INTRAVENOUS; SUBCUTANEOUS at 05:55

## 2017-05-24 RX ADMIN — ALBUTEROL 2.5 MILLIGRAM(S): 90 AEROSOL, METERED ORAL at 21:50

## 2017-05-24 RX ADMIN — ALBUTEROL 2.5 MILLIGRAM(S): 90 AEROSOL, METERED ORAL at 04:05

## 2017-05-24 RX ADMIN — ALBUTEROL 2.5 MILLIGRAM(S): 90 AEROSOL, METERED ORAL at 10:13

## 2017-05-24 RX ADMIN — Medication 2: at 22:19

## 2017-05-24 RX ADMIN — ALBUTEROL 2.5 MILLIGRAM(S): 90 AEROSOL, METERED ORAL at 16:53

## 2017-05-24 RX ADMIN — Medication 4: at 18:59

## 2017-05-24 RX ADMIN — Medication 25 MICROGRAM(S): at 05:55

## 2017-05-24 RX ADMIN — INSULIN GLARGINE 30 UNIT(S): 100 INJECTION, SOLUTION SUBCUTANEOUS at 07:41

## 2017-05-24 RX ADMIN — Medication 81 MILLIGRAM(S): at 18:59

## 2017-05-24 RX ADMIN — ATORVASTATIN CALCIUM 20 MILLIGRAM(S): 80 TABLET, FILM COATED ORAL at 22:19

## 2017-05-24 RX ADMIN — CARBIDOPA AND LEVODOPA 1 TABLET(S): 25; 100 TABLET ORAL at 05:55

## 2017-05-24 RX ADMIN — LOSARTAN POTASSIUM 25 MILLIGRAM(S): 100 TABLET, FILM COATED ORAL at 05:55

## 2017-05-24 RX ADMIN — CARBIDOPA AND LEVODOPA 1 TABLET(S): 25; 100 TABLET ORAL at 18:59

## 2017-05-24 RX ADMIN — SCOPALAMINE 1.5 MILLIGRAM(S): 1 PATCH, EXTENDED RELEASE TRANSDERMAL at 14:36

## 2017-05-24 RX ADMIN — HEPARIN SODIUM 5000 UNIT(S): 5000 INJECTION INTRAVENOUS; SUBCUTANEOUS at 22:19

## 2017-05-24 RX ADMIN — LAMOTRIGINE 25 MILLIGRAM(S): 25 TABLET, ORALLY DISINTEGRATING ORAL at 18:59

## 2017-05-24 RX ADMIN — Medication 40 MILLIGRAM(S): at 05:55

## 2017-05-24 NOTE — SWALLOW BEDSIDE ASSESSMENT ADULT - SWALLOW EVAL: RECOMMENDED FEEDING/EATING TECHNIQUES
allow for swallow between intakes/oral hygiene/alternate food with liquid/turn head right/check mouth frequently for oral residue/pocketing/small sips/bites/position upright (90 degrees)/no straws/crush medication (when feasible)/maintain upright posture during/after eating for 30 mins
small sips/bites/allow for swallow between intakes/maintain upright posture during/after eating for 30 mins/position upright (90 degrees)/crush medication (when feasible)/no straws/oral hygiene

## 2017-05-24 NOTE — SWALLOW BEDSIDE ASSESSMENT ADULT - SLP PERTINENT HISTORY OF CURRENT PROBLEM
Patient with history of dysphagia and is familiar to this dp Patient with history of dysphagia and is familiar to this department from prior clinical swallow evaluation , please see full report for details

## 2017-05-24 NOTE — SWALLOW BEDSIDE ASSESSMENT ADULT - COMMENTS
Patient seen at chairside at which time she was alert and cooperative. Patient with size 4 , cuffless trach in place and able to produce voice with finger occlusion, however reduced vocal intensity and phonation time noted. PMV placed by clinician at which time she was able to produce short phrases /sentences during conversational discourse. Moderate dysarthria noted marked by slow, labored speech and imprecise articulatory precision, consistent with surgical history. However following approximately 5 minutes, patient with c/o increased shortness of breath.Consequently, PMV removed. SaO2 remained 97-98% throughout the assessment.   Pt s/p left partial glossectomy, left neck dissection and trach
Patient seen at chairside at which time she was alert and cooperative. Patient with size 4 , cuffless trach in place at baseline. Anatomical changes noted at trach site/stoma , RN and ENT PA advised. [facility] , copious secretions.  placed and patient with immediate evidence of increased SOB and unable to produced audible voice. Cap removed. PMV placed and patient able to tolerate for approximately 5 minutes before independently removing .With PMV , patient able to produce voice at the single word and phrase levels, reduced volume and reduced phonation time noted. Patient was also provided with puree and nectar thick liquids. Improved a-p transport time with reduced lingual stasis observed. In addition, delayed swallow trigger with reduced laryngeal elevation upon palpation noted. No clinical evidence of airway penetration observed.Tracheal suction performed by RN following PO trials at which time there was no evidence of food /food tinged secretions.

## 2017-05-24 NOTE — SWALLOW BEDSIDE ASSESSMENT ADULT - SWALLOW EVAL: DIAGNOSIS
1. Moderate -severe oral dysphagia for puree and nectar thick liquids marked by reduced oral grading and delayed a-p transport time with suspected posterior loss. Mild lingual stasis noted for puree which reduced with nectar thick liquids wash. In addition , patient with improved a-p transport with use of head tilt to the right. 2. Moderate-severe oral dysphagia for thin liquids marked by delayed oral transit time and suspected posterior loss. 3. Mild-moderate pharyngeal dysphagia for puree and nectar thick liquids marked by delayed swallow trigger with reduced laryngeal elevation upon palpation . No clinical evidence of airway penetration . 4. Moderate- severe pharyngeal dysphagia for thin liquids marked by delayed swallow trigger with reduced laryngeal elevation upon palpation . Wet vocal quality noted subsequent to deglutition indicative of airway penetration .
aphonic secondary to trach, oral pharyngeal dysphagia as described

## 2017-05-24 NOTE — PROGRESS NOTE ADULT - SUBJECTIVE AND OBJECTIVE BOX
patient seen and examined   did not tolerate capping/PMV during daytime yesterday w/ coughing and resp distress  capped at bedside this AM on rounds; desat to 88 w/ resp distress; cap removed  difficulty tolerating PO diet    AVSS; afeb  nad  awake, alert  breathing comfortably on trach collar  oc: clear, no bleeding, well healing hemiglossectomy  neck: soft and flat, incision c/d/i, 4CFS in place, no peristomal oozing  thick white secretions     A/P: s/p left partial glossectomy, left SND 1-4, trach on 5/17  - FNA of axillary node w/ IR today  - bolus TF  - home meds  - f/u am labs  - dvt ppx  - dispo planning  -SLP re-eval  -scopolamine patch  -pain control  -bowel regimen  -plan to scope   -pt: home w/ walker

## 2017-05-24 NOTE — SWALLOW BEDSIDE ASSESSMENT ADULT - ADDITIONAL RECOMMENDATIONS
This department to follow for speech/swallow therapy during this admission . Patient will benefit from continued speech/swallow therapy following discharge.
This department to follow for dysphagia/speech/voice  therapy as schedule permits.Patient would benefit from continued speech/swallow/voice therapy as following discharge.

## 2017-05-24 NOTE — SWALLOW BEDSIDE ASSESSMENT ADULT - ASR SWALLOW ASPIRATION MONITOR
position upright (90Y)/gurgly voice/pneumonia/throat clearing/oral hygiene/upper respiratory infection/cough/fever/change of breathing pattern
oral hygiene/gurgly voice/change of breathing pattern/pneumonia/throat clearing/upper respiratory infection/position upright (90Y)/cough/fever

## 2017-05-25 ENCOUNTER — RESULT REVIEW (OUTPATIENT)
Age: 72
End: 2017-05-25

## 2017-05-25 LAB
BUN SERPL-MCNC: 7 MG/DL — SIGNIFICANT CHANGE UP (ref 7–23)
CALCIUM SERPL-MCNC: 10 MG/DL — SIGNIFICANT CHANGE UP (ref 8.4–10.5)
CHLORIDE SERPL-SCNC: 96 MMOL/L — LOW (ref 98–107)
CO2 SERPL-SCNC: 27 MMOL/L — SIGNIFICANT CHANGE UP (ref 22–31)
CREAT SERPL-MCNC: 0.55 MG/DL — SIGNIFICANT CHANGE UP (ref 0.5–1.3)
GLUCOSE SERPL-MCNC: 165 MG/DL — HIGH (ref 70–99)
HCT VFR BLD CALC: 38.2 % — SIGNIFICANT CHANGE UP (ref 34.5–45)
HGB BLD-MCNC: 11.1 G/DL — LOW (ref 11.5–15.5)
MAGNESIUM SERPL-MCNC: 1.5 MG/DL — LOW (ref 1.6–2.6)
MCHC RBC-ENTMCNC: 21.6 PG — LOW (ref 27–34)
MCHC RBC-ENTMCNC: 29.1 % — LOW (ref 32–36)
MCV RBC AUTO: 74.2 FL — LOW (ref 80–100)
PHOSPHATE SERPL-MCNC: 3.2 MG/DL — SIGNIFICANT CHANGE UP (ref 2.5–4.5)
PLATELET # BLD AUTO: 320 K/UL — SIGNIFICANT CHANGE UP (ref 150–400)
PMV BLD: 10.7 FL — SIGNIFICANT CHANGE UP (ref 7–13)
POTASSIUM SERPL-MCNC: 3.4 MMOL/L — LOW (ref 3.5–5.3)
POTASSIUM SERPL-SCNC: 3.4 MMOL/L — LOW (ref 3.5–5.3)
RBC # BLD: 5.15 M/UL — SIGNIFICANT CHANGE UP (ref 3.8–5.2)
RBC # FLD: 16.2 % — HIGH (ref 10.3–14.5)
SODIUM SERPL-SCNC: 140 MMOL/L — SIGNIFICANT CHANGE UP (ref 135–145)
WBC # BLD: 9.44 K/UL — SIGNIFICANT CHANGE UP (ref 3.8–10.5)
WBC # FLD AUTO: 9.44 K/UL — SIGNIFICANT CHANGE UP (ref 3.8–10.5)

## 2017-05-25 RX ORDER — POTASSIUM CHLORIDE 20 MEQ
40 PACKET (EA) ORAL ONCE
Qty: 0 | Refills: 0 | Status: COMPLETED | OUTPATIENT
Start: 2017-05-25 | End: 2017-05-25

## 2017-05-25 RX ORDER — MAGNESIUM SULFATE 500 MG/ML
4 VIAL (ML) INJECTION ONCE
Qty: 0 | Refills: 0 | Status: COMPLETED | OUTPATIENT
Start: 2017-05-25 | End: 2017-05-25

## 2017-05-25 RX ADMIN — Medication 40 MILLIEQUIVALENT(S): at 12:57

## 2017-05-25 RX ADMIN — Medication 25 MICROGRAM(S): at 05:23

## 2017-05-25 RX ADMIN — ALBUTEROL 2.5 MILLIGRAM(S): 90 AEROSOL, METERED ORAL at 15:34

## 2017-05-25 RX ADMIN — Medication 81 MILLIGRAM(S): at 12:56

## 2017-05-25 RX ADMIN — ATORVASTATIN CALCIUM 20 MILLIGRAM(S): 80 TABLET, FILM COATED ORAL at 21:49

## 2017-05-25 RX ADMIN — Medication 40 MILLIGRAM(S): at 05:23

## 2017-05-25 RX ADMIN — OXYCODONE HYDROCHLORIDE 5 MILLIGRAM(S): 5 TABLET ORAL at 05:24

## 2017-05-25 RX ADMIN — HEPARIN SODIUM 5000 UNIT(S): 5000 INJECTION INTRAVENOUS; SUBCUTANEOUS at 05:23

## 2017-05-25 RX ADMIN — HEPARIN SODIUM 5000 UNIT(S): 5000 INJECTION INTRAVENOUS; SUBCUTANEOUS at 14:36

## 2017-05-25 RX ADMIN — CARBIDOPA AND LEVODOPA 1 TABLET(S): 25; 100 TABLET ORAL at 12:55

## 2017-05-25 RX ADMIN — ALBUTEROL 2.5 MILLIGRAM(S): 90 AEROSOL, METERED ORAL at 04:27

## 2017-05-25 RX ADMIN — Medication 100 GRAM(S): at 18:52

## 2017-05-25 RX ADMIN — ALBUTEROL 2.5 MILLIGRAM(S): 90 AEROSOL, METERED ORAL at 09:33

## 2017-05-25 RX ADMIN — Medication 2: at 12:53

## 2017-05-25 RX ADMIN — HEPARIN SODIUM 5000 UNIT(S): 5000 INJECTION INTRAVENOUS; SUBCUTANEOUS at 21:49

## 2017-05-25 RX ADMIN — CARBIDOPA AND LEVODOPA 1 TABLET(S): 25; 100 TABLET ORAL at 00:44

## 2017-05-25 RX ADMIN — ALBUTEROL 2.5 MILLIGRAM(S): 90 AEROSOL, METERED ORAL at 21:18

## 2017-05-25 RX ADMIN — CARBIDOPA AND LEVODOPA 1 TABLET(S): 25; 100 TABLET ORAL at 23:11

## 2017-05-25 RX ADMIN — CARBIDOPA AND LEVODOPA 1 TABLET(S): 25; 100 TABLET ORAL at 18:52

## 2017-05-25 RX ADMIN — INSULIN GLARGINE 30 UNIT(S): 100 INJECTION, SOLUTION SUBCUTANEOUS at 05:23

## 2017-05-25 RX ADMIN — CARBIDOPA AND LEVODOPA 1 TABLET(S): 25; 100 TABLET ORAL at 05:23

## 2017-05-25 RX ADMIN — LOSARTAN POTASSIUM 25 MILLIGRAM(S): 100 TABLET, FILM COATED ORAL at 05:24

## 2017-05-25 NOTE — DIETITIAN INITIAL EVALUATION ADULT. - NS AS NUTRI INTERV MEALS SNACK
1) Continue with current diet (Dysphagia 1 pureed/nectar consistency fluid). 2) Provide food preferences within diet consistency when requested by pt.

## 2017-05-25 NOTE — DIETITIAN INITIAL EVALUATION ADULT. - FACTORS AFF FOOD INTAKE
difficulty swallowing/S/p left partial glossectomy, s/p trach (5/17/17)./Catholic/ethnic/cultural/personal food preferences

## 2017-05-25 NOTE — DIETITIAN INITIAL EVALUATION ADULT. - OTHER INFO
Pt seen for extended LOS. Pt is 70 y/o female with past medical history inclusive of tongue cancer (started 1 year ago). Now pt s/p Left Glossectomy, left Neck Dissection and Tracheostomy 5/17/17. Noted NG tube feeding d/c on (5/22) after speech and swallow eval with recommendations "Dysphagia 1 diet with nectar thick liquids". Pt reports good appetite and PO intake at present; consumes 50-70% of her meals (RN confirmed). She denies any nausea, vomiting, constipation or diarrhea at this times. No chewing or swallowing difficulties with current diet consistency. Pt denies any significant weight loss in the past; reports usual body weight 184 pounds (83 kg), current weight 83.5 kg. No known food allergies. Food preferences obtained. RD remains available for any additional nutrition related recommendations.

## 2017-05-25 NOTE — SPEAKING VALVE EVALUATION - COMMENTS
Multiple attempts made to see patient this morning/afternoon for PMV placement, however, patient receiving nursing care and now having blood drawn. This department to follow up as schedule permits.

## 2017-05-25 NOTE — DIETITIAN INITIAL EVALUATION ADULT. - ADHERENCE
Regular diet consistency/thin liquids; consistent CHO diet; pt presents with A1c 7.4%. Reports checking her fingersticks 2 times daily and number usually between 119-121 mg/dl./good

## 2017-05-25 NOTE — PROGRESS NOTE ADULT - SUBJECTIVE AND OBJECTIVE BOX
patient seen and examined at bedside  no acute events  tolerating po   worked with SLP yesterday  not tolerating capping  but tolerating PMV  CXR done - no PNA    afvss  nad  awake, alert  breathing comfortably on trach collar  oc: clear, no bleeding, well healing hemiglossectomy  neck: soft and flat, incision c/d/i, 4CFS

## 2017-05-25 NOTE — PROGRESS NOTE ADULT - ASSESSMENT
s/p left partial glossectomy, left SND 1-4, trach on 5/17  - PMV for trach  - encourage po  - home meds  - am labs  - dvt ppx  - dispo planning

## 2017-05-26 LAB
BUN SERPL-MCNC: 11 MG/DL — SIGNIFICANT CHANGE UP (ref 7–23)
CALCIUM SERPL-MCNC: 9.7 MG/DL — SIGNIFICANT CHANGE UP (ref 8.4–10.5)
CHLORIDE SERPL-SCNC: 96 MMOL/L — LOW (ref 98–107)
CO2 SERPL-SCNC: 43 MMOL/L — HIGH (ref 22–31)
CREAT SERPL-MCNC: 0.54 MG/DL — SIGNIFICANT CHANGE UP (ref 0.5–1.3)
GLUCOSE SERPL-MCNC: 126 MG/DL — HIGH (ref 70–99)
MAGNESIUM SERPL-MCNC: 2.3 MG/DL — SIGNIFICANT CHANGE UP (ref 1.6–2.6)
PHOSPHATE SERPL-MCNC: 3.3 MG/DL — SIGNIFICANT CHANGE UP (ref 2.5–4.5)
POTASSIUM SERPL-MCNC: 3.5 MMOL/L — SIGNIFICANT CHANGE UP (ref 3.5–5.3)
POTASSIUM SERPL-SCNC: 3.5 MMOL/L — SIGNIFICANT CHANGE UP (ref 3.5–5.3)
SODIUM SERPL-SCNC: 139 MMOL/L — SIGNIFICANT CHANGE UP (ref 135–145)

## 2017-05-26 RX ORDER — POTASSIUM CHLORIDE 20 MEQ
40 PACKET (EA) ORAL ONCE
Qty: 0 | Refills: 0 | Status: COMPLETED | OUTPATIENT
Start: 2017-05-26 | End: 2017-05-26

## 2017-05-26 RX ADMIN — ALBUTEROL 2.5 MILLIGRAM(S): 90 AEROSOL, METERED ORAL at 17:31

## 2017-05-26 RX ADMIN — CARBIDOPA AND LEVODOPA 1 TABLET(S): 25; 100 TABLET ORAL at 06:36

## 2017-05-26 RX ADMIN — ATORVASTATIN CALCIUM 20 MILLIGRAM(S): 80 TABLET, FILM COATED ORAL at 21:53

## 2017-05-26 RX ADMIN — ALBUTEROL 2.5 MILLIGRAM(S): 90 AEROSOL, METERED ORAL at 21:07

## 2017-05-26 RX ADMIN — Medication 40 MILLIGRAM(S): at 06:35

## 2017-05-26 RX ADMIN — ALBUTEROL 2.5 MILLIGRAM(S): 90 AEROSOL, METERED ORAL at 10:18

## 2017-05-26 RX ADMIN — HEPARIN SODIUM 5000 UNIT(S): 5000 INJECTION INTRAVENOUS; SUBCUTANEOUS at 06:35

## 2017-05-26 RX ADMIN — Medication 25 MICROGRAM(S): at 06:35

## 2017-05-26 RX ADMIN — HEPARIN SODIUM 5000 UNIT(S): 5000 INJECTION INTRAVENOUS; SUBCUTANEOUS at 21:53

## 2017-05-26 RX ADMIN — CARBIDOPA AND LEVODOPA 1 TABLET(S): 25; 100 TABLET ORAL at 13:05

## 2017-05-26 RX ADMIN — LAMOTRIGINE 25 MILLIGRAM(S): 25 TABLET, ORALLY DISINTEGRATING ORAL at 18:56

## 2017-05-26 RX ADMIN — CARBIDOPA AND LEVODOPA 1 TABLET(S): 25; 100 TABLET ORAL at 18:56

## 2017-05-26 RX ADMIN — INSULIN GLARGINE 30 UNIT(S): 100 INJECTION, SOLUTION SUBCUTANEOUS at 07:10

## 2017-05-26 RX ADMIN — HEPARIN SODIUM 5000 UNIT(S): 5000 INJECTION INTRAVENOUS; SUBCUTANEOUS at 14:34

## 2017-05-26 RX ADMIN — CARBIDOPA AND LEVODOPA 1 TABLET(S): 25; 100 TABLET ORAL at 23:50

## 2017-05-26 RX ADMIN — Medication 81 MILLIGRAM(S): at 13:05

## 2017-05-26 RX ADMIN — ALBUTEROL 2.5 MILLIGRAM(S): 90 AEROSOL, METERED ORAL at 03:52

## 2017-05-26 RX ADMIN — Medication 40 MILLIEQUIVALENT(S): at 13:04

## 2017-05-26 NOTE — SPEAKING VALVE EVALUATION - REMOVE VALVE FOR
Increase RR (1.5 x baseline)/Diaphoresis/Increased work of breathing/SpO2 < 92%/Increased HR (1.5 x baseline)/Evidence of air trapping/Excessive coughing/Aerosolized respiratory treatments/Sleep/Subjective complaints

## 2017-05-26 NOTE — SPEAKING VALVE EVALUATION - LEVEL OF SUPERVISION - DESCRIBE
advised SLP or medical professional is in room during placement of PMV given pt co difficulty breathing as well as difficulty with independent placement and removal of valve.

## 2017-05-26 NOTE — SPEAKING VALVE EVALUATION - COMMENTS
Pt was alert and cooperative for a clinical evaluation of communicative function this am with utilization of a Passe Magno Valve. Per charting, pt is a 70 y/o female s/p left partial glossectomy, left neck dissection and tracheostomy on 5/17/2017. At the time of today's assessment, pt presents chairside with trach in place. Pt was alert and cooperative for a clinical evaluation of communicative function this am with utilization of a Passe Magno Valve. Per charting, pt is a 70 y/o female s/p left partial glossectomy, left neck dissection and tracheostomy on 5/17/2017. At the time of today's assessment, pt presents chairside with trach in place. Pt able to produce audible phonation following digital occlusion of trach site. During low-level, conversational discourse, the pt demonstrated imprecise contact of the articulators which negatively impacted speech intelligibility, consistent with surgical history. SaO2 levels noted to remain between 96%-100% during today's assessment.

## 2017-05-26 NOTE — SPEAKING VALVE EVALUATION - DIAGNOSTIC IMPRESSIONS
pt engaged in automatic speech tasks (i.e counting, days of the month) as well as low level conversational discourse with utilization of PMV for ~10 minutes. Pt demonstrates a dysphonic vocal quality with placement of PMV marked by reduced phonation time, reduced vocal intensity and vocal hoarseness, consistent with surgical history. Speech intelligibility further noted to be affected as marked by imprecise contact of the articulators during speech tasks, also consistent with surgical history. Pt was able to complete all presented phonatory exercises, though after ~8 minutes began to report shortness of breath with increased work of breathing noted, while Sa02 levels noted to remain at baseline level. Pt was educated on placement and removal of PMV, which she required clinician assistance in completing with each attempt.

## 2017-05-26 NOTE — PROGRESS NOTE ADULT - SUBJECTIVE AND OBJECTIVE BOX
patient seen and examined at bedside  no acute events  tolerating po   tolerating capping for few hours at a time only  tolerating PMV    afvss  nad  awake, alert  breathing comfortably on trach collar  oc: clear, no bleeding, well healing hemiglossectomy, tongue moving well  neck: soft and flat, incision c/d/i, 4CFS         Assessment and Plan:   · Assessment		  s/p left partial glossectomy, left SND 1-4, trach on 5/17  - PMV for trach  -continue capping trials during day  - encourage po  - home meds  - am labs  - dvt ppx  - dispo planning

## 2017-05-27 LAB
BUN SERPL-MCNC: 12 MG/DL — SIGNIFICANT CHANGE UP (ref 7–23)
CALCIUM SERPL-MCNC: 10 MG/DL — SIGNIFICANT CHANGE UP (ref 8.4–10.5)
CHLORIDE SERPL-SCNC: 95 MMOL/L — LOW (ref 98–107)
CO2 SERPL-SCNC: 30 MMOL/L — SIGNIFICANT CHANGE UP (ref 22–31)
CREAT SERPL-MCNC: 0.59 MG/DL — SIGNIFICANT CHANGE UP (ref 0.5–1.3)
GLUCOSE SERPL-MCNC: 117 MG/DL — HIGH (ref 70–99)
GRAM STN SPT: SIGNIFICANT CHANGE UP
MAGNESIUM SERPL-MCNC: 1.8 MG/DL — SIGNIFICANT CHANGE UP (ref 1.6–2.6)
PHOSPHATE SERPL-MCNC: 3 MG/DL — SIGNIFICANT CHANGE UP (ref 2.5–4.5)
POTASSIUM SERPL-MCNC: 3.4 MMOL/L — LOW (ref 3.5–5.3)
POTASSIUM SERPL-SCNC: 3.4 MMOL/L — LOW (ref 3.5–5.3)
SODIUM SERPL-SCNC: 139 MMOL/L — SIGNIFICANT CHANGE UP (ref 135–145)
SPECIMEN SOURCE: SIGNIFICANT CHANGE UP

## 2017-05-27 RX ORDER — MAGNESIUM OXIDE 400 MG ORAL TABLET 241.3 MG
400 TABLET ORAL
Qty: 0 | Refills: 0 | Status: COMPLETED | OUTPATIENT
Start: 2017-05-27 | End: 2017-05-29

## 2017-05-27 RX ORDER — HEPARIN SODIUM 5000 [USP'U]/ML
5000 INJECTION INTRAVENOUS; SUBCUTANEOUS EVERY 8 HOURS
Qty: 0 | Refills: 0 | Status: DISCONTINUED | OUTPATIENT
Start: 2017-05-27 | End: 2017-06-01

## 2017-05-27 RX ORDER — OXYCODONE HYDROCHLORIDE 5 MG/1
15 TABLET ORAL EVERY 4 HOURS
Qty: 0 | Refills: 0 | Status: DISCONTINUED | OUTPATIENT
Start: 2017-05-27 | End: 2017-06-01

## 2017-05-27 RX ORDER — OXYCODONE HYDROCHLORIDE 5 MG/1
5 TABLET ORAL EVERY 4 HOURS
Qty: 0 | Refills: 0 | Status: DISCONTINUED | OUTPATIENT
Start: 2017-05-27 | End: 2017-06-01

## 2017-05-27 RX ORDER — POTASSIUM CHLORIDE 20 MEQ
40 PACKET (EA) ORAL ONCE
Qty: 0 | Refills: 0 | Status: COMPLETED | OUTPATIENT
Start: 2017-05-27 | End: 2017-05-27

## 2017-05-27 RX ORDER — MAGNESIUM OXIDE 400 MG ORAL TABLET 241.3 MG
400 TABLET ORAL
Qty: 0 | Refills: 0 | Status: DISCONTINUED | OUTPATIENT
Start: 2017-05-27 | End: 2017-05-27

## 2017-05-27 RX ORDER — ALPRAZOLAM 0.25 MG
1 TABLET ORAL EVERY 8 HOURS
Qty: 0 | Refills: 0 | Status: DISCONTINUED | OUTPATIENT
Start: 2017-05-27 | End: 2017-06-01

## 2017-05-27 RX ADMIN — Medication 25 MICROGRAM(S): at 05:49

## 2017-05-27 RX ADMIN — SENNA PLUS 2 TABLET(S): 8.6 TABLET ORAL at 22:46

## 2017-05-27 RX ADMIN — OXYCODONE HYDROCHLORIDE 15 MILLIGRAM(S): 5 TABLET ORAL at 21:51

## 2017-05-27 RX ADMIN — ALBUTEROL 2.5 MILLIGRAM(S): 90 AEROSOL, METERED ORAL at 04:13

## 2017-05-27 RX ADMIN — SCOPALAMINE 1.5 MILLIGRAM(S): 1 PATCH, EXTENDED RELEASE TRANSDERMAL at 13:21

## 2017-05-27 RX ADMIN — Medication 40 MILLIEQUIVALENT(S): at 12:37

## 2017-05-27 RX ADMIN — ALBUTEROL 2.5 MILLIGRAM(S): 90 AEROSOL, METERED ORAL at 10:59

## 2017-05-27 RX ADMIN — CARBIDOPA AND LEVODOPA 1 TABLET(S): 25; 100 TABLET ORAL at 17:29

## 2017-05-27 RX ADMIN — HEPARIN SODIUM 5000 UNIT(S): 5000 INJECTION INTRAVENOUS; SUBCUTANEOUS at 05:49

## 2017-05-27 RX ADMIN — ATORVASTATIN CALCIUM 20 MILLIGRAM(S): 80 TABLET, FILM COATED ORAL at 22:46

## 2017-05-27 RX ADMIN — CARBIDOPA AND LEVODOPA 1 TABLET(S): 25; 100 TABLET ORAL at 23:23

## 2017-05-27 RX ADMIN — OXYCODONE HYDROCHLORIDE 15 MILLIGRAM(S): 5 TABLET ORAL at 20:53

## 2017-05-27 RX ADMIN — ALBUTEROL 2.5 MILLIGRAM(S): 90 AEROSOL, METERED ORAL at 15:20

## 2017-05-27 RX ADMIN — CARBIDOPA AND LEVODOPA 1 TABLET(S): 25; 100 TABLET ORAL at 05:49

## 2017-05-27 RX ADMIN — HEPARIN SODIUM 5000 UNIT(S): 5000 INJECTION INTRAVENOUS; SUBCUTANEOUS at 13:21

## 2017-05-27 RX ADMIN — ALBUTEROL 2.5 MILLIGRAM(S): 90 AEROSOL, METERED ORAL at 21:14

## 2017-05-27 RX ADMIN — CARBIDOPA AND LEVODOPA 1 TABLET(S): 25; 100 TABLET ORAL at 11:53

## 2017-05-27 RX ADMIN — Medication 81 MILLIGRAM(S): at 11:53

## 2017-05-27 RX ADMIN — LAMOTRIGINE 25 MILLIGRAM(S): 25 TABLET, ORALLY DISINTEGRATING ORAL at 17:29

## 2017-05-27 RX ADMIN — HEPARIN SODIUM 5000 UNIT(S): 5000 INJECTION INTRAVENOUS; SUBCUTANEOUS at 22:46

## 2017-05-27 RX ADMIN — LOSARTAN POTASSIUM 25 MILLIGRAM(S): 100 TABLET, FILM COATED ORAL at 05:49

## 2017-05-27 RX ADMIN — MAGNESIUM OXIDE 400 MG ORAL TABLET 400 MILLIGRAM(S): 241.3 TABLET ORAL at 17:28

## 2017-05-27 RX ADMIN — Medication 40 MILLIGRAM(S): at 05:49

## 2017-05-28 LAB
BUN SERPL-MCNC: 14 MG/DL — SIGNIFICANT CHANGE UP (ref 7–23)
CALCIUM SERPL-MCNC: 9.8 MG/DL — SIGNIFICANT CHANGE UP (ref 8.4–10.5)
CHLORIDE SERPL-SCNC: 95 MMOL/L — LOW (ref 98–107)
CO2 SERPL-SCNC: 31 MMOL/L — SIGNIFICANT CHANGE UP (ref 22–31)
CREAT SERPL-MCNC: 0.64 MG/DL — SIGNIFICANT CHANGE UP (ref 0.5–1.3)
GLUCOSE SERPL-MCNC: 123 MG/DL — HIGH (ref 70–99)
HCT VFR BLD CALC: 36.2 % — SIGNIFICANT CHANGE UP (ref 34.5–45)
HGB BLD-MCNC: 10.5 G/DL — LOW (ref 11.5–15.5)
MAGNESIUM SERPL-MCNC: 1.8 MG/DL — SIGNIFICANT CHANGE UP (ref 1.6–2.6)
MCHC RBC-ENTMCNC: 21.6 PG — LOW (ref 27–34)
MCHC RBC-ENTMCNC: 29 % — LOW (ref 32–36)
MCV RBC AUTO: 74.3 FL — LOW (ref 80–100)
PHOSPHATE SERPL-MCNC: 3 MG/DL — SIGNIFICANT CHANGE UP (ref 2.5–4.5)
PLATELET # BLD AUTO: 376 K/UL — SIGNIFICANT CHANGE UP (ref 150–400)
PMV BLD: 10.5 FL — SIGNIFICANT CHANGE UP (ref 7–13)
POTASSIUM SERPL-MCNC: 3.6 MMOL/L — SIGNIFICANT CHANGE UP (ref 3.5–5.3)
POTASSIUM SERPL-SCNC: 3.6 MMOL/L — SIGNIFICANT CHANGE UP (ref 3.5–5.3)
RBC # BLD: 4.87 M/UL — SIGNIFICANT CHANGE UP (ref 3.8–5.2)
RBC # FLD: 16.3 % — HIGH (ref 10.3–14.5)
SODIUM SERPL-SCNC: 139 MMOL/L — SIGNIFICANT CHANGE UP (ref 135–145)
WBC # BLD: 8.44 K/UL — SIGNIFICANT CHANGE UP (ref 3.8–10.5)
WBC # FLD AUTO: 8.44 K/UL — SIGNIFICANT CHANGE UP (ref 3.8–10.5)

## 2017-05-28 RX ADMIN — LAMOTRIGINE 25 MILLIGRAM(S): 25 TABLET, ORALLY DISINTEGRATING ORAL at 17:43

## 2017-05-28 RX ADMIN — Medication 25 MICROGRAM(S): at 07:25

## 2017-05-28 RX ADMIN — ALBUTEROL 2.5 MILLIGRAM(S): 90 AEROSOL, METERED ORAL at 21:47

## 2017-05-28 RX ADMIN — CARBIDOPA AND LEVODOPA 1 TABLET(S): 25; 100 TABLET ORAL at 17:43

## 2017-05-28 RX ADMIN — MAGNESIUM OXIDE 400 MG ORAL TABLET 400 MILLIGRAM(S): 241.3 TABLET ORAL at 17:43

## 2017-05-28 RX ADMIN — HEPARIN SODIUM 5000 UNIT(S): 5000 INJECTION INTRAVENOUS; SUBCUTANEOUS at 07:25

## 2017-05-28 RX ADMIN — ALBUTEROL 2.5 MILLIGRAM(S): 90 AEROSOL, METERED ORAL at 10:20

## 2017-05-28 RX ADMIN — MAGNESIUM OXIDE 400 MG ORAL TABLET 400 MILLIGRAM(S): 241.3 TABLET ORAL at 07:26

## 2017-05-28 RX ADMIN — ATORVASTATIN CALCIUM 20 MILLIGRAM(S): 80 TABLET, FILM COATED ORAL at 22:54

## 2017-05-28 RX ADMIN — LOSARTAN POTASSIUM 25 MILLIGRAM(S): 100 TABLET, FILM COATED ORAL at 07:26

## 2017-05-28 RX ADMIN — HEPARIN SODIUM 5000 UNIT(S): 5000 INJECTION INTRAVENOUS; SUBCUTANEOUS at 22:54

## 2017-05-28 RX ADMIN — Medication 81 MILLIGRAM(S): at 12:35

## 2017-05-28 RX ADMIN — CARBIDOPA AND LEVODOPA 1 TABLET(S): 25; 100 TABLET ORAL at 07:24

## 2017-05-28 RX ADMIN — INSULIN GLARGINE 30 UNIT(S): 100 INJECTION, SOLUTION SUBCUTANEOUS at 07:24

## 2017-05-28 RX ADMIN — HEPARIN SODIUM 5000 UNIT(S): 5000 INJECTION INTRAVENOUS; SUBCUTANEOUS at 13:21

## 2017-05-28 RX ADMIN — CARBIDOPA AND LEVODOPA 1 TABLET(S): 25; 100 TABLET ORAL at 23:16

## 2017-05-28 RX ADMIN — ALBUTEROL 2.5 MILLIGRAM(S): 90 AEROSOL, METERED ORAL at 03:24

## 2017-05-28 RX ADMIN — SENNA PLUS 2 TABLET(S): 8.6 TABLET ORAL at 22:54

## 2017-05-28 RX ADMIN — Medication 40 MILLIGRAM(S): at 07:25

## 2017-05-28 RX ADMIN — ALBUTEROL 2.5 MILLIGRAM(S): 90 AEROSOL, METERED ORAL at 16:11

## 2017-05-28 RX ADMIN — CARBIDOPA AND LEVODOPA 1 TABLET(S): 25; 100 TABLET ORAL at 12:35

## 2017-05-28 RX ADMIN — Medication 1 MILLIGRAM(S): at 17:48

## 2017-05-28 NOTE — PROGRESS NOTE ADULT - SUBJECTIVE AND OBJECTIVE BOX
patient seen and examined at bedside  no acute events  tolerating po       afvss  nad  awake, alert  breathing comfortably on trach collar  oc: clear, no bleeding, well healing hemiglossectomy, tongue moving well  neck: soft and flat, incision c/d/i, 4CFS

## 2017-05-28 NOTE — PROGRESS NOTE ADULT - ASSESSMENT
s/p left partial glossectomy, left SND 1-4, trach on 5/17  - PMV for trach  - continue capping trials during day  - encourage po  - home meds  - am labs  - dvt ppx  - dispo planning  - f/u trach cx

## 2017-05-29 LAB
BACTERIA SPT RESP CULT: SIGNIFICANT CHANGE UP
BUN SERPL-MCNC: 11 MG/DL — SIGNIFICANT CHANGE UP (ref 7–23)
CALCIUM SERPL-MCNC: 9.9 MG/DL — SIGNIFICANT CHANGE UP (ref 8.4–10.5)
CHLORIDE SERPL-SCNC: 97 MMOL/L — LOW (ref 98–107)
CO2 SERPL-SCNC: 29 MMOL/L — SIGNIFICANT CHANGE UP (ref 22–31)
CREAT SERPL-MCNC: 0.54 MG/DL — SIGNIFICANT CHANGE UP (ref 0.5–1.3)
GLUCOSE SERPL-MCNC: 104 MG/DL — HIGH (ref 70–99)
MAGNESIUM SERPL-MCNC: 2 MG/DL — SIGNIFICANT CHANGE UP (ref 1.6–2.6)
PHOSPHATE SERPL-MCNC: 3.1 MG/DL — SIGNIFICANT CHANGE UP (ref 2.5–4.5)
POTASSIUM SERPL-MCNC: 3.5 MMOL/L — SIGNIFICANT CHANGE UP (ref 3.5–5.3)
POTASSIUM SERPL-SCNC: 3.5 MMOL/L — SIGNIFICANT CHANGE UP (ref 3.5–5.3)
SODIUM SERPL-SCNC: 141 MMOL/L — SIGNIFICANT CHANGE UP (ref 135–145)

## 2017-05-29 RX ADMIN — ALBUTEROL 2.5 MILLIGRAM(S): 90 AEROSOL, METERED ORAL at 11:05

## 2017-05-29 RX ADMIN — HEPARIN SODIUM 5000 UNIT(S): 5000 INJECTION INTRAVENOUS; SUBCUTANEOUS at 13:56

## 2017-05-29 RX ADMIN — LOSARTAN POTASSIUM 25 MILLIGRAM(S): 100 TABLET, FILM COATED ORAL at 07:15

## 2017-05-29 RX ADMIN — CARBIDOPA AND LEVODOPA 1 TABLET(S): 25; 100 TABLET ORAL at 23:28

## 2017-05-29 RX ADMIN — Medication 81 MILLIGRAM(S): at 12:05

## 2017-05-29 RX ADMIN — INSULIN GLARGINE 30 UNIT(S): 100 INJECTION, SOLUTION SUBCUTANEOUS at 07:15

## 2017-05-29 RX ADMIN — CARBIDOPA AND LEVODOPA 1 TABLET(S): 25; 100 TABLET ORAL at 17:18

## 2017-05-29 RX ADMIN — CARBIDOPA AND LEVODOPA 1 TABLET(S): 25; 100 TABLET ORAL at 12:05

## 2017-05-29 RX ADMIN — Medication 40 MILLIGRAM(S): at 07:14

## 2017-05-29 RX ADMIN — HEPARIN SODIUM 5000 UNIT(S): 5000 INJECTION INTRAVENOUS; SUBCUTANEOUS at 22:17

## 2017-05-29 RX ADMIN — MAGNESIUM OXIDE 400 MG ORAL TABLET 400 MILLIGRAM(S): 241.3 TABLET ORAL at 07:15

## 2017-05-29 RX ADMIN — HEPARIN SODIUM 5000 UNIT(S): 5000 INJECTION INTRAVENOUS; SUBCUTANEOUS at 07:14

## 2017-05-29 RX ADMIN — ATORVASTATIN CALCIUM 20 MILLIGRAM(S): 80 TABLET, FILM COATED ORAL at 22:17

## 2017-05-29 RX ADMIN — ALBUTEROL 2.5 MILLIGRAM(S): 90 AEROSOL, METERED ORAL at 03:30

## 2017-05-29 RX ADMIN — Medication 25 MICROGRAM(S): at 07:15

## 2017-05-29 RX ADMIN — ALBUTEROL 2.5 MILLIGRAM(S): 90 AEROSOL, METERED ORAL at 16:29

## 2017-05-29 RX ADMIN — ALBUTEROL 2.5 MILLIGRAM(S): 90 AEROSOL, METERED ORAL at 21:40

## 2017-05-29 RX ADMIN — Medication 1 MILLIGRAM(S): at 20:19

## 2017-05-29 RX ADMIN — SENNA PLUS 2 TABLET(S): 8.6 TABLET ORAL at 22:16

## 2017-05-29 RX ADMIN — LAMOTRIGINE 25 MILLIGRAM(S): 25 TABLET, ORALLY DISINTEGRATING ORAL at 17:12

## 2017-05-29 RX ADMIN — CARBIDOPA AND LEVODOPA 1 TABLET(S): 25; 100 TABLET ORAL at 07:14

## 2017-05-29 NOTE — PROGRESS NOTE ADULT - SUBJECTIVE AND OBJECTIVE BOX
patient seen and examined at bedside  no acute events  tolerating po, tolerating finger occlusion at bedside    afvss  nad  awake, alert  breathing comfortably on trach collar  oc: clear, no bleeding, well healing hemiglossectomy, tongue moving well  neck: soft and flat, incision c/d/i, 4CFS       Assessment and Plan:   · Assessment		  s/p left partial glossectomy, left SND 1-4, trach on 5/17  - cap for trach  - continue capping trials during day/night as tolerated  - encourage po  - home meds  - am labs  - dvt ppx  - dispo planning  - f/u trach cx

## 2017-05-30 RX ADMIN — Medication 1 MILLIGRAM(S): at 19:15

## 2017-05-30 RX ADMIN — ALBUTEROL 2.5 MILLIGRAM(S): 90 AEROSOL, METERED ORAL at 11:30

## 2017-05-30 RX ADMIN — CARBIDOPA AND LEVODOPA 1 TABLET(S): 25; 100 TABLET ORAL at 07:14

## 2017-05-30 RX ADMIN — LOSARTAN POTASSIUM 25 MILLIGRAM(S): 100 TABLET, FILM COATED ORAL at 07:14

## 2017-05-30 RX ADMIN — INSULIN GLARGINE 30 UNIT(S): 100 INJECTION, SOLUTION SUBCUTANEOUS at 07:13

## 2017-05-30 RX ADMIN — HEPARIN SODIUM 5000 UNIT(S): 5000 INJECTION INTRAVENOUS; SUBCUTANEOUS at 13:03

## 2017-05-30 RX ADMIN — ATORVASTATIN CALCIUM 20 MILLIGRAM(S): 80 TABLET, FILM COATED ORAL at 22:06

## 2017-05-30 RX ADMIN — LAMOTRIGINE 25 MILLIGRAM(S): 25 TABLET, ORALLY DISINTEGRATING ORAL at 18:16

## 2017-05-30 RX ADMIN — HEPARIN SODIUM 5000 UNIT(S): 5000 INJECTION INTRAVENOUS; SUBCUTANEOUS at 22:06

## 2017-05-30 RX ADMIN — Medication 40 MILLIGRAM(S): at 07:14

## 2017-05-30 RX ADMIN — CARBIDOPA AND LEVODOPA 1 TABLET(S): 25; 100 TABLET ORAL at 13:02

## 2017-05-30 RX ADMIN — ALBUTEROL 2.5 MILLIGRAM(S): 90 AEROSOL, METERED ORAL at 16:37

## 2017-05-30 RX ADMIN — ALBUTEROL 2.5 MILLIGRAM(S): 90 AEROSOL, METERED ORAL at 22:26

## 2017-05-30 RX ADMIN — CARBIDOPA AND LEVODOPA 1 TABLET(S): 25; 100 TABLET ORAL at 18:16

## 2017-05-30 RX ADMIN — Medication 1 MILLIGRAM(S): at 11:13

## 2017-05-30 RX ADMIN — Medication 81 MILLIGRAM(S): at 12:59

## 2017-05-30 RX ADMIN — ALBUTEROL 2.5 MILLIGRAM(S): 90 AEROSOL, METERED ORAL at 03:38

## 2017-05-30 RX ADMIN — HEPARIN SODIUM 5000 UNIT(S): 5000 INJECTION INTRAVENOUS; SUBCUTANEOUS at 07:13

## 2017-05-30 RX ADMIN — Medication 25 MICROGRAM(S): at 07:14

## 2017-05-30 NOTE — PROVIDER CONTACT NOTE (OTHER) - ACTION/TREATMENT ORDERED:
Continue to monitor patient. Pt may be on oral pills in the AM.
Asked Matias Goyal MD to change administration route OR come and put the NG tube back.
MD made aware
MD made aware.
MD will come see pt
MD will order one time dose of 15 units of lantus
No action taken at this moment.

## 2017-05-30 NOTE — PROVIDER CONTACT NOTE (OTHER) - NAME OF MD/NP/PA/DO NOTIFIED:
EDDIE EDMONDSON MD   [id number]
EDDIE EDMONDSON MD   [id number]
Yoshi Goyal
Yoshi Goyal
Yoshi Goyal MD
Miguel Berman MD (ENT team [id number])
LYDIA WHEELER MD
ADAM POSADA MD

## 2017-05-30 NOTE — PROVIDER CONTACT NOTE (OTHER) - RECOMMENDATIONS
Will attempt cap trial again later.
MD notified.
MD made aware
MD made aware. Also, reminded MD that NG tube has not yet been replaced.
MD notified
Switch med administration route.
give 15 units
give additional meds

## 2017-05-30 NOTE — PROVIDER CONTACT NOTE (OTHER) - ASSESSMENT
Ordered for cap trials. Tried to cap patient. She immediately destat to 85%  and started to show signs of difficulty breathing. She asked if we can try again later.
KFT was removed by patient by accident. R nostril CDI w/ no redness or drainage. Pt complains of no pain or discomfort.
A&Ox4 VSS excep /90
A&Ox4, VSS except /90. No shortness of breath or chest pain.
Patient capped after trach care done, not tolerating being capped for more than 2 minutes. Patient became restless, with laborated breathing and SpO2 dropped to 85%. Patient uncapped at the moment.
Patient level of orientation difficult to assess. Patient given Lorazepam gabriel SICU, extremely lethargic upon arrival.
pt alert and oriented, unable to speak due to trached. denied headache, pain, no n/v.
pt alert and oriented, unable to speak due to trached. no s/s of hypoglycemia present

## 2017-05-30 NOTE — PROVIDER CONTACT NOTE (OTHER) - BACKGROUND
s/p partial glossectomy, trach 5/17
Patient s/p left side glossectomy, left side neck dissection, and tracheostomy
s/p L partial glossectomy, L neck dissection, Trache
s/p Left glossectomy, Left neck dissection & tracheostomy.
s/p left glossectomy; Left neck dissection & tracheostomy.
s/p left glossectomy; left neck dissection & tracheostomy
s/p trach, L neck dissection,
s/p trach, L neck dissection,

## 2017-05-30 NOTE — PROVIDER CONTACT NOTE (OTHER) - SITUATION
KFT was removed by patient by accident. R nostril CDI w/ no redness or drainage.
, 30 units of lantus due
/80 post bp meds
Patient not tolerating capping trial
Rec'd patient from SICU with no NG tube in place. Patient self discontinued while in SICU.
Routine vitals in am. Patient /90. Patient not symptomatic.
Suggest administration route be changed to IV on morning meds that are supposed to be given through the NG tube.

## 2017-05-30 NOTE — PROGRESS NOTE ADULT - SUBJECTIVE AND OBJECTIVE BOX
patient seen and examined at bedside  no acute events  tolerating po, tolerating finger occlusion at bedside. Not tolerating capping trials    afvss  nad  awake, alert  breathing comfortably on trach collar  oc: clear, no bleeding, well healing hemiglossectomy, tongue moving well  neck: soft and flat, incision c/d/i, 4CFS       Assessment and Plan:   · Assessment		  s/p left partial glossectomy, left SND 1-4, trach on 5/17  - cap for trach  - continue capping trials during day/night as tolerated  - encourage po  - home meds  - dvt ppx  - dispo planning  - f/u trach cx

## 2017-05-30 NOTE — PROVIDER CONTACT NOTE (OTHER) - DATE AND TIME:
22-May-2017 00:46
30-May-2017 02:50
20-May-2017 00:02
20-May-2017 05:11
20-May-2017 05:26
21-May-2017 07:00
21-May-2017 07:32
22-May-2017 16:52

## 2017-05-31 PROCEDURE — 31502 CHANGE OF WINDPIPE AIRWAY: CPT

## 2017-05-31 RX ADMIN — INSULIN GLARGINE 30 UNIT(S): 100 INJECTION, SOLUTION SUBCUTANEOUS at 06:23

## 2017-05-31 RX ADMIN — Medication 25 MICROGRAM(S): at 06:21

## 2017-05-31 RX ADMIN — Medication 40 MILLIGRAM(S): at 06:22

## 2017-05-31 RX ADMIN — ALBUTEROL 2.5 MILLIGRAM(S): 90 AEROSOL, METERED ORAL at 10:25

## 2017-05-31 RX ADMIN — CARBIDOPA AND LEVODOPA 1 TABLET(S): 25; 100 TABLET ORAL at 17:33

## 2017-05-31 RX ADMIN — OXYCODONE HYDROCHLORIDE 15 MILLIGRAM(S): 5 TABLET ORAL at 23:40

## 2017-05-31 RX ADMIN — CARBIDOPA AND LEVODOPA 1 TABLET(S): 25; 100 TABLET ORAL at 13:13

## 2017-05-31 RX ADMIN — Medication 1 MILLIGRAM(S): at 11:40

## 2017-05-31 RX ADMIN — ALBUTEROL 2.5 MILLIGRAM(S): 90 AEROSOL, METERED ORAL at 16:25

## 2017-05-31 RX ADMIN — HEPARIN SODIUM 5000 UNIT(S): 5000 INJECTION INTRAVENOUS; SUBCUTANEOUS at 06:22

## 2017-05-31 RX ADMIN — HEPARIN SODIUM 5000 UNIT(S): 5000 INJECTION INTRAVENOUS; SUBCUTANEOUS at 22:40

## 2017-05-31 RX ADMIN — Medication 6: at 18:47

## 2017-05-31 RX ADMIN — CARBIDOPA AND LEVODOPA 1 TABLET(S): 25; 100 TABLET ORAL at 00:24

## 2017-05-31 RX ADMIN — LAMOTRIGINE 25 MILLIGRAM(S): 25 TABLET, ORALLY DISINTEGRATING ORAL at 17:33

## 2017-05-31 RX ADMIN — OXYCODONE HYDROCHLORIDE 15 MILLIGRAM(S): 5 TABLET ORAL at 22:40

## 2017-05-31 RX ADMIN — CARBIDOPA AND LEVODOPA 1 TABLET(S): 25; 100 TABLET ORAL at 23:25

## 2017-05-31 RX ADMIN — CARBIDOPA AND LEVODOPA 1 TABLET(S): 25; 100 TABLET ORAL at 06:21

## 2017-05-31 RX ADMIN — ALBUTEROL 2.5 MILLIGRAM(S): 90 AEROSOL, METERED ORAL at 21:32

## 2017-05-31 RX ADMIN — ALBUTEROL 2.5 MILLIGRAM(S): 90 AEROSOL, METERED ORAL at 03:17

## 2017-05-31 RX ADMIN — HEPARIN SODIUM 5000 UNIT(S): 5000 INJECTION INTRAVENOUS; SUBCUTANEOUS at 13:14

## 2017-05-31 RX ADMIN — Medication 81 MILLIGRAM(S): at 13:18

## 2017-05-31 RX ADMIN — ATORVASTATIN CALCIUM 20 MILLIGRAM(S): 80 TABLET, FILM COATED ORAL at 22:40

## 2017-05-31 NOTE — PROGRESS NOTE ADULT - SUBJECTIVE AND OBJECTIVE BOX
patient seen and examined at bedside  no acute events  tolerating po, tolerating finger occlusion at bedside    afvss  nad  awake, alert  breathing comfortably on trach collar  oc: clear, no bleeding, well healing hemiglossectomy, tongue moving well  neck: soft and flat, incision c/d/i, 4CFS       Assessment and Plan:   · Assessment		  s/p left partial glossectomy, left SND 1-4, trach on 5/17  - cap for trach  - continue capping trials during day/night as tolerated  - encourage po  - home meds  - dvt ppx  - dispo planning  - f/u trach cx

## 2017-06-01 VITALS — OXYGEN SATURATION: 95 %

## 2017-06-01 RX ORDER — SENNA PLUS 8.6 MG/1
2 TABLET ORAL
Qty: 0 | Refills: 0 | COMMUNITY
Start: 2017-06-01

## 2017-06-01 RX ORDER — ATORVASTATIN CALCIUM 80 MG/1
1 TABLET, FILM COATED ORAL
Qty: 0 | Refills: 0 | COMMUNITY
Start: 2017-06-01

## 2017-06-01 RX ORDER — OXYCODONE HYDROCHLORIDE 5 MG/1
1 TABLET ORAL
Qty: 28 | Refills: 0 | OUTPATIENT
Start: 2017-06-01 | End: 2017-06-08

## 2017-06-01 RX ADMIN — ALBUTEROL 2.5 MILLIGRAM(S): 90 AEROSOL, METERED ORAL at 03:19

## 2017-06-01 RX ADMIN — HEPARIN SODIUM 5000 UNIT(S): 5000 INJECTION INTRAVENOUS; SUBCUTANEOUS at 05:10

## 2017-06-01 RX ADMIN — CARBIDOPA AND LEVODOPA 1 TABLET(S): 25; 100 TABLET ORAL at 12:40

## 2017-06-01 RX ADMIN — Medication 25 MICROGRAM(S): at 05:10

## 2017-06-01 RX ADMIN — ALBUTEROL 2.5 MILLIGRAM(S): 90 AEROSOL, METERED ORAL at 10:55

## 2017-06-01 RX ADMIN — OXYCODONE HYDROCHLORIDE 15 MILLIGRAM(S): 5 TABLET ORAL at 05:11

## 2017-06-01 RX ADMIN — Medication 40 MILLIGRAM(S): at 05:10

## 2017-06-01 RX ADMIN — Medication 81 MILLIGRAM(S): at 12:40

## 2017-06-01 RX ADMIN — INSULIN GLARGINE 30 UNIT(S): 100 INJECTION, SOLUTION SUBCUTANEOUS at 09:07

## 2017-06-01 RX ADMIN — CARBIDOPA AND LEVODOPA 1 TABLET(S): 25; 100 TABLET ORAL at 05:10

## 2017-06-01 NOTE — PROGRESS NOTE ADULT - SUBJECTIVE AND OBJECTIVE BOX
patient seen and examined   no acute events  decannulated yesterday  desats to 88% overnight; placed on NC (CPAP at home for PASQUALE)  tolerating po  anterior neck dressing changed at bedside; pt tolerated well    AVSS  nad  awake, alert  breathing comfortably on RA, O2 sat>93%  oc: clear, no bleeding, well healing hemiglossectomy, tongue moving well  neck: soft and flat, incision c/d/i, neck dressing in place    axillary LN path: neg for malignancy      Assessment and Plan:   		  s/p left partial glossectomy, left SND 1-4, trach on 5/17  -OOB ad elton  -continuous pulse ox  - encourage po  - home meds  - dvt ppx  - dispo planning

## 2017-06-09 ENCOUNTER — APPOINTMENT (OUTPATIENT)
Dept: OTOLARYNGOLOGY | Facility: CLINIC | Age: 72
End: 2017-06-09

## 2017-06-09 VITALS
HEIGHT: 60 IN | BODY MASS INDEX: 36.52 KG/M2 | HEART RATE: 66 BPM | WEIGHT: 186 LBS | SYSTOLIC BLOOD PRESSURE: 123 MMHG | DIASTOLIC BLOOD PRESSURE: 73 MMHG

## 2017-06-14 ENCOUNTER — OUTPATIENT (OUTPATIENT)
Dept: OUTPATIENT SERVICES | Facility: HOSPITAL | Age: 72
LOS: 1 days | Discharge: HOME | End: 2017-06-14

## 2017-06-14 DIAGNOSIS — Z96.611 PRESENCE OF RIGHT ARTIFICIAL SHOULDER JOINT: Chronic | ICD-10-CM

## 2017-06-14 DIAGNOSIS — E66.9 OBESITY, UNSPECIFIED: Chronic | ICD-10-CM

## 2017-06-14 DIAGNOSIS — Z95.5 PRESENCE OF CORONARY ANGIOPLASTY IMPLANT AND GRAFT: Chronic | ICD-10-CM

## 2017-06-14 DIAGNOSIS — Z96.612 PRESENCE OF LEFT ARTIFICIAL SHOULDER JOINT: Chronic | ICD-10-CM

## 2017-06-14 DIAGNOSIS — Z95.2 PRESENCE OF PROSTHETIC HEART VALVE: Chronic | ICD-10-CM

## 2017-06-15 ENCOUNTER — APPOINTMENT (OUTPATIENT)
Dept: RADIATION ONCOLOGY | Facility: CLINIC | Age: 72
End: 2017-06-15

## 2017-06-15 VITALS
HEART RATE: 75 BPM | DIASTOLIC BLOOD PRESSURE: 74 MMHG | TEMPERATURE: 97.3 F | OXYGEN SATURATION: 93 % | SYSTOLIC BLOOD PRESSURE: 140 MMHG | HEIGHT: 60 IN | RESPIRATION RATE: 18 BRPM | WEIGHT: 174 LBS | BODY MASS INDEX: 34.16 KG/M2

## 2017-06-15 DIAGNOSIS — C02.9 MALIGNANT NEOPLASM OF TONGUE, UNSPECIFIED: ICD-10-CM

## 2017-06-15 RX ORDER — LAMOTRIGINE 100 MG/1
100 TABLET ORAL
Qty: 30 | Refills: 0 | Status: ACTIVE | COMMUNITY
Start: 2016-12-14

## 2017-07-06 ENCOUNTER — APPOINTMENT (OUTPATIENT)
Dept: OTOLARYNGOLOGY | Facility: CLINIC | Age: 72
End: 2017-07-06

## 2017-07-27 ENCOUNTER — APPOINTMENT (OUTPATIENT)
Dept: OTOLARYNGOLOGY | Facility: CLINIC | Age: 72
End: 2017-07-27
Payer: MEDICARE

## 2017-07-27 VITALS
HEART RATE: 70 BPM | BODY MASS INDEX: 34.16 KG/M2 | SYSTOLIC BLOOD PRESSURE: 111 MMHG | HEIGHT: 60 IN | WEIGHT: 174 LBS | DIASTOLIC BLOOD PRESSURE: 71 MMHG

## 2017-07-27 PROCEDURE — 99024 POSTOP FOLLOW-UP VISIT: CPT

## 2017-07-27 RX ORDER — ROTIGOTINE 6 MG/24H
6 PATCH, EXTENDED RELEASE TRANSDERMAL
Qty: 30 | Refills: 0 | Status: ACTIVE | COMMUNITY
Start: 2017-06-08

## 2017-07-28 ENCOUNTER — APPOINTMENT (OUTPATIENT)
Dept: PULMONOLOGY | Facility: CLINIC | Age: 72
End: 2017-07-28
Payer: MEDICARE

## 2017-07-28 DIAGNOSIS — R91.1 SOLITARY PULMONARY NODULE: ICD-10-CM

## 2017-07-28 DIAGNOSIS — Z99.89 OBSTRUCTIVE SLEEP APNEA (ADULT) (PEDIATRIC): ICD-10-CM

## 2017-07-28 DIAGNOSIS — Z87.891 PERSONAL HISTORY OF NICOTINE DEPENDENCE: ICD-10-CM

## 2017-07-28 DIAGNOSIS — R06.09 OTHER FORMS OF DYSPNEA: ICD-10-CM

## 2017-07-28 DIAGNOSIS — G47.33 OBSTRUCTIVE SLEEP APNEA (ADULT) (PEDIATRIC): ICD-10-CM

## 2017-07-28 DIAGNOSIS — J98.4 OTHER DISORDERS OF LUNG: ICD-10-CM

## 2017-07-28 PROCEDURE — 99214 OFFICE O/P EST MOD 30 MIN: CPT

## 2017-07-28 RX ORDER — SERTRALINE HYDROCHLORIDE 50 MG/1
50 TABLET, FILM COATED ORAL
Qty: 90 | Refills: 0 | Status: DISCONTINUED | COMMUNITY
Start: 2017-01-11 | End: 2017-07-28

## 2017-07-28 RX ORDER — IPRATROPIUM BROMIDE AND ALBUTEROL SULFATE 2.5; .5 MG/3ML; MG/3ML
0.5-2.5 (3) SOLUTION RESPIRATORY (INHALATION)
Qty: 3 | Refills: 3 | Status: ACTIVE | COMMUNITY
Start: 2017-07-28 | End: 1900-01-01

## 2017-08-08 ENCOUNTER — OUTPATIENT (OUTPATIENT)
Dept: OUTPATIENT SERVICES | Facility: HOSPITAL | Age: 72
LOS: 1 days | End: 2017-08-08
Payer: MEDICARE

## 2017-08-08 DIAGNOSIS — E66.9 OBESITY, UNSPECIFIED: Chronic | ICD-10-CM

## 2017-08-08 DIAGNOSIS — C02.9 MALIGNANT NEOPLASM OF TONGUE, UNSPECIFIED: ICD-10-CM

## 2017-08-08 DIAGNOSIS — Z96.611 PRESENCE OF RIGHT ARTIFICIAL SHOULDER JOINT: Chronic | ICD-10-CM

## 2017-08-08 DIAGNOSIS — Z51.89 ENCOUNTER FOR OTHER SPECIFIED AFTERCARE: ICD-10-CM

## 2017-08-08 DIAGNOSIS — R27.9 UNSPECIFIED LACK OF COORDINATION: ICD-10-CM

## 2017-08-08 DIAGNOSIS — R13.11 DYSPHAGIA, ORAL PHASE: ICD-10-CM

## 2017-08-08 DIAGNOSIS — Z96.612 PRESENCE OF LEFT ARTIFICIAL SHOULDER JOINT: Chronic | ICD-10-CM

## 2017-08-08 DIAGNOSIS — Z95.5 PRESENCE OF CORONARY ANGIOPLASTY IMPLANT AND GRAFT: Chronic | ICD-10-CM

## 2017-08-08 DIAGNOSIS — G20 PARKINSON'S DISEASE: ICD-10-CM

## 2017-08-08 DIAGNOSIS — Z95.2 PRESENCE OF PROSTHETIC HEART VALVE: Chronic | ICD-10-CM

## 2017-08-08 DIAGNOSIS — R47.1 DYSARTHRIA AND ANARTHRIA: ICD-10-CM

## 2017-08-31 ENCOUNTER — APPOINTMENT (OUTPATIENT)
Dept: OTOLARYNGOLOGY | Facility: CLINIC | Age: 72
End: 2017-08-31
Payer: MEDICARE

## 2017-08-31 VITALS
DIASTOLIC BLOOD PRESSURE: 83 MMHG | SYSTOLIC BLOOD PRESSURE: 153 MMHG | BODY MASS INDEX: 33.77 KG/M2 | WEIGHT: 172 LBS | HEART RATE: 65 BPM | HEIGHT: 60 IN

## 2017-08-31 PROCEDURE — 99214 OFFICE O/P EST MOD 30 MIN: CPT

## 2017-09-12 DIAGNOSIS — R13.10 DYSPHAGIA, UNSPECIFIED: ICD-10-CM

## 2017-09-12 DIAGNOSIS — I89.0 LYMPHEDEMA, NOT ELSEWHERE CLASSIFIED: ICD-10-CM

## 2017-09-13 RX ORDER — LIDOCAINE HYDROCHLORIDE 20 MG/ML
2 SOLUTION OROPHARYNGEAL
Qty: 4 | Refills: 0 | Status: ACTIVE | COMMUNITY
Start: 2017-09-13 | End: 1900-01-01

## 2017-10-12 ENCOUNTER — APPOINTMENT (OUTPATIENT)
Dept: OTOLARYNGOLOGY | Facility: CLINIC | Age: 72
End: 2017-10-12
Payer: MEDICARE

## 2017-10-12 VITALS
BODY MASS INDEX: 33.77 KG/M2 | WEIGHT: 172 LBS | HEIGHT: 60 IN | HEART RATE: 79 BPM | DIASTOLIC BLOOD PRESSURE: 97 MMHG | SYSTOLIC BLOOD PRESSURE: 168 MMHG

## 2017-10-12 PROCEDURE — 99214 OFFICE O/P EST MOD 30 MIN: CPT | Mod: 25

## 2017-10-12 PROCEDURE — 31575 DIAGNOSTIC LARYNGOSCOPY: CPT

## 2017-10-23 PROCEDURE — G8979: CPT | Mod: CJ

## 2017-10-23 PROCEDURE — G9158: CPT | Mod: CI

## 2017-10-23 PROCEDURE — G9186: CPT | Mod: CI

## 2017-10-23 PROCEDURE — G8999: CPT | Mod: CI

## 2017-10-23 PROCEDURE — 92610 EVALUATE SWALLOWING FUNCTION: CPT

## 2017-10-23 PROCEDURE — 97163 PT EVAL HIGH COMPLEX 45 MIN: CPT

## 2017-10-23 PROCEDURE — G8978: CPT | Mod: CK

## 2017-10-23 PROCEDURE — 92523 SPEECH SOUND LANG COMPREHEN: CPT

## 2017-10-23 PROCEDURE — G8996: CPT | Mod: CJ

## 2017-10-23 PROCEDURE — 92526 ORAL FUNCTION THERAPY: CPT

## 2017-10-23 PROCEDURE — G8997: CPT | Mod: CI

## 2017-11-07 ENCOUNTER — APPOINTMENT (OUTPATIENT)
Dept: ULTRASOUND IMAGING | Facility: CLINIC | Age: 72
End: 2017-11-07

## 2017-11-07 ENCOUNTER — OUTPATIENT (OUTPATIENT)
Dept: OUTPATIENT SERVICES | Facility: HOSPITAL | Age: 72
LOS: 1 days | End: 2017-11-07
Payer: MEDICARE

## 2017-11-07 ENCOUNTER — APPOINTMENT (OUTPATIENT)
Dept: CT IMAGING | Facility: CLINIC | Age: 72
End: 2017-11-07
Payer: MEDICARE

## 2017-11-07 DIAGNOSIS — Z96.611 PRESENCE OF RIGHT ARTIFICIAL SHOULDER JOINT: Chronic | ICD-10-CM

## 2017-11-07 DIAGNOSIS — Z95.2 PRESENCE OF PROSTHETIC HEART VALVE: Chronic | ICD-10-CM

## 2017-11-07 DIAGNOSIS — E66.9 OBESITY, UNSPECIFIED: Chronic | ICD-10-CM

## 2017-11-07 DIAGNOSIS — C02.9 MALIGNANT NEOPLASM OF TONGUE, UNSPECIFIED: ICD-10-CM

## 2017-11-07 DIAGNOSIS — Z96.612 PRESENCE OF LEFT ARTIFICIAL SHOULDER JOINT: Chronic | ICD-10-CM

## 2017-11-07 DIAGNOSIS — Z95.5 PRESENCE OF CORONARY ANGIOPLASTY IMPLANT AND GRAFT: Chronic | ICD-10-CM

## 2017-11-07 PROCEDURE — 76700 US EXAM ABDOM COMPLETE: CPT

## 2017-11-07 PROCEDURE — 70491 CT SOFT TISSUE NECK W/DYE: CPT

## 2017-11-07 PROCEDURE — 82565 ASSAY OF CREATININE: CPT

## 2017-11-07 PROCEDURE — 76700 US EXAM ABDOM COMPLETE: CPT | Mod: 26

## 2017-11-07 PROCEDURE — 70491 CT SOFT TISSUE NECK W/DYE: CPT | Mod: 26

## 2017-11-10 DIAGNOSIS — R16.1 SPLENOMEGALY, NOT ELSEWHERE CLASSIFIED: ICD-10-CM

## 2017-11-10 DIAGNOSIS — K74.60 UNSPECIFIED CIRRHOSIS OF LIVER: ICD-10-CM

## 2017-11-10 DIAGNOSIS — C02.9 MALIGNANT NEOPLASM OF TONGUE, UNSPECIFIED: ICD-10-CM

## 2017-11-16 ENCOUNTER — APPOINTMENT (OUTPATIENT)
Dept: OTOLARYNGOLOGY | Facility: CLINIC | Age: 72
End: 2017-11-16
Payer: MEDICARE

## 2017-11-16 VITALS
SYSTOLIC BLOOD PRESSURE: 138 MMHG | BODY MASS INDEX: 33.77 KG/M2 | WEIGHT: 172 LBS | HEART RATE: 83 BPM | HEIGHT: 60 IN | DIASTOLIC BLOOD PRESSURE: 84 MMHG

## 2017-11-16 PROCEDURE — 99214 OFFICE O/P EST MOD 30 MIN: CPT

## 2017-12-21 ENCOUNTER — OUTPATIENT (OUTPATIENT)
Dept: OUTPATIENT SERVICES | Facility: HOSPITAL | Age: 72
LOS: 1 days | End: 2017-12-21
Payer: MEDICARE

## 2017-12-21 DIAGNOSIS — Z95.5 PRESENCE OF CORONARY ANGIOPLASTY IMPLANT AND GRAFT: Chronic | ICD-10-CM

## 2017-12-21 DIAGNOSIS — D64.89 OTHER SPECIFIED ANEMIAS: ICD-10-CM

## 2017-12-21 DIAGNOSIS — Z95.2 PRESENCE OF PROSTHETIC HEART VALVE: Chronic | ICD-10-CM

## 2017-12-21 DIAGNOSIS — Z96.611 PRESENCE OF RIGHT ARTIFICIAL SHOULDER JOINT: Chronic | ICD-10-CM

## 2017-12-21 DIAGNOSIS — E66.9 OBESITY, UNSPECIFIED: Chronic | ICD-10-CM

## 2017-12-21 DIAGNOSIS — Z96.612 PRESENCE OF LEFT ARTIFICIAL SHOULDER JOINT: Chronic | ICD-10-CM

## 2017-12-21 LAB
BLD GP AB SCN SERPL QL: SIGNIFICANT CHANGE UP
HCT VFR BLD CALC: 30.8 % — LOW (ref 37–47)
HGB BLD-MCNC: 8.3 G/DL — LOW (ref 12–16)
MCHC RBC-ENTMCNC: 17.7 PG — LOW (ref 27–31)
MCHC RBC-ENTMCNC: 26.9 G/DL — LOW (ref 32–36)
MCV RBC AUTO: 65.5 FL — LOW (ref 81–99)
PLATELET # BLD AUTO: 230 K/UL — SIGNIFICANT CHANGE UP (ref 150–400)
RBC # BLD: 4.7 M/UL — SIGNIFICANT CHANGE UP (ref 4.4–5.2)
RBC # FLD: 19 % — HIGH (ref 11–15.6)
TYPE + AB SCN PNL BLD: SIGNIFICANT CHANGE UP
WBC # BLD: 5.4 K/UL — SIGNIFICANT CHANGE UP (ref 4.8–10.8)
WBC # FLD AUTO: 5.4 K/UL — SIGNIFICANT CHANGE UP (ref 4.8–10.8)

## 2017-12-21 PROCEDURE — 86901 BLOOD TYPING SEROLOGIC RH(D): CPT

## 2017-12-21 PROCEDURE — 86900 BLOOD TYPING SEROLOGIC ABO: CPT

## 2017-12-21 PROCEDURE — 36415 COLL VENOUS BLD VENIPUNCTURE: CPT

## 2017-12-21 PROCEDURE — 86850 RBC ANTIBODY SCREEN: CPT

## 2017-12-21 PROCEDURE — 85027 COMPLETE CBC AUTOMATED: CPT

## 2017-12-21 RX ORDER — GLUCAGON INJECTION, SOLUTION 0.5 MG/.1ML
1 INJECTION, SOLUTION SUBCUTANEOUS ONCE
Qty: 0 | Refills: 0 | Status: DISCONTINUED | OUTPATIENT
Start: 2017-12-22 | End: 2018-01-06

## 2017-12-21 RX ORDER — CARBIDOPA AND LEVODOPA 25; 100 MG/1; MG/1
1 TABLET ORAL THREE TIMES A DAY
Qty: 0 | Refills: 0 | Status: DISCONTINUED | OUTPATIENT
Start: 2017-12-22 | End: 2018-01-06

## 2017-12-21 RX ORDER — DEXTROSE 50 % IN WATER 50 %
25 SYRINGE (ML) INTRAVENOUS ONCE
Qty: 0 | Refills: 0 | Status: DISCONTINUED | OUTPATIENT
Start: 2017-12-22 | End: 2018-01-06

## 2017-12-21 RX ORDER — SODIUM CHLORIDE 9 MG/ML
1000 INJECTION, SOLUTION INTRAVENOUS
Qty: 0 | Refills: 0 | Status: DISCONTINUED | OUTPATIENT
Start: 2017-12-22 | End: 2018-01-06

## 2017-12-21 RX ORDER — DEXTROSE 50 % IN WATER 50 %
12.5 SYRINGE (ML) INTRAVENOUS ONCE
Qty: 0 | Refills: 0 | Status: DISCONTINUED | OUTPATIENT
Start: 2017-12-22 | End: 2018-01-06

## 2017-12-21 RX ORDER — DEXTROSE 50 % IN WATER 50 %
1 SYRINGE (ML) INTRAVENOUS ONCE
Qty: 0 | Refills: 0 | Status: DISCONTINUED | OUTPATIENT
Start: 2017-12-22 | End: 2018-01-06

## 2017-12-21 RX ORDER — ALPRAZOLAM 0.25 MG
1 TABLET ORAL EVERY 6 HOURS
Qty: 0 | Refills: 0 | Status: DISCONTINUED | OUTPATIENT
Start: 2017-12-22 | End: 2017-12-22

## 2017-12-22 ENCOUNTER — OUTPATIENT (OUTPATIENT)
Dept: OUTPATIENT SERVICES | Facility: HOSPITAL | Age: 72
LOS: 1 days | End: 2017-12-22
Payer: MEDICARE

## 2017-12-22 VITALS
TEMPERATURE: 98 F | OXYGEN SATURATION: 98 % | DIASTOLIC BLOOD PRESSURE: 61 MMHG | RESPIRATION RATE: 18 BRPM | SYSTOLIC BLOOD PRESSURE: 153 MMHG | HEART RATE: 71 BPM

## 2017-12-22 VITALS
DIASTOLIC BLOOD PRESSURE: 63 MMHG | TEMPERATURE: 98 F | SYSTOLIC BLOOD PRESSURE: 130 MMHG | HEART RATE: 80 BPM | RESPIRATION RATE: 18 BRPM

## 2017-12-22 DIAGNOSIS — G20 PARKINSON'S DISEASE: ICD-10-CM

## 2017-12-22 DIAGNOSIS — Z95.5 PRESENCE OF CORONARY ANGIOPLASTY IMPLANT AND GRAFT: Chronic | ICD-10-CM

## 2017-12-22 DIAGNOSIS — Z95.2 PRESENCE OF PROSTHETIC HEART VALVE: Chronic | ICD-10-CM

## 2017-12-22 DIAGNOSIS — D64.89 OTHER SPECIFIED ANEMIAS: ICD-10-CM

## 2017-12-22 DIAGNOSIS — Z96.612 PRESENCE OF LEFT ARTIFICIAL SHOULDER JOINT: Chronic | ICD-10-CM

## 2017-12-22 DIAGNOSIS — Z96.611 PRESENCE OF RIGHT ARTIFICIAL SHOULDER JOINT: Chronic | ICD-10-CM

## 2017-12-22 DIAGNOSIS — E11.9 TYPE 2 DIABETES MELLITUS WITHOUT COMPLICATIONS: ICD-10-CM

## 2017-12-22 DIAGNOSIS — D63.8 ANEMIA IN OTHER CHRONIC DISEASES CLASSIFIED ELSEWHERE: ICD-10-CM

## 2017-12-22 DIAGNOSIS — C02.9 MALIGNANT NEOPLASM OF TONGUE, UNSPECIFIED: Chronic | ICD-10-CM

## 2017-12-22 DIAGNOSIS — E66.9 OBESITY, UNSPECIFIED: Chronic | ICD-10-CM

## 2017-12-22 LAB
BLD GP AB SCN SERPL QL: SIGNIFICANT CHANGE UP
GLUCOSE BLDC GLUCOMTR-MCNC: 109 MG/DL — HIGH (ref 70–99)
GLUCOSE BLDC GLUCOMTR-MCNC: 110 MG/DL — HIGH (ref 70–99)
GLUCOSE BLDC GLUCOMTR-MCNC: 194 MG/DL — HIGH (ref 70–99)
TYPE + AB SCN PNL BLD: SIGNIFICANT CHANGE UP

## 2017-12-22 PROCEDURE — 86920 COMPATIBILITY TEST SPIN: CPT

## 2017-12-22 PROCEDURE — P9016: CPT

## 2017-12-22 PROCEDURE — 86850 RBC ANTIBODY SCREEN: CPT

## 2017-12-22 PROCEDURE — 36430 TRANSFUSION BLD/BLD COMPNT: CPT

## 2017-12-22 PROCEDURE — 36415 COLL VENOUS BLD VENIPUNCTURE: CPT

## 2017-12-22 PROCEDURE — 86901 BLOOD TYPING SEROLOGIC RH(D): CPT

## 2017-12-22 PROCEDURE — 86900 BLOOD TYPING SEROLOGIC ABO: CPT

## 2017-12-22 PROCEDURE — 82962 GLUCOSE BLOOD TEST: CPT

## 2017-12-22 RX ORDER — ASPIRIN/CALCIUM CARB/MAGNESIUM 324 MG
1 TABLET ORAL
Qty: 0 | Refills: 0 | COMMUNITY

## 2017-12-22 RX ORDER — LAMOTRIGINE 25 MG/1
1 TABLET, ORALLY DISINTEGRATING ORAL
Qty: 0 | Refills: 0 | COMMUNITY

## 2017-12-22 RX ORDER — INSULIN LISPRO 100/ML
VIAL (ML) SUBCUTANEOUS ONCE
Qty: 0 | Refills: 0 | Status: COMPLETED | OUTPATIENT
Start: 2017-12-22 | End: 2017-12-22

## 2017-12-22 RX ORDER — ENOXAPARIN SODIUM 100 MG/ML
40 INJECTION SUBCUTANEOUS ONCE
Qty: 0 | Refills: 0 | Status: COMPLETED | OUTPATIENT
Start: 2017-12-22 | End: 2017-12-22

## 2017-12-22 RX ORDER — FUROSEMIDE 40 MG
40 TABLET ORAL ONCE
Qty: 0 | Refills: 0 | Status: COMPLETED | OUTPATIENT
Start: 2017-12-22 | End: 2017-12-22

## 2017-12-22 RX ORDER — CARBIDOPA AND LEVODOPA 25; 100 MG/1; MG/1
1 TABLET ORAL
Qty: 0 | Refills: 0 | COMMUNITY

## 2017-12-22 RX ORDER — LEVOTHYROXINE SODIUM 125 MCG
1 TABLET ORAL
Qty: 0 | Refills: 0 | COMMUNITY

## 2017-12-22 RX ADMIN — Medication 1 MILLIGRAM(S): at 10:34

## 2017-12-22 RX ADMIN — CARBIDOPA AND LEVODOPA 1 TABLET(S): 25; 100 TABLET ORAL at 14:34

## 2017-12-22 RX ADMIN — Medication 40 MILLIGRAM(S): at 15:47

## 2017-12-22 RX ADMIN — ENOXAPARIN SODIUM 40 MILLIGRAM(S): 100 INJECTION SUBCUTANEOUS at 14:19

## 2017-12-22 NOTE — H&P ADULT - PSH
Cancer determined by biopsy of tongue    H/O aortic valve replacement  History of valve replacement (cow, per pt). TAVR approximately 2015 per pt  History of coronary artery stent placement    History of total shoulder replacement, left    History of total shoulder replacement, right    Obesity    S/P cholecystectomy    S/P hysterectomy    S/P orthopedic surgery, follow-up exam  back  S/P shoulder surgery  Bilateral Rotator cuff  Stented coronary artery  pt does not remember when she had stent

## 2017-12-22 NOTE — H&P ADULT - HISTORY OF PRESENT ILLNESS
71 yo female with Parkinsons, CAD, DM, Htn, Hypothyroid and recent surgery of cancer of the tongue; found to have hgb = 7.6 and hypotensive and dizzy. Admitted for transfusion two units PRBCs

## 2017-12-22 NOTE — H&P ADULT - NSHPREVIEWOFSYSTEMS_GEN_ALL_CORE
REVIEW OF SYSTEMS    General: lightheaded, fatigued	    Skin/Breast: No rash  	  ENMT: No visual problems, no sore throat	    Respiratory and Thorax: No cough, No CP, No SOB  	  Cardiovascular: No CP, No palpitations    Gastrointestinal: No Abd pain, No N/V/D    Musculoskeletal: No Joint pain, No back pain	    Neurological: No headache    Psychiatric: No anxiety

## 2017-12-22 NOTE — H&P ADULT - PMH
Anxiety    Aortic stenosis    COPD (chronic obstructive pulmonary disease)    Diabetes    Hypertension    Hypothyroid    Parkinson disease    Retinal defect, left    Sleep apnea    Squamous cell cancer of tongue    Tongue cancer

## 2017-12-22 NOTE — H&P ADULT - NSHPPHYSICALEXAM_GEN_ALL_CORE
PHYSICAL EXAM:    Constitutional: Not in any distress    Eyes: No conjunctival injection    ENMT: No oral lesions    Neck: No nodes, no adenopathy    Back: Straight, no defects    Respiratory: clear b/l    Cardiovascular: RRR, no murmur    Gastrointestinal: soft, NT, ND    Extremities: No edema, no erythema    Neurological: no focal deficit    Skin: No rash

## 2017-12-26 ENCOUNTER — APPOINTMENT (OUTPATIENT)
Dept: PULMONOLOGY | Facility: CLINIC | Age: 72
End: 2017-12-26

## 2018-01-17 PROBLEM — C02.9 MALIGNANT NEOPLASM OF TONGUE, UNSPECIFIED: Chronic | Status: ACTIVE | Noted: 2017-12-21

## 2018-01-18 ENCOUNTER — APPOINTMENT (OUTPATIENT)
Dept: OTOLARYNGOLOGY | Facility: CLINIC | Age: 73
End: 2018-01-18

## 2018-01-25 ENCOUNTER — APPOINTMENT (OUTPATIENT)
Dept: OTOLARYNGOLOGY | Facility: CLINIC | Age: 73
End: 2018-01-25
Payer: MEDICARE

## 2018-01-25 VITALS
SYSTOLIC BLOOD PRESSURE: 157 MMHG | HEART RATE: 63 BPM | WEIGHT: 167 LBS | BODY MASS INDEX: 33.67 KG/M2 | DIASTOLIC BLOOD PRESSURE: 80 MMHG | HEIGHT: 59 IN

## 2018-01-25 DIAGNOSIS — R59.0 LOCALIZED ENLARGED LYMPH NODES: ICD-10-CM

## 2018-01-25 PROCEDURE — 99214 OFFICE O/P EST MOD 30 MIN: CPT | Mod: 25

## 2018-01-25 PROCEDURE — 31575 DIAGNOSTIC LARYNGOSCOPY: CPT

## 2018-01-25 RX ORDER — ALPRAZOLAM 0.5 MG/1
0.5 TABLET ORAL
Qty: 30 | Refills: 0 | Status: ACTIVE | COMMUNITY
Start: 2017-08-18

## 2018-01-28 ENCOUNTER — FORM ENCOUNTER (OUTPATIENT)
Age: 73
End: 2018-01-28

## 2018-01-29 ENCOUNTER — OUTPATIENT (OUTPATIENT)
Dept: OUTPATIENT SERVICES | Facility: HOSPITAL | Age: 73
LOS: 1 days | End: 2018-01-29

## 2018-01-29 ENCOUNTER — APPOINTMENT (OUTPATIENT)
Dept: NUCLEAR MEDICINE | Facility: CLINIC | Age: 73
End: 2018-01-29
Payer: MEDICARE

## 2018-01-29 DIAGNOSIS — Z96.611 PRESENCE OF RIGHT ARTIFICIAL SHOULDER JOINT: Chronic | ICD-10-CM

## 2018-01-29 DIAGNOSIS — Z95.5 PRESENCE OF CORONARY ANGIOPLASTY IMPLANT AND GRAFT: Chronic | ICD-10-CM

## 2018-01-29 DIAGNOSIS — Z00.8 ENCOUNTER FOR OTHER GENERAL EXAMINATION: ICD-10-CM

## 2018-01-29 DIAGNOSIS — Z95.2 PRESENCE OF PROSTHETIC HEART VALVE: Chronic | ICD-10-CM

## 2018-01-29 DIAGNOSIS — E66.9 OBESITY, UNSPECIFIED: Chronic | ICD-10-CM

## 2018-01-29 DIAGNOSIS — Z96.612 PRESENCE OF LEFT ARTIFICIAL SHOULDER JOINT: Chronic | ICD-10-CM

## 2018-01-29 DIAGNOSIS — C02.9 MALIGNANT NEOPLASM OF TONGUE, UNSPECIFIED: Chronic | ICD-10-CM

## 2018-01-29 PROCEDURE — 78815 PET IMAGE W/CT SKULL-THIGH: CPT | Mod: 26,PS

## 2018-02-01 PROBLEM — R59.0 LYMPHADENOPATHY, AXILLARY: Status: ACTIVE | Noted: 2018-02-01

## 2018-02-06 ENCOUNTER — APPOINTMENT (OUTPATIENT)
Dept: ULTRASOUND IMAGING | Facility: CLINIC | Age: 73
End: 2018-02-06
Payer: MEDICARE

## 2018-02-06 ENCOUNTER — OUTPATIENT (OUTPATIENT)
Dept: OUTPATIENT SERVICES | Facility: HOSPITAL | Age: 73
LOS: 1 days | End: 2018-02-06
Payer: MEDICARE

## 2018-02-06 ENCOUNTER — RESULT REVIEW (OUTPATIENT)
Age: 73
End: 2018-02-06

## 2018-02-06 DIAGNOSIS — Z95.5 PRESENCE OF CORONARY ANGIOPLASTY IMPLANT AND GRAFT: Chronic | ICD-10-CM

## 2018-02-06 DIAGNOSIS — Z95.2 PRESENCE OF PROSTHETIC HEART VALVE: Chronic | ICD-10-CM

## 2018-02-06 DIAGNOSIS — Z96.611 PRESENCE OF RIGHT ARTIFICIAL SHOULDER JOINT: Chronic | ICD-10-CM

## 2018-02-06 DIAGNOSIS — E66.9 OBESITY, UNSPECIFIED: Chronic | ICD-10-CM

## 2018-02-06 DIAGNOSIS — Z96.612 PRESENCE OF LEFT ARTIFICIAL SHOULDER JOINT: Chronic | ICD-10-CM

## 2018-02-06 DIAGNOSIS — R59.0 LOCALIZED ENLARGED LYMPH NODES: ICD-10-CM

## 2018-02-06 DIAGNOSIS — C02.9 MALIGNANT NEOPLASM OF TONGUE, UNSPECIFIED: Chronic | ICD-10-CM

## 2018-02-06 PROCEDURE — 38505 NEEDLE BIOPSY LYMPH NODES: CPT

## 2018-02-06 PROCEDURE — 88305 TISSUE EXAM BY PATHOLOGIST: CPT

## 2018-02-06 PROCEDURE — 88305 TISSUE EXAM BY PATHOLOGIST: CPT | Mod: 26

## 2018-02-06 PROCEDURE — 76942 ECHO GUIDE FOR BIOPSY: CPT | Mod: 26

## 2018-02-06 PROCEDURE — 76942 ECHO GUIDE FOR BIOPSY: CPT

## 2018-02-06 PROCEDURE — 88173 CYTOPATH EVAL FNA REPORT: CPT

## 2018-02-06 PROCEDURE — 88173 CYTOPATH EVAL FNA REPORT: CPT | Mod: 26

## 2018-03-29 ENCOUNTER — APPOINTMENT (OUTPATIENT)
Dept: OTOLARYNGOLOGY | Facility: CLINIC | Age: 73
End: 2018-03-29
Payer: MEDICARE

## 2018-03-29 VITALS
HEIGHT: 59 IN | WEIGHT: 167 LBS | BODY MASS INDEX: 33.67 KG/M2 | SYSTOLIC BLOOD PRESSURE: 144 MMHG | HEART RATE: 73 BPM | DIASTOLIC BLOOD PRESSURE: 75 MMHG

## 2018-03-29 PROCEDURE — 99214 OFFICE O/P EST MOD 30 MIN: CPT | Mod: 25

## 2018-03-29 PROCEDURE — 31575 DIAGNOSTIC LARYNGOSCOPY: CPT

## 2018-05-10 ENCOUNTER — APPOINTMENT (OUTPATIENT)
Dept: OTOLARYNGOLOGY | Facility: CLINIC | Age: 73
End: 2018-05-10
Payer: MEDICARE

## 2018-05-10 VITALS
HEIGHT: 59 IN | DIASTOLIC BLOOD PRESSURE: 73 MMHG | HEART RATE: 75 BPM | SYSTOLIC BLOOD PRESSURE: 124 MMHG | WEIGHT: 167 LBS | BODY MASS INDEX: 33.67 KG/M2

## 2018-05-10 VITALS
HEART RATE: 75 BPM | HEIGHT: 59 IN | BODY MASS INDEX: 33.67 KG/M2 | DIASTOLIC BLOOD PRESSURE: 73 MMHG | WEIGHT: 167 LBS | SYSTOLIC BLOOD PRESSURE: 124 MMHG

## 2018-05-10 PROCEDURE — 99214 OFFICE O/P EST MOD 30 MIN: CPT
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
## 2018-07-25 ENCOUNTER — FORM ENCOUNTER (OUTPATIENT)
Age: 73
End: 2018-07-25

## 2018-07-26 ENCOUNTER — OUTPATIENT (OUTPATIENT)
Dept: OUTPATIENT SERVICES | Facility: HOSPITAL | Age: 73
LOS: 1 days | End: 2018-07-26
Payer: MEDICARE

## 2018-07-26 ENCOUNTER — APPOINTMENT (OUTPATIENT)
Dept: RADIOLOGY | Facility: HOSPITAL | Age: 73
End: 2018-07-26

## 2018-07-26 ENCOUNTER — APPOINTMENT (OUTPATIENT)
Dept: OTOLARYNGOLOGY | Facility: CLINIC | Age: 73
End: 2018-07-26
Payer: MEDICARE

## 2018-07-26 VITALS
DIASTOLIC BLOOD PRESSURE: 77 MMHG | HEIGHT: 59 IN | WEIGHT: 167 LBS | RESPIRATION RATE: 78 BRPM | SYSTOLIC BLOOD PRESSURE: 156 MMHG | BODY MASS INDEX: 33.67 KG/M2

## 2018-07-26 DIAGNOSIS — C02.9 MALIGNANT NEOPLASM OF TONGUE, UNSPECIFIED: Chronic | ICD-10-CM

## 2018-07-26 DIAGNOSIS — Z95.2 PRESENCE OF PROSTHETIC HEART VALVE: Chronic | ICD-10-CM

## 2018-07-26 DIAGNOSIS — Z96.612 PRESENCE OF LEFT ARTIFICIAL SHOULDER JOINT: Chronic | ICD-10-CM

## 2018-07-26 DIAGNOSIS — R05 COUGH: ICD-10-CM

## 2018-07-26 DIAGNOSIS — E66.9 OBESITY, UNSPECIFIED: Chronic | ICD-10-CM

## 2018-07-26 DIAGNOSIS — Z96.611 PRESENCE OF RIGHT ARTIFICIAL SHOULDER JOINT: Chronic | ICD-10-CM

## 2018-07-26 DIAGNOSIS — Z95.5 PRESENCE OF CORONARY ANGIOPLASTY IMPLANT AND GRAFT: Chronic | ICD-10-CM

## 2018-07-26 PROBLEM — G47.30 SLEEP APNEA, UNSPECIFIED: Chronic | Status: ACTIVE | Noted: 2017-05-15

## 2018-07-26 PROBLEM — J44.9 CHRONIC OBSTRUCTIVE PULMONARY DISEASE, UNSPECIFIED: Chronic | Status: ACTIVE | Noted: 2017-05-15

## 2018-07-26 PROCEDURE — 71046 X-RAY EXAM CHEST 2 VIEWS: CPT | Mod: 26

## 2018-07-26 PROCEDURE — 99214 OFFICE O/P EST MOD 30 MIN: CPT | Mod: 25

## 2018-07-26 PROCEDURE — 31575 DIAGNOSTIC LARYNGOSCOPY: CPT

## 2018-10-11 ENCOUNTER — APPOINTMENT (OUTPATIENT)
Dept: ENDOCRINOLOGY | Facility: CLINIC | Age: 73
End: 2018-10-11

## 2018-11-01 ENCOUNTER — APPOINTMENT (OUTPATIENT)
Dept: OTOLARYNGOLOGY | Facility: CLINIC | Age: 73
End: 2018-11-01
Payer: MEDICARE

## 2018-11-01 VITALS
HEART RATE: 56 BPM | BODY MASS INDEX: 34.68 KG/M2 | HEIGHT: 59 IN | DIASTOLIC BLOOD PRESSURE: 90 MMHG | SYSTOLIC BLOOD PRESSURE: 174 MMHG | WEIGHT: 172 LBS

## 2018-11-01 PROCEDURE — 99214 OFFICE O/P EST MOD 30 MIN: CPT | Mod: 25

## 2018-11-01 PROCEDURE — 31575 DIAGNOSTIC LARYNGOSCOPY: CPT

## 2019-09-29 NOTE — SPEAKING VALVE EVALUATION - SUBJECTIVE COMPLAINTS DURING VALVE TRIAL
pt tolerated ~8 minutes of PMV placement at which time she reported shortness of breath with increased work of breathing noted. SaO2 noted to remain between % throughout today's trial.
Patient/Caregiver provided printed discharge information.

## 2022-03-30 NOTE — ED ADULT NURSE NOTE - PMH
Anxiety    Aortic stenosis    Diabetes    Hypertension    Hypothyroid    Parkinson disease    Retinal defect, left Cyclophosphamide Pregnancy And Lactation Text: This medication is Pregnancy Category D and it isn't considered safe during pregnancy. This medication is excreted in breast milk.

## 2022-12-13 NOTE — PROGRESS NOTE ADULT - I WAS PHYSICALLY PRESENT FOR THE KEY PORTIONS OF THE EVALUATION AND MANAGEMENT (E/M) SERVICE PROVIDED.  I AGREE WITH THE ABOVE HISTORY, PHYSICAL, AND PLAN WHICH I HAVE REVIEWED AND EDITED WHERE APPROPRIATE
This visit is being performed virtually via Telephonic Visit. Consent to treat includes permission to submit charges to the patient's insurance. It was shared that without being seen and evaluated in person, there is a risk that the information and/or assessment may be incomplete or inaccurate. This telephonic visit may be discontinued by patient or clinician, if it is felt that the telephonic connections are not adequate for her situation.   Clinical Location: [facility]  [facility] and is physically present in   the [facility] at the time of this visit.     Desiree is doing very well.  This morning she was able to ambulate in the treadmill for a total of 1-1/2 mi in 45 minutes.  This was while using supplemental oxygen at 2 liters/minute nasal cannula, she did not have to interrupt her exercise at all.  She has tried climbing stairs and she was able to do that once or twice in the past 3 days without experiencing hyperventilation as she did before.  There has been only 1 instance of blood streak sputum but other than that she is feeling much better as compared to before the procedure with revision and exchange of valves.    Her chest x-ray shows a discrete right upper lobe nodule which is new and I believe this is nothing but an area of inflammation after the procedure.  The right hemidiaphragm has elevated very well.        IMP:  1. Status post bronchoscopic lung volume reduction  2. Very severe COPD  3. Chronic respiratory failure with hypoxemia.    Continue with daily exercise, supplemental oxygen, long-acting bronchodilators, Daliresp.    She will come back and visit with me at the end of January with an updated CT scan of the chest, complete PFTs and a 6 minutes walk test.    Telephone visit for 12 minutes.   Statement Selected

## 2022-12-13 NOTE — ASU PATIENT PROFILE, ADULT - NS PREOP UNDERSTANDS INFO
EMERGENCY DEPARTMENT HISTORY AND PHYSICAL EXAM      Date: 12/13/2022  Patient Name: Amelia Newton Lee  Patient Age and Sex: 46 y.o. female     History of Presenting Illness     Chief Complaint   Patient presents with    Abdominal Pain     Patient stated that she has had intermittent abdominal pain since Monday. She stated that the pain has been really intense since around 2330. Patient has been dry-heaving but denies any vomiting. Denies any diarrhea, constipation or indigestion. Patient has taken Zofran for the nausea around 0430 with no relief. Patient also took Hycosamine at 0430 with no relief. Denies any abdominal history. Denies any urinary changes. History Provided By: Patient    HPI: Derek Lema is a 46year old history nephrolithiasis presenting with upper abdominal pain. She reports constant moderate to severe upper abdominal pain since last night. Pain is burning in nature, feels like being 'eaten from the inside' since then. She has had associated nausea, vomiting, dry heaving. No blood in vomitus or coffee ground particulate. No fever. No urinary symptoms. No recent NSAID or steroid use. She took pepto bismol without relief to her symptoms last night. No abdominal surgical history. Is seen by a GI physician in [place], has had EGD in the past for screening with family of esophageal cancer. There are no other complaints, changes, or physical findings at this time. PCP: Markham Holter, MD    No current facility-administered medications on file prior to encounter. Current Outpatient Medications on File Prior to Encounter   Medication Sig Dispense Refill    HYDROcodone-acetaminophen (NORCO) 5-325 mg per tablet Take 1 Tab by mouth every six (6) hours as needed for Pain. Max Daily Amount: 4 Tabs. 12 Tab 0    ketorolac (TORADOL) 10 mg tablet Take 1 Tab by mouth every six (6) hours as needed for Pain.  20 Tab 0       Past History     Past Medical History:  Past Medical History:   Diagnosis Date    Kidney calculi        Past Surgical History:  Past Surgical History:   Procedure Laterality Date    HX UROLOGICAL       Family History:  No family history on file. Social History:  Social History     Tobacco Use    Smoking status: Every Day    Smokeless tobacco: Never   Substance Use Topics    Alcohol use: Yes    Drug use: No     Allergies: Allergies   Allergen Reactions    Pcn [Penicillins] Rash     Review of Systems   Review of Systems   Constitutional:  Negative for chills and fever. HENT:  Negative for congestion and rhinorrhea. Respiratory:  Negative for shortness of breath. Cardiovascular:  Negative for chest pain. Gastrointestinal:  Positive for abdominal pain, nausea and vomiting. Genitourinary:  Negative for dysuria and frequency. Musculoskeletal:  Negative for myalgias. All other systems reviewed and are negative. Physical Exam   Physical Exam  Vitals and nursing note reviewed. Constitutional:       General: She is not in acute distress. Appearance: Normal appearance. She is well-developed. She is not ill-appearing. HENT:      Head: Normocephalic. Mouth/Throat:      Mouth: Mucous membranes are moist.   Eyes:      Conjunctiva/sclera: Conjunctivae normal.   Cardiovascular:      Rate and Rhythm: Normal rate and regular rhythm. Pulses: Normal pulses. Pulmonary:      Effort: Pulmonary effort is normal.      Breath sounds: Normal breath sounds. Abdominal:      General: Abdomen is flat. Palpations: Abdomen is soft. Tenderness: There is abdominal tenderness in the right upper quadrant and epigastric area. Positive signs include Gonsales's sign. Musculoskeletal:         General: No deformity. Skin:     General: Skin is warm and dry. Neurological:      Mental Status: She is alert and oriented to person, place, and time. Mental status is at baseline. Psychiatric:         Behavior: Behavior normal.         Thought Content:  Thought content normal. Diagnostic Study Results   Labs  Recent Results (from the past 12 hour(s))   CBC WITH AUTOMATED DIFF    Collection Time: 12/13/22  6:07 AM   Result Value Ref Range    WBC 14.3 (H) 3.6 - 11.0 K/uL    RBC 5.34 (H) 3.80 - 5.20 M/uL    HGB 15.5 11.5 - 16.0 g/dL    HCT 46.8 35.0 - 47.0 %    MCV 87.6 80.0 - 99.0 FL    MCH 29.0 26.0 - 34.0 PG    MCHC 33.1 30.0 - 36.5 g/dL    RDW 12.9 11.5 - 14.5 %    PLATELET 998 186 - 733 K/uL    MPV 11.3 8.9 - 12.9 FL    NRBC 0.0 0  WBC    ABSOLUTE NRBC 0.00 0.00 - 0.01 K/uL    NEUTROPHILS 81 (H) 32 - 75 %    LYMPHOCYTES 13 12 - 49 %    MONOCYTES 4 (L) 5 - 13 %    EOSINOPHILS 1 0 - 7 %    BASOPHILS 0 0 - 1 %    IMMATURE GRANULOCYTES 1 (H) 0.0 - 0.5 %    ABS. NEUTROPHILS 11.7 (H) 1.8 - 8.0 K/UL    ABS. LYMPHOCYTES 1.8 0.8 - 3.5 K/UL    ABS. MONOCYTES 0.5 0.0 - 1.0 K/UL    ABS. EOSINOPHILS 0.2 0.0 - 0.4 K/UL    ABS. BASOPHILS 0.0 0.0 - 0.1 K/UL    ABS. IMM. GRANS. 0.1 (H) 0.00 - 0.04 K/UL    DF AUTOMATED     METABOLIC PANEL, COMPREHENSIVE    Collection Time: 12/13/22  6:07 AM   Result Value Ref Range    Sodium 136 136 - 145 mmol/L    Potassium 4.6 3.5 - 5.1 mmol/L    Chloride 106 97 - 108 mmol/L    CO2 25 21 - 32 mmol/L    Anion gap 5 5 - 15 mmol/L    Glucose 117 (H) 65 - 100 mg/dL    BUN 18 6 - 20 MG/DL    Creatinine 0.86 0.55 - 1.02 MG/DL    BUN/Creatinine ratio 21 (H) 12 - 20      eGFR >60 >60 ml/min/1.73m2    Calcium 9.3 8.5 - 10.1 MG/DL    Bilirubin, total 0.7 0.2 - 1.0 MG/DL    ALT (SGPT) 28 12 - 78 U/L    AST (SGOT) 10 (L) 15 - 37 U/L    Alk.  phosphatase 81 45 - 117 U/L    Protein, total 7.8 6.4 - 8.2 g/dL    Albumin 4.0 3.5 - 5.0 g/dL    Globulin 3.8 2.0 - 4.0 g/dL    A-G Ratio 1.1 1.1 - 2.2     LIPASE    Collection Time: 12/13/22  6:07 AM   Result Value Ref Range    Lipase 134 73 - 393 U/L   URINALYSIS W/ REFLEX CULTURE    Collection Time: 12/13/22  6:07 AM    Specimen: Urine   Result Value Ref Range    Color YELLOW/STRAW      Appearance CLEAR CLEAR      Specific gravity 1.022      pH (UA) 6.0 5.0 - 8.0      Protein Negative NEG mg/dL    Glucose Negative NEG mg/dL    Ketone TRACE (A) NEG mg/dL    Bilirubin Negative NEG      Blood TRACE (A) NEG      Urobilinogen 1.0 0.2 - 1.0 EU/dL    Nitrites Negative NEG      Leukocyte Esterase SMALL (A) NEG      WBC 0-4 0 - 4 /hpf    RBC 0-5 0 - 5 /hpf    Epithelial cells MANY (A) FEW /lpf    Bacteria 2+ (A) NEG /hpf    UA:UC IF INDICATED CULTURE NOT INDICATED BY UA RESULT CNI     HCG QL SERUM    Collection Time: 12/13/22  6:07 AM   Result Value Ref Range    HCG, Ql. Negative NEG     EKG, 12 LEAD, INITIAL    Collection Time: 12/13/22 10:02 AM   Result Value Ref Range    Ventricular Rate 75 BPM    Atrial Rate 75 BPM    P-R Interval 120 ms    QRS Duration 90 ms    Q-T Interval 386 ms    QTC Calculation (Bezet) 431 ms    Calculated P Axis 68 degrees    Calculated R Axis 55 degrees    Calculated T Axis 50 degrees    Diagnosis       Normal sinus rhythm  Normal ECG  When compared with ECG of 07-FEB-2016 06:37,  No significant change was found       Radiologic Studies  US ABD LTD   Final Result      Acute calculus cholecystitis with underlying adenomyomatosis           CT Results  (Last 48 hours)      None          CXR Results  (Last 48 hours)      None            Medical Decision Making   I am the first provider for this patient. I reviewed the vital signs, available nursing notes, past medical history, past surgical history, family history and social history. Vital Signs-Reviewed the patient's vital signs. Patient Vitals for the past 12 hrs:   Temp Pulse Resp BP SpO2   12/13/22 0549 97.5 °F (36.4 °C) 76 20 132/87 97 %     Records Reviewed: Nursing Notes and Old Medical Records    Provider Notes (Medical Decision Making):   Ddx gastritis, cholecystitis, symptomatic cholelithiasis    Will obtain RUQ ultrasound, labwork, treat possible gastritis with pepcid and maalox. UA, zofran, IV fluids. ED Course:   Initial assessment performed. The patients presenting problems have been discussed, and they are in agreement with the care plan formulated and outlined with them. I have encouraged them to ask questions as they arise throughout their visit. ED Course as of 12/13/22 1324   Tue Dec 13, 2022   0734 CBC shows a mildly elevated white count of 14, CMP shows normal LFTs normal renal function and lipase is normal UA is contaminated with no evidence of infection [WB]   0823 Ultrasound shows acute calculus cholecystitis, wall thickening to 5 mm with sonographic Gonsales sign [WB]   0828 We will move patient to room, Dr. Hurtado Gallina has been consulted reports allergy to penicillin, diarrhea as an infant. [WB]   9712 Evaluated by Dr. Zaina Nesbitt, will take to the OR today [WB]   0916 Levaquin and Flagyl ordered by admitting surgeon [WB]      ED Course User Index  [WB] Rani Duarte MD     Disposition:  Admission Note:  Patient is being admitted to the hospital by Dr. Zaina Nesbitt, Service: General Surgery. The results of their tests and reasons for their admission have been discussed with them and available family. They convey agreement and understanding for the need to be admitted and for their admission diagnosis. Diagnosis     Clinical Impression:   1. Acute cholecystitis due to biliary calculus    2. Acute calculous cholecystitis [K80.00 (ICD-10-CM)]        Attestations:    Isauro Reyes M.D. Please note that this dictation was completed with [employer], the computer voice recognition software. Quite often unanticipated grammatical, syntax, homophones, and other interpretive errors are inadvertently transcribed by the computer software. Please disregard these errors. Please excuse any errors that have escaped final proofreading. Thank you. yes

## 2023-06-23 NOTE — SPEAKING VALVE EVALUATION - RECOMMENDED SPEAKING VALVE GUIDELINES
DC Plan: [facility] (accepted) During waking hours only/Level of supervision

## 2023-06-28 NOTE — ASU PREOP CHECKLIST - ASSESSMENT, HISTORY & PHYSICAL COMPLETED AND ON MEDICAL RECORD
done

## 2024-06-18 NOTE — DISCHARGE NOTE ADULT - MEDICATION SUMMARY - MEDICATIONS TO TAKE
Medication:    budesonide-formoterol (Symbicort) 160-4.5 MCG/ACT inhaler    passed protocol.   Last office visit date: 02/12/2024  Next appointment scheduled?: No;       Number of refills given: 3 x 3      I will START or STAY ON the medications listed below when I get home from the hospital:    meloxicam 15 mg oral tablet  -- 1 tab(s) by mouth once a day, As Needed  -- Indication: For Arthritis    acetaminophen-oxyCODONE 325 mg-5 mg oral tablet  --  by mouth , As Needed  -- Indication: For Pain    losartan 25 mg oral tablet  -- 1 tab(s) by mouth once a day  -- Indication: For Hypertension    gabapentin 100 mg oral capsule  -- 2 cap(s) by mouth once a day, As Needed  -- Indication: For Pain    sertraline 100 mg oral tablet  -- 1 tab(s) by mouth once a day  -- Indication: For Depression    Levemir 100 units/mL subcutaneous solution  -- 60 unit(s) subcutaneous once a day (at bedtime)  -- Indication: For Diabetes    metFORMIN 500 mg oral tablet  -- 1 tab(s) by mouth every 8 hours  -- Indication: For Diabetes    carbidopa-levodopa 25 mg-100 mg oral tablet  -- 1 tab(s) by mouth 4 times a day  -- Indication: For Parkinson disease    selegiline 5 mg oral capsule  -- 1 cap(s) by mouth 2 times a day  -- Indication: For Depression    Neupro 4 mg/24 hr transdermal film, extended release  -- 1 patch by transdermal patch once a day  -- Indication: For Parkinson disease    Plavix 75 mg oral tablet  -- 1 tab(s) by mouth once a day  -- Indication: For Heart    ALPRAZolam 1 mg oral tablet  -- 1 tab(s) by mouth 4 times a day, As Needed -Anti-anxiety -anxiety  -- Indication: For Anxiety    metoprolol succinate 50 mg oral tablet, extended release  -- 1 tab(s) by mouth once a day  -- Indication: For Hypertension    albuterol 2.5 mg/3 mL (0.083%) inhalation solution  -- 2.5 milligram(s) inhaled every 6 hours, As Needed  -- Indication: For lungs    albuterol 90 mcg/inh inhalation aerosol  --  inhaled , As Needed  -- Indication: For lungs    Lasix 40 mg oral tablet  -- 1 tab(s) by mouth once a day  -- Indication: For water pill    potassium chloride 10 mEq oral capsule, extended release  -- 1 cap(s) by mouth 2 times a day  -- Indication: For vitamin    magnesium oxide 250 mg oral tablet  -- 1 tab(s) by mouth once a day  -- Indication: For vitamin    ergocalciferol 50,000 intl units (1.25 mg) oral capsule  -- 1 cap(s) by mouth 3 times a week (MWF)  -- Indication: For bones